# Patient Record
Sex: MALE | Race: WHITE | NOT HISPANIC OR LATINO | Employment: OTHER | URBAN - METROPOLITAN AREA
[De-identification: names, ages, dates, MRNs, and addresses within clinical notes are randomized per-mention and may not be internally consistent; named-entity substitution may affect disease eponyms.]

---

## 2017-01-06 LAB — INR PPP: 3

## 2017-01-10 ENCOUNTER — TELEPHONE (OUTPATIENT)
Dept: CARDIOLOGY | Facility: CLINIC | Age: 72
End: 2017-01-10

## 2017-01-10 NOTE — TELEPHONE ENCOUNTER
----- Message from Umm Aguillon PharmD sent at 1/10/2017  3:56 PM CST -----  Meche,     Mr. Pitts went to [facility] in [place], [place] for PT/INR on 1/6/17. I have called for the results but they will not release the results to me without a clinic number or clinic ID. Do you have information on this number or can you find out his results?    Thanks,  Umm

## 2017-01-11 ENCOUNTER — ANTI-COAG VISIT (OUTPATIENT)
Dept: CARDIOLOGY | Facility: CLINIC | Age: 72
End: 2017-01-11

## 2017-01-11 DIAGNOSIS — Z79.01 LONG TERM (CURRENT) USE OF ANTICOAGULANTS: Primary | ICD-10-CM

## 2017-01-11 RX ORDER — WARFARIN 2.5 MG/1
TABLET ORAL
Qty: 72 TABLET | Refills: 0 | Status: SHIPPED | OUTPATIENT
Start: 2017-01-11 | End: 2017-05-19 | Stop reason: SDUPTHER

## 2017-01-11 NOTE — PROGRESS NOTES
Faxed result taken from __[facility]_______. PT/INR __31.0/3.0_____ Date drawn___1/6_____ Patient's wife reports cold symptoms and otc allergy medications but no other changes.  Continue dose.

## 2017-02-09 ENCOUNTER — OFFICE VISIT (OUTPATIENT)
Dept: CARDIOLOGY | Facility: CLINIC | Age: 72
End: 2017-02-09
Payer: MEDICARE

## 2017-02-09 ENCOUNTER — ANTI-COAG VISIT (OUTPATIENT)
Dept: CARDIOLOGY | Facility: CLINIC | Age: 72
End: 2017-02-09
Payer: MEDICARE

## 2017-02-09 VITALS
BODY MASS INDEX: 28.93 KG/M2 | HEIGHT: 69 IN | SYSTOLIC BLOOD PRESSURE: 120 MMHG | DIASTOLIC BLOOD PRESSURE: 84 MMHG | BODY MASS INDEX: 30.1 KG/M2 | HEIGHT: 69 IN | WEIGHT: 203.25 LBS | SYSTOLIC BLOOD PRESSURE: 130 MMHG | HEART RATE: 68 BPM | DIASTOLIC BLOOD PRESSURE: 72 MMHG | HEART RATE: 59 BPM | WEIGHT: 195.31 LBS

## 2017-02-09 DIAGNOSIS — I25.2 OLD MI (MYOCARDIAL INFARCTION): ICD-10-CM

## 2017-02-09 DIAGNOSIS — I10 ESSENTIAL HYPERTENSION: ICD-10-CM

## 2017-02-09 DIAGNOSIS — Z79.01 LONG TERM (CURRENT) USE OF ANTICOAGULANTS: Primary | ICD-10-CM

## 2017-02-09 DIAGNOSIS — I48.0 PAROXYSMAL ATRIAL FIBRILLATION: ICD-10-CM

## 2017-02-09 DIAGNOSIS — I50.23 ACUTE ON CHRONIC SYSTOLIC CONGESTIVE HEART FAILURE: Primary | ICD-10-CM

## 2017-02-09 DIAGNOSIS — Z95.1 S/P CABG (CORONARY ARTERY BYPASS GRAFT): ICD-10-CM

## 2017-02-09 DIAGNOSIS — I25.10 CORONARY ARTERY DISEASE INVOLVING NATIVE CORONARY ARTERY OF NATIVE HEART WITHOUT ANGINA PECTORIS: Chronic | ICD-10-CM

## 2017-02-09 DIAGNOSIS — I48.3 TYPICAL ATRIAL FLUTTER: Primary | ICD-10-CM

## 2017-02-09 DIAGNOSIS — Z98.61 S/P PTCA (PERCUTANEOUS TRANSLUMINAL CORONARY ANGIOPLASTY): ICD-10-CM

## 2017-02-09 DIAGNOSIS — I48.3 TYPICAL ATRIAL FLUTTER: ICD-10-CM

## 2017-02-09 DIAGNOSIS — E78.5 HYPERLIPIDEMIA, UNSPECIFIED HYPERLIPIDEMIA TYPE: Chronic | ICD-10-CM

## 2017-02-09 LAB — INR PPP: 1.7 (ref 2.5–3)

## 2017-02-09 PROCEDURE — 99999 PR PBB SHADOW E&M-EST. PATIENT-LVL I: CPT | Mod: PBBFAC,,,

## 2017-02-09 PROCEDURE — 99213 OFFICE O/P EST LOW 20 MIN: CPT | Mod: PBBFAC,27 | Performed by: PHYSICIAN ASSISTANT

## 2017-02-09 PROCEDURE — 99214 OFFICE O/P EST MOD 30 MIN: CPT | Mod: S$PBB,,, | Performed by: PHYSICIAN ASSISTANT

## 2017-02-09 PROCEDURE — 99999 PR PBB SHADOW E&M-EST. PATIENT-LVL III: CPT | Mod: PBBFAC,,, | Performed by: PHYSICIAN ASSISTANT

## 2017-02-09 PROCEDURE — 99999 PR PBB SHADOW E&M-EST. PATIENT-LVL II: CPT | Mod: PBBFAC,,, | Performed by: INTERNAL MEDICINE

## 2017-02-09 PROCEDURE — 99214 OFFICE O/P EST MOD 30 MIN: CPT | Mod: S$PBB,,, | Performed by: INTERNAL MEDICINE

## 2017-02-09 RX ORDER — ACETAMINOPHEN 500 MG
185 TABLET ORAL
COMMUNITY

## 2017-02-09 NOTE — MR AVS SNAPSHOT
[facility]  [address] [facility] [address]  Phone: [phone]  Fax: [phone]                  Jethro Pitts   2017 1:30 PM   Office Visit    Description:  Male : 1945   Provider:  IDRIS Nash   Department:  [facility]           Reason for Visit     Congestive Heart Failure           Diagnoses this Visit        Comments    Old MI (myocardial infarction)    -  Primary     S/P CABG (coronary artery bypass graft)         Coronary artery disease involving native coronary artery of native heart without angina pectoris                To Do List           Future Appointments        Provider Department Dept Phone    2017 1:00 PM [facility] [phone]    2017 1:30 PM IDRIS Nash [facility] [phone]    3/2/2017 11:00 AM [facility] [phone]      Goals (5 Years of Data)     None      [facility]     [facility] Care Line -  Assistance  Registered nurses in the [facility] On Call Center provide clinical advisement, health education, appointment booking, and other advisory services.  Call for this free service at [phone].             Medications           Message regarding Medications     Verify the changes and/or additions to your medication regime listed below are the same as discussed with your clinician today.  If any of these changes or additions are incorrect, please notify your healthcare provider.             Verify that the below list of medications is an accurate representation of the medications you are currently taking.  If none reported, the list may be blank. If incorrect, please contact your healthcare provider. Carry this list with you in case of emergency.           Current Medications     atorvastatin (LIPITOR) 20 MG tablet Take 20 mg by mouth once daily.    carvedilol (COREG) 6.25 MG tablet TAKE 1 TABLET IN THE  "MORNING  AND TAKE 2 TABLETS AT NIGHT    escitalopram oxalate (LEXAPRO) 10 MG tablet Take 10 mg by mouth once daily.     fish oil-omega-3 fatty acids 300-1,000 mg capsule Take 1,290 g by mouth once daily.     vitamin D 1000 units Tab Take 185 mg by mouth.    warfarin (COUMADIN) 2.5 MG tablet Take 1/2 tablet Monday, Wednesday and 1 tablet all other days as directed by the coumadin clinic.           Clinical Reference Information           Your Vitals Were     BP Pulse Height Weight BMI    120/72 59 5' 9" (1.753 m) 88.6 kg (195 lb 5.2 oz) 28.84 kg/m2      Blood Pressure          Most Recent Value    BP  120/72      Allergies as of 2/9/2017     Pcn [Penicillins]      Immunizations Administered on Date of Encounter - 2/9/2017     None      Orders Placed During Today's Visit     Future Labs/Procedures Expected by Expires    Comprehensive metabolic panel  8/11/2017 (Approximate) 2/9/2018    Lipid panel  8/11/2017 (Approximate) 2/9/2018      Language Assistance Services     ATTENTION: Language assistance services are available, free of charge. Please call [phone].      ATENCIÓN: Si silvestre schreiber, tiene a duran disposición servicios gratuitos de asistencia lingüística. Llame al [phone].     SAFIA Ý: N?u b?n nói Ti?ng Vi?t, có các d?ch v? h? tr? ngôn ng? mi?n phí dành cho b?n. G?i s? [phone].         [facility] complies with applicable Federal civil rights laws and does not discriminate on the basis of race, color, national origin, age, disability, or sex.        "

## 2017-02-09 NOTE — PROGRESS NOTES
Subjective:    Patient ID:  Jethro Pitts is a 71 y.o. male who presents for follow-up of Atrial Fibrillation      HPI Comments: 71 yoM atrial fibrillation, atrial flutter on Coumadin,  systolic CHF, cardiomyopathy, CAD s/p CABG, old MI, HTN, and hyperlipidemia. He presents to clinic today for hypotension. Home health Nurse took blood pressure and it was 70/50. It was reported he was feeling some generalize weakness, however he states he always fills some generalize weakness. Denies chest pain, increase shortness of breath, palpitations, syncope or near syncope. Vital signs are stable today in clinic. He has had several hospitalizations over the last few months with A Fib with RVR and Systolic Heart Failure. Patient was last admitted to [facility] in 3/2016, at that time he was loaded on Dig and started on Amiodarone. He was last seen in clinic on 3/29/2016 and was doing ok. 2 D Echo on 12/30/2016 showed severely depressed left ventricular systolic function (EF 15-20%). Was schedule an ablation on 2/23/2016, however was cancel because DIONNE showed aq clot on his RADHA and RAA.     7/16: He underwent successful PVI and AFL ablation 5/24/16 without complication. He remains on digoxin, amiodarone 200 mg bid, coreg and warfarin. His pre-procedure DIONNE showed EF 60% in NSR. He feels markedly improved. Less dyspnea, fatigue & edema.     10/16: Amiodarone and digoxin stopped July 2016. He remains on coreg and warfarin. No active complaints. Feels well, lost 40 pounds this year. Remains in SR by symptoms as well as by ECG. Echo shows EF 40-45%, improved from 15% in AF/AFL.     Interval history: No symptomatic recurrence. Remains on warfarin due to history of RADAH thrombus.    Echo 10/16:  CONCLUSIONS     1 - Mild left atrial enlargement.     2 - Eccentric hypertrophy.     3 - Mildly to moderately depressed left ventricular systolic function (EF 40-45%).     4 - Normal left ventricular diastolic function.     5 - Normal right  ventricular systolic function .     6 - Trivial to mild mitral regurgitation.     7 - The estimated PA systolic pressure is greater than 29 mmHg.     8 - Trivial to mild tricuspid regurgitation.     Past Medical History:    Acute coronary syndrome                                       Anticoagulant long-term use                                   Atrial fibrillation                                           Atrial flutter                                                CHF (congestive heart failure)                                Coronary artery disease                                       Depression                                                    Encounter for blood transfusion                               Hyperlipidemia                                                Hypertension                                                  Old MI (myocardial infarction)                                S/P CABG (coronary artery bypass graft)         12/5/2013     S/P PTCA (percutaneous transluminal coronary a* 12/5/2013     Sleep apnea                                                   Past Surgical History:    CORONARY ANGIOPLASTY                                           CORONARY ARTERY BYPASS GRAFT                                   KNEE SURGERY                                                   EYE SURGERY                                                    Social History    Marital status:              Spouse name:                       Years of education:                 Number of children:               Occupational History    None on file    Social History Main Topics    Smoking status: Former Smoker                                                                Packs/day: 0.50      Years: 15.00          Quit date: 12/5/1983       Smokeless status: Not on file                       Alcohol use: No              Drug use: No              Sexual activity: Not on file          Other Topics            Concern    None on  file    Social History Narrative    None on file    Review of patient's family history indicates:    Coronary artery disease        Mother                    Coronary artery disease        Sister                    Coronary artery disease        Brother                     Atrial Fibrillation   Symptoms are negative for chest pain, palpitations and syncope. Past medical history includes atrial fibrillation.       Review of Systems   Constitution: Negative for malaise/fatigue.   HENT: Negative.    Eyes: Negative.    Cardiovascular: Negative for chest pain, dyspnea on exertion, irregular heartbeat, near-syncope, orthopnea, palpitations and syncope.   Respiratory: Negative.    Endocrine: Negative.    Skin: Negative.    Musculoskeletal: Negative.    Gastrointestinal: Negative.    Genitourinary: Negative.    Neurological: Negative.    Psychiatric/Behavioral: Negative.    Allergic/Immunologic: Negative.         Objective:    Physical Exam   Constitutional: He is oriented to person, place, and time. He appears well-developed and well-nourished. No distress.   HENT:   Head: Normocephalic and atraumatic.   Eyes: Conjunctivae and EOM are normal. Pupils are equal, round, and reactive to light. Right eye exhibits no discharge. Left eye exhibits no discharge.   Neck: Neck supple. No JVD present. No tracheal deviation present. No thyromegaly present.   Cardiovascular: Normal rate, regular rhythm, S1 normal, S2 normal, normal heart sounds and intact distal pulses.  PMI is not displaced.  Exam reveals no gallop, no S3, no S4 and no friction rub.    No murmur heard.  Pulses:       Radial pulses are 2+ on the right side, and 2+ on the left side.   Pulmonary/Chest: Effort normal and breath sounds normal. No respiratory distress. He has no wheezes. He has no rales.   Abdominal: Soft. He exhibits no distension. There is no tenderness. There is no rebound.   Musculoskeletal: He exhibits no edema.   Neurological: He is alert and oriented  to person, place, and time. No cranial nerve deficit.   Skin: Skin is warm and dry. He is not diaphoretic. No erythema.   Psychiatric: He has a normal mood and affect. His behavior is normal. Judgment and thought content normal.   Nursing note and vitals reviewed.        Assessment:       1. Typical atrial flutter    2. Paroxysmal atrial fibrillation    3. Essential hypertension    4. S/P CABG (coronary artery bypass graft)         Plan:         71 yoM AF, AFL, arrhythmia induced cardiomyopathy s/p RFA here for long term visit. He has no documented or symptomatic recurrence of AF/AFL or heart failure. He has been off AAD therapy for 6 months. Would continue coreg and warfarin. RTC 1y or as needed.

## 2017-02-09 NOTE — PROGRESS NOTES
Subjective:    Patient ID:  Jethro Pitts is a 71 y.o. male who presents for follow-up of Congestive Heart Failure      HPI  Mr. Pitts is a 71 year old male patient with a PMHx of atrial fibrillation, atrial flutter (previoulsy on Eliquis, now on Coumadin) systolic CHF, cardiomyopathy, CAD s/p CABG, old MI, HTN, and hyperlipidemia who presents today for follow-up. He underwent successful PVI and AFL ablation 5/24/16 by Dr. Villafuerte without any complications. He returns today and states he is feeling great. No complaints. Denies any chest pain, SOB, or other anginal signs or symptoms. CHF wise, patient appears stable. No SOB, PND, orthopnea, abdominal bloating, lower extremity edema or weight gain. Patient also denies any lightheadedness, dizziness, palpitations, near syncope, or syncope. He reports compliance with his medications. Remains on Coumadin for CVA prophylaxis. No bleeding issues. No s/s of TIA/CVA. 2D echo from 10/16 reviewed and showed EF of 40%, improved from 15%. Stress 1/16 negative for ischemia.       Review of Systems   Constitution: Negative for chills, decreased appetite, fever, weakness and malaise/fatigue.   HENT: Negative for congestion, headaches, hoarse voice and sore throat.    Eyes: Negative for blurred vision and discharge.   Cardiovascular: Negative for chest pain, claudication, cyanosis, dyspnea on exertion, irregular heartbeat, leg swelling, near-syncope, orthopnea, palpitations and paroxysmal nocturnal dyspnea.   Respiratory: Negative for cough, hemoptysis, shortness of breath, snoring, sputum production and wheezing.    Endocrine: Negative for cold intolerance and heat intolerance.   Hematologic/Lymphatic: Negative for bleeding problem. Does not bruise/bleed easily.   Skin: Negative for rash.   Musculoskeletal: Negative for arthritis, back pain, joint pain, joint swelling, muscle cramps, muscle weakness and myalgias.   Gastrointestinal: Negative for abdominal pain, constipation,  "diarrhea, heartburn, melena and nausea.   Genitourinary: Negative for hematuria.   Neurological: Negative for dizziness, focal weakness, light-headedness, loss of balance, numbness, paresthesias and seizures.   Psychiatric/Behavioral: Negative for memory loss. The patient does not have insomnia.    Allergic/Immunologic: Negative for hives.         Visit Vitals    /72    Pulse (!) 59    Ht 5' 9" (1.753 m)    Wt 88.6 kg (195 lb 5.2 oz)    BMI 28.84 kg/m2       Objective:    Physical Exam   Constitutional: He is oriented to person, place, and time. He appears well-developed and well-nourished. No distress.   HENT:   Head: Normocephalic and atraumatic.   Eyes: Pupils are equal, round, and reactive to light. Right eye exhibits no discharge. Left eye exhibits no discharge.   Neck: Neck supple. No JVD present. No tracheal deviation present. No thyromegaly present.   Cardiovascular: Regular rhythm, S1 normal, S2 normal and normal heart sounds.  Bradycardia present.  PMI is not displaced.  Exam reveals no gallop, no S3, no S4 and no friction rub.    No murmur heard.  Pulses:       Radial pulses are 2+ on the right side, and 2+ on the left side.   Pulmonary/Chest: Effort normal and breath sounds normal. No respiratory distress. He has no wheezes. He has no rales.   Abdominal: Soft. He exhibits no distension. There is no tenderness. There is no rebound.   Musculoskeletal: He exhibits no edema.   Neurological: He is alert and oriented to person, place, and time.   Skin: Skin is warm and dry. He is not diaphoretic. No erythema.   Psychiatric: He has a normal mood and affect. His behavior is normal. Thought content normal.   Nursing note and vitals reviewed.      2D Echo CONCLUSIONS     1 - Mild left atrial enlargement.     2 - Eccentric hypertrophy.     3 - Mildly to moderately depressed left ventricular systolic function (EF 40-45%).     4 - Normal left ventricular diastolic function.     5 - Normal right ventricular " systolic function .     6 - Trivial to mild mitral regurgitation.     7 - The estimated PA systolic pressure is greater than 29 mmHg.     8 - Trivial to mild tricuspid regurgitation.       Impression: ABNORMAL MYOCARDIAL PERFUSION  1. The perfusion scan is free of evidence for myocardial ischemia.   2. There is a moderate size fixed defect of moderate intensity that extends from the base to the distal inferior wall of the left ventricle, consistent with myocardial injury.   3. There is abnormal wall motion at rest showing akinesis of the inferoseptal wall of the left ventricle.   4. There is resting LV dysfunction with a reduced ejection fraction of 18 %.   5. The left ventricular volume is moderately increased at rest.   6. The extracardiac distribution of radioactivity is normal.       Assessment:       1. Acute on chronic systolic congestive heart failure    2. Old MI (myocardial infarction)    3. S/P CABG (coronary artery bypass graft)    4. Coronary artery disease involving native coronary artery of native heart without angina pectoris    5. Typical atrial flutter    6. Essential hypertension    7. S/P PTCA (percutaneous transluminal coronary angioplasty)    8. Hyperlipidemia, unspecified hyperlipidemia type       Doing clinically well. No complaints. CHF well compensated. Followed regularly by Dr. Villafuerte. Continue current meds.   Plan:   -Continue current medical management and risk factor modification  -Cardiac, low salt diet  -Increase activity level  -Followed regularly by Coumadin clinic  -RTC 6 months with lipid, cmp, EKG        Chart reviewed. Dr. Raza agrees with plan as outlined above.

## 2017-02-09 NOTE — PROGRESS NOTES
"INR today is sub-therapeutic. Patient is unsure of missed doses, reports "wife just gives it to me". No high vitamin k foods, no changes in medications. Will increase tonight's dose only then re-challenge dose until follow-up.  "

## 2017-03-10 ENCOUNTER — ANTI-COAG VISIT (OUTPATIENT)
Dept: CARDIOLOGY | Facility: CLINIC | Age: 72
End: 2017-03-10
Payer: MEDICARE

## 2017-03-10 DIAGNOSIS — Z79.01 LONG TERM (CURRENT) USE OF ANTICOAGULANTS: Primary | ICD-10-CM

## 2017-03-10 LAB — INR PPP: 1.5 (ref 2.5–3)

## 2017-03-10 PROCEDURE — 99999 PR PBB SHADOW E&M-EST. PATIENT-LVL I: CPT | Mod: PBBFAC,,,

## 2017-03-10 PROCEDURE — 85610 PROTHROMBIN TIME: CPT | Mod: PBBFAC

## 2017-03-10 PROCEDURE — 99211 OFF/OP EST MAY X REQ PHY/QHP: CPT | Mod: PBBFAC

## 2017-03-10 NOTE — PROGRESS NOTES
INR is sub-therapeutic. Patient wife denies missed doses but reports he now eats avocados daily. Continue current diet and increase total weekly dose. Repeat INR in 1 week.

## 2017-03-10 NOTE — MR AVS SNAPSHOT
[facility]  [address] [facility]  [place] [address]  Phone: [phone]  Fax: [phone]                  Jethro Pitts   3/10/2017 11:15 AM   Anti-coag visit    Description:  Male : 1945   Provider:  Umm Aguillon PharmD   Department:  [facility]           Diagnoses this Visit        Comments    Long term (current) use of anticoagulants    -  Primary            To Do List           Future Appointments        Provider Department Dept Phone    3/10/2017 11:15 AM [facility] [phone]    3/17/2017 11:00 AM [facility] [phone]      Goals (5 Years of Data)     None      [facility] On Call     [facility] On Call Nurse Care Line -  Assistance  Registered nurses in the [facility] On Call Center provide clinical advisement, health education, appointment booking, and other advisory services.  Call for this free service at [phone].             Medications           Message regarding Medications     Verify the changes and/or additions to your medication regime listed below are the same as discussed with your clinician today.  If any of these changes or additions are incorrect, please notify your healthcare provider.             Verify that the below list of medications is an accurate representation of the medications you are currently taking.  If none reported, the list may be blank. If incorrect, please contact your healthcare provider. Carry this list with you in case of emergency.           Current Medications     atorvastatin (LIPITOR) 20 MG tablet Take 20 mg by mouth once daily.    carvedilol (COREG) 6.25 MG tablet TAKE 1 TABLET IN THE MORNING  AND TAKE 2 TABLETS AT NIGHT    escitalopram oxalate (LEXAPRO) 10 MG tablet Take 10 mg by mouth once daily.     fish oil-omega-3 fatty acids 300-1,000 mg capsule Take 1,290 g by mouth once daily.     vitamin D 1000 units Tab Take 185 mg by mouth.    warfarin  (COUMADIN) 2.5 MG tablet Take 1/2 tablet Monday, Wednesday and 1 tablet all other days as directed by the coumadin clinic.           Clinical Reference Information           Allergies as of 3/10/2017     Pcn [Penicillins]      Immunizations Administered on Date of Encounter - 3/10/2017     None      Orders Placed During Today's Visit      Normal Orders This Visit    POCT INR          3/10/2017 10:43 AM - Juanis NicoleD      Component Results     Component Value Flag Ref Range Units Status    INR 1.5 (A) 2.5 - 3.0  Final      February 2017 Details    Sun Mon Tue Wed Thu Fri Sat        1               2               3               4                 5               6               7               8      1.25 mg         9   1.7   5 mg   See details      10      2.5 mg         11      2.5 mg           12      2.5 mg         13      1.25 mg         14      2.5 mg         15      1.25 mg         16      2.5 mg         17      2.5 mg         18      2.5 mg           19      2.5 mg         20      1.25 mg         21      2.5 mg         22      1.25 mg         23      2.5 mg         24      2.5 mg         25      2.5 mg           26      2.5 mg         27      1.25 mg         28      2.5 mg              Date Details   02/09 Last INR check   INR: 1.7                 March 2017 Details    Sun Mon Tue Wed Thu Fri Sat        1      1.25 mg         2      2.5 mg         3      2.5 mg         4      2.5 mg           5      2.5 mg         6      1.25 mg         7      2.5 mg         8      1.25 mg         9      2.5 mg         10   1.5   2.5 mg   See details      11      2.5 mg           12      2.5 mg         13      1.25 mg         14      2.5 mg         15      2.5 mg         16      2.5 mg         17            18                 19               20               21               22               23               24               25                 26               27               28               29                30 31                 Date Details   03/10 This INR check   INR: 1.5       Date of next INR:  3/17/2017               How to take your warfarin dose     To take:  1.25 mg Take 0.5 of a 2.5 mg tablet.    To take:  2.5 mg Take 1 of the 2.5 mg tablets.           Anticoagulation Summary as of 3/10/2017     Maintenance plan 1.25 mg (2.5 mg x 0.5) on Mon; 2.5 mg (2.5 mg x 1) all other days    Full instructions 1.25 mg on Mon; 2.5 mg all other days    Next INR check 3/17/2017      Anticoagulation Episode Summary     Comments [facility]: [phone]      Patient Findings     Positives Change in diet/appetite    Negatives Signs/symptoms of thrombosis, Signs/symptoms of bleeding, Laboratory test error suspected, Change in health, Change in alcohol use, Change in activity, Upcoming invasive procedure, Emergency department visit, Upcoming dental procedure, Missed doses, Extra doses, Change in medications, Hospital admission, Bruising, Other complaints      Language Assistance Services     ATTENTION: Language assistance services are available, free of charge. Please call [phone].      ATENCIÓN: Si silvestre schreiber, tiene a duran disposición servicios gratuitos de asistencia lingüística. Llame al [phone].     CHÚ Ý: N?u b?n nói Ti?ng Vi?t, có các d?ch v? h? tr? ngôn ng? mi?n phí dành cho b?n. G?i s? [phone].         [facility] complies with applicable Federal civil rights laws and does not discriminate on the basis of race, color, national origin, age, disability, or sex.

## 2017-03-17 ENCOUNTER — ANTI-COAG VISIT (OUTPATIENT)
Dept: CARDIOLOGY | Facility: CLINIC | Age: 72
End: 2017-03-17
Payer: MEDICARE

## 2017-03-17 DIAGNOSIS — Z79.01 LONG TERM (CURRENT) USE OF ANTICOAGULANTS: Primary | ICD-10-CM

## 2017-03-17 LAB — INR PPP: 1.8 (ref 2.5–3)

## 2017-03-17 PROCEDURE — 99211 OFF/OP EST MAY X REQ PHY/QHP: CPT | Mod: PBBFAC

## 2017-03-17 PROCEDURE — 85610 PROTHROMBIN TIME: CPT | Mod: PBBFAC

## 2017-03-17 PROCEDURE — 99999 PR PBB SHADOW E&M-EST. PATIENT-LVL I: CPT | Mod: PBBFAC,,,

## 2017-03-17 NOTE — PROGRESS NOTES
INR remains sub-therapeutic. Patient confirms dose and compliance. Will increase total weekly dose until follow-up. Repeat INR in 2 weeks.

## 2017-03-31 LAB — INR PPP: 2.3

## 2017-04-04 ENCOUNTER — ANTI-COAG VISIT (OUTPATIENT)
Dept: CARDIOLOGY | Facility: CLINIC | Age: 72
End: 2017-04-04

## 2017-04-04 DIAGNOSIS — Z79.01 LONG TERM (CURRENT) USE OF ANTICOAGULANTS: Primary | ICD-10-CM

## 2017-04-04 NOTE — PROGRESS NOTES
Faxed result taken from __[facility]_______. PT/INR _23.5/2.3_____ Date drawn__3/31____ Begin 1.5 tablets on Tuesdays and 1 tablet all other days. Repeat INR in 3 weeks. Patient's wife verbalized understanding.

## 2017-05-16 ENCOUNTER — ANTI-COAG VISIT (OUTPATIENT)
Dept: CARDIOLOGY | Facility: CLINIC | Age: 72
End: 2017-05-16
Payer: MEDICARE

## 2017-05-16 DIAGNOSIS — Z79.01 LONG TERM (CURRENT) USE OF ANTICOAGULANTS: Primary | ICD-10-CM

## 2017-05-16 LAB — INR PPP: 2.3 (ref 2.5–3)

## 2017-05-16 PROCEDURE — 85610 PROTHROMBIN TIME: CPT | Mod: PBBFAC

## 2017-05-16 PROCEDURE — 99999 PR PBB SHADOW E&M-EST. PATIENT-LVL I: CPT | Mod: PBBFAC,,,

## 2017-05-16 PROCEDURE — 99211 OFF/OP EST MAY X REQ PHY/QHP: CPT | Mod: PBBFAC

## 2017-05-16 NOTE — MR AVS SNAPSHOT
[facility]  [address] [facility]  [place] [address]  Phone: [phone]  Fax: [phone]                  Jethro Pitts   2017 9:00 AM   Anti-coag visit    Description:  Male : 1945   Provider:  Umm Aguillon PharmD   Department:  [facility]           Diagnoses this Visit        Comments    Long term (current) use of anticoagulants    -  Primary            To Do List           Future Appointments        Provider Department Dept Phone    2017 9:15 AM Umm Aguillon, [facility] [phone]      Goals (5 Years of Data)     None      [facility]     [facility] Nurse Care Line -  Assistance  Unless otherwise directed by your provider, please contact [facility], our nurse care line that is available for  assistance.     Registered nurses in the [facility] Center provide: appointment scheduling, clinical advisement, health education, and other advisory services.  Call: [phone] (toll free)               Medications           Message regarding Medications     Verify the changes and/or additions to your medication regime listed below are the same as discussed with your clinician today.  If any of these changes or additions are incorrect, please notify your healthcare provider.             Verify that the below list of medications is an accurate representation of the medications you are currently taking.  If none reported, the list may be blank. If incorrect, please contact your healthcare provider. Carry this list with you in case of emergency.           Current Medications     atorvastatin (LIPITOR) 20 MG tablet Take 20 mg by mouth once daily.    carvedilol (COREG) 6.25 MG tablet TAKE 1 TABLET IN THE MORNING  AND TAKE 2 TABLETS AT NIGHT    escitalopram oxalate (LEXAPRO) 10 MG tablet Take 10 mg by mouth once daily.     fish oil-omega-3 fatty acids 300-1,000 mg capsule Take 1,290 g by mouth once daily.     vitamin D  1000 units Tab Take 185 mg by mouth.    warfarin (COUMADIN) 2.5 MG tablet Take 1/2 tablet Monday, Wednesday and 1 tablet all other days as directed by the coumadin clinic.           Clinical Reference Information           Allergies as of 5/16/2017     Pcn [Penicillins]      Immunizations Administered on Date of Encounter - 5/16/2017     None      Orders Placed During Today's Visit      Normal Orders This Visit    POCT INR          5/16/2017  9:00 AM - Juanis NicoleD      Component Results     Component Value Flag Ref Range Units Status    INR 2.3 (A) 2.5 - 3.0  Final      April 2017 Details    Sun Mon Tue Wed Thu Fri Sat           1                 2               3               4      3.75 mg   See details      5      2.5 mg         6      2.5 mg         7      2.5 mg         8      2.5 mg           9      2.5 mg         10      2.5 mg         11      3.75 mg         12      2.5 mg         13      2.5 mg         14      2.5 mg         15      2.5 mg           16      2.5 mg         17      2.5 mg         18      3.75 mg         19      2.5 mg         20      2.5 mg         21      2.5 mg         22      2.5 mg           23      2.5 mg         24      2.5 mg         25      3.75 mg         26      2.5 mg         27      2.5 mg         28      2.5 mg         29      2.5 mg           30      2.5 mg                Date Details   04/04 Last INR check                 May 2017 Details    Sun Mon Tue Wed Thu Fri Sat      1      2.5 mg         2      3.75 mg         3      2.5 mg         4      2.5 mg         5      2.5 mg         6      2.5 mg           7      2.5 mg         8      2.5 mg         9      3.75 mg         10      2.5 mg         11      2.5 mg         12      2.5 mg         13      2.5 mg           14      2.5 mg         15      2.5 mg         16   2.3   3.75 mg   See details      17      2.5 mg         18      3.75 mg         19      2.5 mg         20      2.5 mg           21      2.5 mg          22      2.5 mg         23      3.75 mg         24      2.5 mg         25      3.75 mg         26      2.5 mg         27      2.5 mg           28      2.5 mg         29      2.5 mg         30            31                   Date Details   05/16 This INR check   INR: 2.3       Date of next INR:  5/30/2017               How to take your warfarin dose     To take:  2.5 mg Take 1 of the 2.5 mg tablets.    To take:  3.75 mg Take 1.5 of the 2.5 mg tablets.           Anticoagulation Summary as of 5/16/2017     Maintenance plan 3.75 mg (2.5 mg x 1.5) on Tue, Thu; 2.5 mg (2.5 mg x 1) all other days    Full instructions 3.75 mg on Tue, Thu; 2.5 mg all other days    Next INR check 5/30/2017      Anticoagulation Episode Summary     Comments [facility]: [phone]      Patient Findings     Negatives Signs/symptoms of thrombosis, Signs/symptoms of bleeding, Laboratory test error suspected, Change in health, Change in alcohol use, Change in activity, Upcoming invasive procedure, Emergency department visit, Upcoming dental procedure, Missed doses, Extra doses, Change in medications, Change in diet/appetite, Hospital admission, Bruising, Other complaints      Language Assistance Services     ATTENTION: Language assistance services are available, free of charge. Please call [phone].      ATENCIÓN: Si cassla candie, tiene a duran disposición servicios gratuitos de asistencia lingüística. Llame al [phone].     [facility] Ý: N?u b?n nói Ti?ng Vi?t, có các d?ch v? h? tr? ngôn ng? mi?n phí dành cho b?n. G?i s? [phone].         [facility] complies with applicable Federal civil rights laws and does not discriminate on the basis of race, color, national origin, age, disability, or sex.

## 2017-05-16 NOTE — PROGRESS NOTES
INR remains slightly sub-therapeutic. Patient reports no bleeding or bruising, no new medications and no diet changes. Will gently increase total weekly dose until follow-up.

## 2017-05-19 RX ORDER — WARFARIN 2.5 MG/1
TABLET ORAL
Qty: 96 TABLET | Refills: 0 | Status: SHIPPED | OUTPATIENT
Start: 2017-05-19 | End: 2017-07-27 | Stop reason: SDUPTHER

## 2017-06-01 ENCOUNTER — ANTI-COAG VISIT (OUTPATIENT)
Dept: CARDIOLOGY | Facility: CLINIC | Age: 72
End: 2017-06-01
Payer: MEDICARE

## 2017-06-01 DIAGNOSIS — Z79.01 LONG TERM (CURRENT) USE OF ANTICOAGULANTS: Primary | ICD-10-CM

## 2017-06-01 LAB — INR PPP: 2.9 (ref 2.5–3)

## 2017-06-01 PROCEDURE — 85610 PROTHROMBIN TIME: CPT | Mod: PBBFAC

## 2017-06-01 NOTE — PROGRESS NOTES
INR is now therapeutic. Continue current dose and diet. Patient is going on vacation and will return at the end of July. Instructed to go to [facility] in [place] for repeat INR 6/29.

## 2017-06-28 ENCOUNTER — ANTI-COAG VISIT (OUTPATIENT)
Dept: CARDIOLOGY | Facility: CLINIC | Age: 72
End: 2017-06-28

## 2017-06-29 LAB — INR PPP: 3

## 2017-07-10 ENCOUNTER — ANTI-COAG VISIT (OUTPATIENT)
Dept: CARDIOLOGY | Facility: CLINIC | Age: 72
End: 2017-07-10

## 2017-07-10 NOTE — PROGRESS NOTES
Verbal result taken from __[facility]_______. PT/INR __31.3/3.0_____ Date drawn__6/29_____   Continue current dose, repeat INR in 4 weeks. Wife verbalized understanding.    Fax to follow.

## 2017-07-27 ENCOUNTER — ANTI-COAG VISIT (OUTPATIENT)
Dept: CARDIOLOGY | Facility: CLINIC | Age: 72
End: 2017-07-27
Payer: MEDICARE

## 2017-07-27 DIAGNOSIS — Z79.01 LONG TERM (CURRENT) USE OF ANTICOAGULANTS: Primary | ICD-10-CM

## 2017-07-27 LAB — INR PPP: 3 (ref 2.5–3)

## 2017-07-27 PROCEDURE — 85610 PROTHROMBIN TIME: CPT | Mod: PBBFAC

## 2017-07-27 RX ORDER — WARFARIN 2.5 MG/1
TABLET ORAL
Qty: 96 TABLET | Refills: 0 | Status: SHIPPED | OUTPATIENT
Start: 2017-07-27 | End: 2017-10-12 | Stop reason: SDUPTHER

## 2017-08-31 ENCOUNTER — ANTI-COAG VISIT (OUTPATIENT)
Dept: CARDIOLOGY | Facility: CLINIC | Age: 72
End: 2017-08-31
Payer: MEDICARE

## 2017-08-31 DIAGNOSIS — Z79.01 LONG TERM (CURRENT) USE OF ANTICOAGULANTS: Primary | ICD-10-CM

## 2017-08-31 LAB — INR PPP: 2 (ref 2.5–3)

## 2017-08-31 PROCEDURE — 99211 OFF/OP EST MAY X REQ PHY/QHP: CPT | Mod: PBBFAC

## 2017-08-31 PROCEDURE — 99999 PR PBB SHADOW E&M-EST. PATIENT-LVL I: CPT | Mod: PBBFAC,,,

## 2017-08-31 PROCEDURE — 85610 PROTHROMBIN TIME: CPT | Mod: PBBFAC

## 2017-09-11 ENCOUNTER — OFFICE VISIT (OUTPATIENT)
Dept: CARDIOLOGY | Facility: CLINIC | Age: 72
End: 2017-09-11
Payer: MEDICARE

## 2017-09-11 ENCOUNTER — CLINICAL SUPPORT (OUTPATIENT)
Dept: CARDIOLOGY | Facility: CLINIC | Age: 72
End: 2017-09-11
Payer: MEDICARE

## 2017-09-11 VITALS
DIASTOLIC BLOOD PRESSURE: 78 MMHG | HEART RATE: 70 BPM | SYSTOLIC BLOOD PRESSURE: 140 MMHG | WEIGHT: 211 LBS | BODY MASS INDEX: 31.25 KG/M2 | HEIGHT: 69 IN

## 2017-09-11 DIAGNOSIS — I25.10 CORONARY ARTERY DISEASE INVOLVING NATIVE CORONARY ARTERY OF NATIVE HEART WITHOUT ANGINA PECTORIS: Primary | Chronic | ICD-10-CM

## 2017-09-11 DIAGNOSIS — G47.30 SLEEP APNEA, UNSPECIFIED TYPE: Chronic | ICD-10-CM

## 2017-09-11 DIAGNOSIS — I25.2 OLD MI (MYOCARDIAL INFARCTION): ICD-10-CM

## 2017-09-11 DIAGNOSIS — Z95.1 S/P CABG (CORONARY ARTERY BYPASS GRAFT): ICD-10-CM

## 2017-09-11 DIAGNOSIS — I50.9 CHF (NYHA CLASS III, ACC/AHA STAGE C): Chronic | ICD-10-CM

## 2017-09-11 DIAGNOSIS — I50.23 ACUTE ON CHRONIC SYSTOLIC CONGESTIVE HEART FAILURE: ICD-10-CM

## 2017-09-11 DIAGNOSIS — E78.5 HYPERLIPIDEMIA, UNSPECIFIED HYPERLIPIDEMIA TYPE: Chronic | ICD-10-CM

## 2017-09-11 DIAGNOSIS — Z98.61 S/P PTCA (PERCUTANEOUS TRANSLUMINAL CORONARY ANGIOPLASTY): ICD-10-CM

## 2017-09-11 DIAGNOSIS — I10 ESSENTIAL HYPERTENSION: ICD-10-CM

## 2017-09-11 PROCEDURE — 3078F DIAST BP <80 MM HG: CPT | Mod: ,,, | Performed by: PHYSICIAN ASSISTANT

## 2017-09-11 PROCEDURE — 3077F SYST BP >= 140 MM HG: CPT | Mod: ,,, | Performed by: PHYSICIAN ASSISTANT

## 2017-09-11 PROCEDURE — 93005 ELECTROCARDIOGRAM TRACING: CPT | Mod: PBBFAC | Performed by: INTERNAL MEDICINE

## 2017-09-11 PROCEDURE — 99999 PR PBB SHADOW E&M-EST. PATIENT-LVL III: CPT | Mod: PBBFAC,,, | Performed by: PHYSICIAN ASSISTANT

## 2017-09-11 PROCEDURE — 93010 ELECTROCARDIOGRAM REPORT: CPT | Mod: S$PBB,,, | Performed by: INTERNAL MEDICINE

## 2017-09-11 PROCEDURE — 1159F MED LIST DOCD IN RCRD: CPT | Mod: ,,, | Performed by: PHYSICIAN ASSISTANT

## 2017-09-11 PROCEDURE — 99214 OFFICE O/P EST MOD 30 MIN: CPT | Mod: S$PBB,,, | Performed by: PHYSICIAN ASSISTANT

## 2017-09-11 PROCEDURE — 99213 OFFICE O/P EST LOW 20 MIN: CPT | Mod: PBBFAC,25 | Performed by: PHYSICIAN ASSISTANT

## 2017-09-11 PROCEDURE — 1126F AMNT PAIN NOTED NONE PRSNT: CPT | Mod: ,,, | Performed by: PHYSICIAN ASSISTANT

## 2017-09-11 NOTE — PROGRESS NOTES
Subjective:    Patient ID:  Jethro Pitts is a 72 y.o. male who presents for follow-up of CHF    HPI   Mr. Pitts is a 71 year old male patient with a PMHx of atrial fibrillation, atrial flutter (previoulsy on Eliquis, now on Coumadin s/p successful PVI and AFL ablation 5/24/16 by Dr. Villafuerte) systolic CHF, cardiomyopathy, CAD s/p CABG, old MI, HTN, and hyperlipidemia who presents today for follow-up. He returns today and states he is doing well. No cardiac complaints. Denies any chest pain, SOB, or other anginal signs or symptoms. No palpitations, lightheadedness, dizziness, near syncope, or syncope. No signs/suggestive of CHF. BP stable on current meds. Patient reports compliance with his medications. Remains on Coumadin. No bleeding issues. No signs/symptoms of TIA/CVA. 2D echo from 10/16 reviewed and showed EF of 40%, improved from 15%. Stress 1/16 negative for ischemia. Labs from PCP reviewed. LDL at goal.  EKG today shows SR, no acute changes from previous.     Review of Systems   Constitution: Negative for chills, decreased appetite, fever, weakness and malaise/fatigue.   HENT: Negative for congestion, hoarse voice and sore throat.    Eyes: Negative for blurred vision and discharge.   Cardiovascular: Negative for chest pain, claudication, cyanosis, dyspnea on exertion, irregular heartbeat, leg swelling, near-syncope, orthopnea, palpitations and paroxysmal nocturnal dyspnea.   Respiratory: Negative for cough, hemoptysis, shortness of breath, snoring, sputum production and wheezing.    Endocrine: Negative for cold intolerance and heat intolerance.   Hematologic/Lymphatic: Negative for bleeding problem. Does not bruise/bleed easily.   Skin: Negative for rash.   Musculoskeletal: Negative for arthritis, back pain, joint pain, joint swelling, muscle cramps, muscle weakness and myalgias.   Gastrointestinal: Negative for abdominal pain, constipation, diarrhea, heartburn, melena and nausea.   Genitourinary: Negative  "for hematuria.   Neurological: Negative for dizziness, focal weakness, headaches, light-headedness, loss of balance, numbness, paresthesias and seizures.   Psychiatric/Behavioral: Negative for memory loss. The patient does not have insomnia.    Allergic/Immunologic: Negative for hives.       BP (!) 140/78 (BP Location: Left arm, Patient Position: Sitting, BP Method: Large (Manual))   Pulse 70 Comment: susan  Ht 5' 9" (1.753 m)   Wt 95.7 kg (210 lb 15.7 oz)   BMI 31.16 kg/m²     Objective:    Physical Exam   Constitutional: He is oriented to person, place, and time. He appears well-developed and well-nourished. No distress.   HENT:   Head: Normocephalic and atraumatic.   Eyes: Pupils are equal, round, and reactive to light. Right eye exhibits no discharge. Left eye exhibits no discharge.   Neck: Neck supple. No JVD present. No tracheal deviation present. No thyromegaly present.   Cardiovascular: Normal rate, regular rhythm, S1 normal, S2 normal and normal heart sounds.  PMI is not displaced.  Exam reveals no gallop, no S3, no S4 and no friction rub.    No murmur heard.  Pulses:       Radial pulses are 2+ on the right side, and 2+ on the left side.   Pulmonary/Chest: Effort normal and breath sounds normal. No respiratory distress. He has no wheezes. He has no rales.   Abdominal: Soft. He exhibits no distension. There is no tenderness. There is no rebound.   Musculoskeletal: He exhibits no edema.   Neurological: He is alert and oriented to person, place, and time.   Skin: Skin is warm and dry. He is not diaphoretic. No erythema.   Psychiatric: He has a normal mood and affect. His behavior is normal. Thought content normal.   Nursing note and vitals reviewed.      2D Echo CONCLUSIONS     1 - Mild left atrial enlargement.     2 - Eccentric hypertrophy.     3 - Mildly to moderately depressed left ventricular systolic function (EF 40-45%).     4 - Normal left ventricular diastolic function.     5 - Normal right " ventricular systolic function .     6 - Trivial to mild mitral regurgitation.     7 - The estimated PA systolic pressure is greater than 29 mmHg.     8 - Trivial to mild tricuspid regurgitation.       Lab Results   Component Value Date    CHOL 151 04/24/2012    CHOL 151 07/25/2011    CHOL 152 06/09/2010     Lab Results   Component Value Date    HDL 58 04/24/2012    HDL 56 07/25/2011    HDL 48 06/09/2010     Lab Results   Component Value Date    LDLCALC 73.0 04/24/2012    LDLCALC 74.0 07/25/2011    LDLCALC 67.2 06/09/2010     Lab Results   Component Value Date    TRIG 102 04/24/2012    TRIG 105 07/25/2011    TRIG 184 (H) 06/09/2010     Lab Results   Component Value Date    CHOLHDL 38.4 04/24/2012    CHOLHDL 37.1 07/25/2011    CHOLHDL 31.6 06/09/2010       Chemistry        Component Value Date/Time     10/24/2016 0952    K 4.6 10/24/2016 0952     10/24/2016 0952    CO2 23 10/24/2016 0952    BUN 12 10/24/2016 0952    CREATININE 0.9 10/24/2016 0952    GLU 84 10/24/2016 0952        Component Value Date/Time    CALCIUM 9.1 10/24/2016 0952    ALKPHOS 57 10/24/2016 0952    AST 35 10/24/2016 0952    ALT 31 10/24/2016 0952    BILITOT 1.1 (H) 10/24/2016 0952    ESTGFRAFRICA >60.0 10/24/2016 0952    EGFRNONAA >60.0 10/24/2016 0952        Last chem-creatinine 0.84, AST, ALT normal; total cholesterol-147; triglycerides-98; HDL-63; LDL-64  Assessment:       1. Coronary artery disease involving native coronary artery of native heart without angina pectoris    2. Sleep apnea, unspecified type    3. Acute on chronic systolic congestive heart failure    4. Old MI (myocardial infarction)    5. S/P CABG (coronary artery bypass graft)    6. S/P PTCA (percutaneous transluminal coronary angioplasty)    7. Hyperlipidemia, unspecified hyperlipidemia type    8. Essential hypertension    9. CHF (NYHA class III, ACC/AHA stage C)       Doing clinically well. No CV complaints. No angina or equivalent. No signs/symptoms of CHF. INR  monitored by Coumadin clinic. Continue current mgmt.   Plan:     -Continue current medical management and risk factor modification  -Cardiac, low salt diet  -Increase activity level  -INR managed by Coumadin clinic  -RTC 6 months with 2D echo  -Keep scheduled f/u with Dr. Villafuerte        Chart reviewed. Dr. Raza agrees with plan as outlined above.

## 2017-09-21 ENCOUNTER — ANTI-COAG VISIT (OUTPATIENT)
Dept: CARDIOLOGY | Facility: CLINIC | Age: 72
End: 2017-09-21
Payer: MEDICARE

## 2017-09-21 DIAGNOSIS — Z79.01 LONG TERM (CURRENT) USE OF ANTICOAGULANTS: Primary | ICD-10-CM

## 2017-09-21 LAB — INR PPP: 3.7 (ref 2.5–3)

## 2017-09-21 PROCEDURE — 99999 PR PBB SHADOW E&M-EST. PATIENT-LVL I: CPT | Mod: PBBFAC,,,

## 2017-09-21 PROCEDURE — 99211 OFF/OP EST MAY X REQ PHY/QHP: CPT | Mod: PBBFAC

## 2017-09-21 PROCEDURE — 85610 PROTHROMBIN TIME: CPT | Mod: PBBFAC

## 2017-09-21 NOTE — PROGRESS NOTES
INR is now supra-therapeutic. No bleeding issues. No changes in medications. Will hold x1 dose then gently lower total weekly dose until follow-up. Repeat INR in 3 weeks.

## 2017-10-12 ENCOUNTER — ANTI-COAG VISIT (OUTPATIENT)
Dept: CARDIOLOGY | Facility: CLINIC | Age: 72
End: 2017-10-12
Payer: MEDICARE

## 2017-10-12 DIAGNOSIS — Z79.01 LONG-TERM (CURRENT) USE OF ANTICOAGULANTS: Primary | ICD-10-CM

## 2017-10-12 LAB — INR PPP: 3.4 (ref 2.5–3)

## 2017-10-12 PROCEDURE — 99211 OFF/OP EST MAY X REQ PHY/QHP: CPT | Mod: PBBFAC

## 2017-10-12 PROCEDURE — 85610 PROTHROMBIN TIME: CPT | Mod: PBBFAC

## 2017-10-12 PROCEDURE — 99999 PR PBB SHADOW E&M-EST. PATIENT-LVL I: CPT | Mod: PBBFAC,,,

## 2017-10-12 RX ORDER — WARFARIN 2.5 MG/1
TABLET ORAL
Qty: 90 TABLET | Refills: 0 | Status: SHIPPED | OUTPATIENT
Start: 2017-10-12 | End: 2018-02-06 | Stop reason: SDUPTHER

## 2017-10-12 NOTE — PROGRESS NOTES
INR remains supra-therapeutic. No bleeding issues. Will hold x1 dose then begin 2.5mg daily until follow-up. Patient reports no bleeding or bruising, no new medications and no diet changes.  I reminded the patient to call with any problems, changes or questions before the next visit.

## 2017-10-24 ENCOUNTER — ANTI-COAG VISIT (OUTPATIENT)
Dept: CARDIOLOGY | Facility: CLINIC | Age: 72
End: 2017-10-24
Payer: MEDICARE

## 2017-10-24 DIAGNOSIS — Z79.01 LONG-TERM (CURRENT) USE OF ANTICOAGULANTS: Primary | ICD-10-CM

## 2017-10-24 LAB — INR PPP: 2.1 (ref 2.5–3)

## 2017-10-24 PROCEDURE — 99211 OFF/OP EST MAY X REQ PHY/QHP: CPT | Mod: PBBFAC

## 2017-10-24 PROCEDURE — 85610 PROTHROMBIN TIME: CPT | Mod: PBBFAC

## 2017-10-24 PROCEDURE — 99999 PR PBB SHADOW E&M-EST. PATIENT-LVL I: CPT | Mod: PBBFAC,,,

## 2017-10-24 NOTE — PROGRESS NOTES
INR is now sub-therapeutic. Patient confirms dose and compliance. No changes in diet. Will re-challenge previous dose until follow-up. Repeat INR in 3 weeks.

## 2017-12-14 LAB — INR PPP: 2.6

## 2017-12-15 ENCOUNTER — ANTI-COAG VISIT (OUTPATIENT)
Dept: CARDIOLOGY | Facility: CLINIC | Age: 72
End: 2017-12-15

## 2017-12-15 NOTE — PROGRESS NOTES
Verbal result taken from __patient wife reports INR drawn at [facility] and resulted online per patient_______. INR __2.6___ Date drawn___12/14_____   Continue dose. Repeat INR in 4 weeks.  Hard copy to follow

## 2018-01-11 ENCOUNTER — ANTI-COAG VISIT (OUTPATIENT)
Dept: CARDIOLOGY | Facility: CLINIC | Age: 73
End: 2018-01-11
Payer: MEDICARE

## 2018-01-11 DIAGNOSIS — Z79.01 LONG TERM (CURRENT) USE OF ANTICOAGULANTS: Primary | ICD-10-CM

## 2018-01-11 LAB — INR PPP: 3.4 (ref 2.5–3)

## 2018-01-11 PROCEDURE — 99999 PR PBB SHADOW E&M-EST. PATIENT-LVL I: CPT | Mod: PBBFAC,,,

## 2018-01-11 PROCEDURE — 99211 OFF/OP EST MAY X REQ PHY/QHP: CPT | Mod: PBBFAC

## 2018-01-11 PROCEDURE — 85610 PROTHROMBIN TIME: CPT | Mod: PBBFAC

## 2018-01-11 NOTE — PROGRESS NOTES
INR is supra-therapeutic. No bleeding issues noted. Recovering from cold symptoms. No changes in medications. Will hold x1 dose then re-challenge dose until follow-up.

## 2018-02-06 ENCOUNTER — ANTI-COAG VISIT (OUTPATIENT)
Dept: CARDIOLOGY | Facility: CLINIC | Age: 73
End: 2018-02-06
Payer: MEDICARE

## 2018-02-06 DIAGNOSIS — Z79.01 LONG TERM (CURRENT) USE OF ANTICOAGULANTS: Primary | ICD-10-CM

## 2018-02-06 LAB — INR PPP: 3.3 (ref 2.5–3)

## 2018-02-06 PROCEDURE — 99999 PR PBB SHADOW E&M-EST. PATIENT-LVL I: CPT | Mod: PBBFAC,,,

## 2018-02-06 PROCEDURE — 85610 PROTHROMBIN TIME: CPT | Mod: PBBFAC

## 2018-02-06 PROCEDURE — 99211 OFF/OP EST MAY X REQ PHY/QHP: CPT | Mod: PBBFAC

## 2018-02-06 RX ORDER — WARFARIN 2.5 MG/1
TABLET ORAL
Qty: 90 TABLET | Refills: 0 | Status: SHIPPED | OUTPATIENT
Start: 2018-02-06 | End: 2018-06-04 | Stop reason: SDUPTHER

## 2018-02-06 NOTE — PROGRESS NOTES
INR remains supra-therapeutic. No bleeding issues noted. Will begin 2.5mg daily until follow-up. Patient reports no bleeding or bruising, no new medications and no diet changes.  I reminded the patient to call with any problems, changes or questions before the next visit.

## 2018-02-12 ENCOUNTER — TELEPHONE (OUTPATIENT)
Dept: CARDIOLOGY | Facility: CLINIC | Age: 73
End: 2018-02-12

## 2018-02-12 NOTE — TELEPHONE ENCOUNTER
Patient called inquiring about a machine called [employer] to check Coumadin. Ms. Morrell, do you have any information about this?

## 2018-02-12 NOTE — TELEPHONE ENCOUNTER
Returned phone call to MrGiovany and Mrs. Pitts discussed enrollment with [employer]  For Home monitor and possibility of Dr. Villafuerte stopping Coumadin since almost two years past PVI/RFA May 2016 with follow up on 2/19/2018.  So will wait until after visit to discuss further

## 2018-02-16 DIAGNOSIS — I48.91 ATRIAL FIBRILLATION, UNSPECIFIED TYPE: Primary | ICD-10-CM

## 2018-02-19 ENCOUNTER — CLINICAL SUPPORT (OUTPATIENT)
Dept: CARDIOLOGY | Facility: CLINIC | Age: 73
End: 2018-02-19
Attending: PHYSICIAN ASSISTANT
Payer: MEDICARE

## 2018-02-19 ENCOUNTER — CLINICAL SUPPORT (OUTPATIENT)
Dept: CARDIOLOGY | Facility: CLINIC | Age: 73
End: 2018-02-19
Payer: MEDICARE

## 2018-02-19 ENCOUNTER — OFFICE VISIT (OUTPATIENT)
Dept: CARDIOLOGY | Facility: CLINIC | Age: 73
End: 2018-02-19
Payer: MEDICARE

## 2018-02-19 VITALS
HEART RATE: 78 BPM | WEIGHT: 214.75 LBS | HEIGHT: 69 IN | SYSTOLIC BLOOD PRESSURE: 140 MMHG | BODY MASS INDEX: 31.81 KG/M2 | DIASTOLIC BLOOD PRESSURE: 88 MMHG

## 2018-02-19 DIAGNOSIS — I48.3 TYPICAL ATRIAL FLUTTER: ICD-10-CM

## 2018-02-19 DIAGNOSIS — Z95.1 S/P CABG (CORONARY ARTERY BYPASS GRAFT): ICD-10-CM

## 2018-02-19 DIAGNOSIS — I50.9 CHF (NYHA CLASS III, ACC/AHA STAGE C): Chronic | ICD-10-CM

## 2018-02-19 DIAGNOSIS — I48.19 PERSISTENT ATRIAL FIBRILLATION: Primary | ICD-10-CM

## 2018-02-19 DIAGNOSIS — I10 ESSENTIAL HYPERTENSION: ICD-10-CM

## 2018-02-19 DIAGNOSIS — G47.33 OBSTRUCTIVE SLEEP APNEA SYNDROME: Chronic | ICD-10-CM

## 2018-02-19 DIAGNOSIS — I48.91 ATRIAL FIBRILLATION, UNSPECIFIED TYPE: ICD-10-CM

## 2018-02-19 DIAGNOSIS — I25.10 CORONARY ARTERY DISEASE INVOLVING NATIVE CORONARY ARTERY OF NATIVE HEART WITHOUT ANGINA PECTORIS: Chronic | ICD-10-CM

## 2018-02-19 LAB
DIASTOLIC DYSFUNCTION: YES
RETIRED EF AND QEF - SEE NOTES: 45 (ref 55–65)

## 2018-02-19 PROCEDURE — 93005 ELECTROCARDIOGRAM TRACING: CPT | Mod: PBBFAC | Performed by: INTERNAL MEDICINE

## 2018-02-19 PROCEDURE — 99214 OFFICE O/P EST MOD 30 MIN: CPT | Mod: S$PBB,,, | Performed by: INTERNAL MEDICINE

## 2018-02-19 PROCEDURE — 93010 ELECTROCARDIOGRAM REPORT: CPT | Mod: S$PBB,,, | Performed by: INTERNAL MEDICINE

## 2018-02-19 PROCEDURE — 93306 TTE W/DOPPLER COMPLETE: CPT | Mod: PBBFAC | Performed by: NUCLEAR MEDICINE

## 2018-02-19 PROCEDURE — 99999 PR PBB SHADOW E&M-EST. PATIENT-LVL I: CPT | Mod: PBBFAC,,,

## 2018-02-19 PROCEDURE — 99211 OFF/OP EST MAY X REQ PHY/QHP: CPT | Mod: PBBFAC,27

## 2018-02-19 PROCEDURE — 99212 OFFICE O/P EST SF 10 MIN: CPT | Mod: PBBFAC,25 | Performed by: INTERNAL MEDICINE

## 2018-02-19 PROCEDURE — 1159F MED LIST DOCD IN RCRD: CPT | Mod: ,,, | Performed by: INTERNAL MEDICINE

## 2018-02-19 PROCEDURE — 99999 PR PBB SHADOW E&M-EST. PATIENT-LVL II: CPT | Mod: PBBFAC,,, | Performed by: INTERNAL MEDICINE

## 2018-02-19 NOTE — PROGRESS NOTES
Subjective:    Patient ID:  Jethro Pitts is a 72 y.o. male who presents for follow-up of Atrial Flutter and Atrial Fibrillation      72 yoM atrial fibrillation, atrial flutter on Coumadin,  systolic CHF, cardiomyopathy, CAD s/p CABG, old MI, HTN, and hyperlipidemia. He presents to clinic today for hypotension. Home health Nurse took blood pressure and it was 70/50. It was reported he was feeling some generalize weakness, however he states he always fills some generalize weakness. Denies chest pain, increase shortness of breath, palpitations, syncope or near syncope. Vital signs are stable today in clinic. He has had several hospitalizations over the last few months with A Fib with RVR and Systolic Heart Failure. Patient was last admitted to [facility] in 3/2016, at that time he was loaded on Dig and started on Amiodarone. He was last seen in clinic on 3/29/2016 and was doing ok. 2 D Echo on 12/30/2016 showed severely depressed left ventricular systolic function (EF 15-20%). Was schedule an ablation on 2/23/2016, however was cancel because DIONNE showed aq clot on his RADHA and RAA.     7/16: He underwent successful PVI and AFL ablation 5/24/16 without complication. He remains on digoxin, amiodarone 200 mg bid, coreg and warfarin. His pre-procedure DIONNE showed EF 60% in NSR. He feels markedly improved. Less dyspnea, fatigue & edema.     10/16: Amiodarone and digoxin stopped July 2016. He remains on coreg and warfarin. No active complaints. Feels well, lost 40 pounds this year. Remains in SR by symptoms as well as by ECG. Echo shows EF 40-45%, improved from 15% in AF/AFL.     2/17: No symptomatic recurrence. Remains on warfarin due to history of RADHA thrombus.    Interval history: The patient denies interval chest pain, sob, palpitations, syncope, near syncope. No symptomatic recurrence of AF/AFL.     Echo 10/16:  CONCLUSIONS     1 - Mild left atrial enlargement.     2 - Eccentric hypertrophy.     3 - Mildly to moderately  depressed left ventricular systolic function (EF 40-45%).     4 - Normal left ventricular diastolic function.     5 - Normal right ventricular systolic function .     6 - Trivial to mild mitral regurgitation.     7 - The estimated PA systolic pressure is greater than 29 mmHg.     8 - Trivial to mild tricuspid regurgitation.     Past Medical History:  No date: Acute coronary syndrome  No date: Anticoagulant long-term use  No date: Atrial fibrillation  No date: Atrial flutter  No date: CHF (congestive heart failure)  No date: Coronary artery disease  No date: Depression  No date: Encounter for blood transfusion  No date: Hyperlipidemia  No date: Hypertension  No date: Old MI (myocardial infarction)  12/5/2013: S/P CABG (coronary artery bypass graft)  12/5/2013: S/P PTCA (percutaneous transluminal coronary a*  No date: Sleep apnea    Past Surgical History:  No date: CORONARY ANGIOPLASTY  No date: CORONARY ARTERY BYPASS GRAFT  No date: EYE SURGERY  No date: KNEE SURGERY  05/24/2016: RADIOFREQUENCY ABLATION      Comment: AFL and AF     Social History    Marital status:              Spouse name:                       Years of education:                 Number of children:               Occupational History    None on file    Social History Main Topics    Smoking status: Former Smoker                                                                Packs/day: 0.50      Years: 15.00          Quit date: 12/5/1983       Smokeless tobacco: Not on file                       Alcohol use: No              Drug use: No              Sexual activity: Not on file          Other Topics            Concern    None on file    Social History Narrative    None on file    Review of patient's family history indicates:    Coronary artery disease        Mother                    Coronary artery disease        Sister                    Coronary artery disease        Brother                       Review of Systems   Constitution: Negative  for malaise/fatigue.   HENT: Negative.    Eyes: Negative.    Cardiovascular: Negative for chest pain, dyspnea on exertion, irregular heartbeat, near-syncope, orthopnea, palpitations and syncope.   Respiratory: Negative.    Endocrine: Negative.    Skin: Negative.    Musculoskeletal: Negative.    Gastrointestinal: Negative.    Genitourinary: Negative.    Neurological: Negative.    Psychiatric/Behavioral: Negative.    Allergic/Immunologic: Negative.         Objective:    Physical Exam   Constitutional: He is oriented to person, place, and time. He appears well-developed and well-nourished. No distress.   HENT:   Head: Normocephalic and atraumatic.   Eyes: Conjunctivae and EOM are normal. Pupils are equal, round, and reactive to light. Right eye exhibits no discharge. Left eye exhibits no discharge.   Neck: Neck supple. No JVD present. No tracheal deviation present. No thyromegaly present.   Cardiovascular: Normal rate, regular rhythm, S1 normal, S2 normal, normal heart sounds and intact distal pulses.  PMI is not displaced.  Exam reveals no gallop, no S3, no S4 and no friction rub.    No murmur heard.  Pulses:       Radial pulses are 2+ on the right side, and 2+ on the left side.   Pulmonary/Chest: Effort normal and breath sounds normal. No respiratory distress. He has no wheezes. He has no rales.   Abdominal: Soft. He exhibits no distension. There is no tenderness. There is no rebound.   Musculoskeletal: He exhibits no edema.   Neurological: He is alert and oriented to person, place, and time. No cranial nerve deficit.   Skin: Skin is warm and dry. He is not diaphoretic. No erythema.   Psychiatric: He has a normal mood and affect. His behavior is normal. Judgment and thought content normal.   Nursing note and vitals reviewed.    ECG: NSR nl SD, QRS, QTc        Assessment:       1. Persistent atrial fibrillation    2. Typical atrial flutter    3. S/P CABG (coronary artery bypass graft)    4. Obstructive sleep apnea  syndrome    5. Essential hypertension         Plan:       72 yoM persistent AF, AFL, HTN here for routine follow up. No recurrence off amiodarone. Continue warfarin for CVA prophylaxis. Continue coreg 6.25 mg bid. RTC 1y or as needed

## 2018-02-23 ENCOUNTER — TELEPHONE (OUTPATIENT)
Dept: CARDIOLOGY | Facility: HOSPITAL | Age: 73
End: 2018-02-23

## 2018-02-23 NOTE — TELEPHONE ENCOUNTER
Please phone patient and let him know his echo showed improved HF (EF=45-50%) and DD. Continue same mgmt. Keep scheduled f/u    Thanks

## 2018-03-06 ENCOUNTER — ANTI-COAG VISIT (OUTPATIENT)
Dept: CARDIOLOGY | Facility: CLINIC | Age: 73
End: 2018-03-06
Payer: MEDICARE

## 2018-03-06 DIAGNOSIS — Z79.01 LONG TERM (CURRENT) USE OF ANTICOAGULANTS: Primary | ICD-10-CM

## 2018-03-06 LAB — INR PPP: 2 (ref 2.5–3)

## 2018-03-06 PROCEDURE — 85610 PROTHROMBIN TIME: CPT | Mod: PBBFAC

## 2018-03-06 PROCEDURE — 99999 PR PBB SHADOW E&M-EST. PATIENT-LVL I: CPT | Mod: PBBFAC,,,

## 2018-03-06 PROCEDURE — 99211 OFF/OP EST MAY X REQ PHY/QHP: CPT | Mod: PBBFAC

## 2018-03-06 NOTE — PROGRESS NOTES
INR is now sub-therapeutic. Patient confirms dose and compliance. Denies changes that would affect INR. Will increase total weekly dose until follow-up. Repeat INR next week when at the clinic for other appointments.

## 2018-03-15 ENCOUNTER — OFFICE VISIT (OUTPATIENT)
Dept: CARDIOLOGY | Facility: CLINIC | Age: 73
End: 2018-03-15
Payer: MEDICARE

## 2018-03-15 ENCOUNTER — ANTI-COAG VISIT (OUTPATIENT)
Dept: CARDIOLOGY | Facility: CLINIC | Age: 73
End: 2018-03-15
Payer: MEDICARE

## 2018-03-15 VITALS
HEART RATE: 82 BPM | WEIGHT: 213.19 LBS | HEIGHT: 69 IN | BODY MASS INDEX: 31.58 KG/M2 | DIASTOLIC BLOOD PRESSURE: 74 MMHG | SYSTOLIC BLOOD PRESSURE: 130 MMHG

## 2018-03-15 DIAGNOSIS — Z98.61 S/P PTCA (PERCUTANEOUS TRANSLUMINAL CORONARY ANGIOPLASTY): ICD-10-CM

## 2018-03-15 DIAGNOSIS — I48.19 PERSISTENT ATRIAL FIBRILLATION: ICD-10-CM

## 2018-03-15 DIAGNOSIS — Z95.1 S/P CABG (CORONARY ARTERY BYPASS GRAFT): ICD-10-CM

## 2018-03-15 DIAGNOSIS — R55 NEAR SYNCOPE: ICD-10-CM

## 2018-03-15 DIAGNOSIS — I10 ESSENTIAL HYPERTENSION: ICD-10-CM

## 2018-03-15 DIAGNOSIS — E78.5 HYPERLIPIDEMIA, UNSPECIFIED HYPERLIPIDEMIA TYPE: Chronic | ICD-10-CM

## 2018-03-15 DIAGNOSIS — F10.10 ETOH ABUSE: Chronic | ICD-10-CM

## 2018-03-15 DIAGNOSIS — Z79.01 LONG TERM (CURRENT) USE OF ANTICOAGULANTS: Primary | ICD-10-CM

## 2018-03-15 DIAGNOSIS — I25.2 OLD MI (MYOCARDIAL INFARCTION): ICD-10-CM

## 2018-03-15 DIAGNOSIS — I25.10 CORONARY ARTERY DISEASE INVOLVING NATIVE CORONARY ARTERY OF NATIVE HEART WITHOUT ANGINA PECTORIS: Primary | Chronic | ICD-10-CM

## 2018-03-15 LAB — INR PPP: 1.9 (ref 2.5–3)

## 2018-03-15 PROCEDURE — 99213 OFFICE O/P EST LOW 20 MIN: CPT | Mod: S$PBB,,, | Performed by: PHYSICIAN ASSISTANT

## 2018-03-15 PROCEDURE — 85610 PROTHROMBIN TIME: CPT | Mod: PBBFAC

## 2018-03-15 PROCEDURE — 99999 PR PBB SHADOW E&M-EST. PATIENT-LVL III: CPT | Mod: PBBFAC,,, | Performed by: PHYSICIAN ASSISTANT

## 2018-03-15 PROCEDURE — 99213 OFFICE O/P EST LOW 20 MIN: CPT | Mod: PBBFAC | Performed by: PHYSICIAN ASSISTANT

## 2018-03-15 NOTE — PROGRESS NOTES
INR is trending down despite dose adjustment. Patient confirms dose and compliance. Will increase total weekly dose. Repeat INR in 1 week with home meter.

## 2018-03-15 NOTE — PROGRESS NOTES
Subjective:    Patient ID:  Jethro Pitts is a 72 y.o. male who presents for follow-up of CAD, HTN, hyperlipidemia    HPI  Mr. Pitts is a 72 year old male patient with a PMHx of atrial fibrillation, atrial flutter (previoulsy on Eliquis, now on Coumadin s/p successful PVI and AFL ablation 5/24/16 by Dr. Villafuerte) systolic CHF, cardiomyopathy, CAD s/p CABG, old MI, HTN, and hyperlipidemia who presents today for routine follow-up. Patient returns today and states he is doing well. No cardiac complaints. Denies chest pain, SOB, or other anginal signs or symptoms. No lightheadedness, dizziness, palpitations, near syncope, or syncope. No signs of fluid overload. No signs/symptoms of TIA/CVA. BP stable and well-controlled on current meds. Patient reports compliance with his medications. Remains on Coumadin, no bleeding issues, does have home Alle[employer] home monitor. Scheduled to have labs with PCP in August, will fax results. 2D echo reviewed, showed EF of 45-50%, DD, +WMA.    Review of Systems   Constitution: Negative for chills, decreased appetite, fever, weakness and malaise/fatigue.   HENT: Negative for congestion, hoarse voice and sore throat.    Eyes: Negative for blurred vision and discharge.   Cardiovascular: Negative for chest pain, claudication, cyanosis, dyspnea on exertion, irregular heartbeat, leg swelling, near-syncope, orthopnea, palpitations and paroxysmal nocturnal dyspnea.   Respiratory: Negative for cough, hemoptysis, shortness of breath, snoring, sputum production and wheezing.    Endocrine: Negative for cold intolerance and heat intolerance.   Hematologic/Lymphatic: Negative for bleeding problem. Does not bruise/bleed easily.   Skin: Negative for rash.   Musculoskeletal: Negative for arthritis, back pain, joint pain, joint swelling, muscle cramps, muscle weakness and myalgias.   Gastrointestinal: Negative for abdominal pain, constipation, diarrhea, heartburn, melena and nausea.   Genitourinary: Negative  "for hematuria.   Neurological: Negative for dizziness, focal weakness, headaches, light-headedness, loss of balance, numbness, paresthesias and seizures.   Psychiatric/Behavioral: Negative for memory loss. The patient does not have insomnia.    Allergic/Immunologic: Negative for hives.       /74   Pulse 82 Comment: radial  Ht 5' 9" (1.753 m)   Wt 96.7 kg (213 lb 3 oz)   BMI 31.48 kg/m²   Objective:    Physical Exam   Constitutional: He is oriented to person, place, and time. He appears well-developed and well-nourished. No distress.   HENT:   Head: Normocephalic and atraumatic.   Eyes: Pupils are equal, round, and reactive to light. Right eye exhibits no discharge. Left eye exhibits no discharge.   Neck: Neck supple. No JVD present.   Cardiovascular: Normal rate, regular rhythm, S1 normal, S2 normal and normal heart sounds.    No murmur heard.  Pulmonary/Chest: Effort normal and breath sounds normal. No respiratory distress. He has no wheezes. He has no rales.   Abdominal: Soft. He exhibits no distension. There is no tenderness. There is no rebound.   Musculoskeletal: He exhibits no edema.   Neurological: He is alert and oriented to person, place, and time.   Skin: Skin is warm and dry. He is not diaphoretic. No erythema.   Psychiatric: He has a normal mood and affect. His behavior is normal. Thought content normal.   Nursing note and vitals reviewed.      2D echo CONCLUSIONS     1 - Concentric remodeling.     2 - Wall motion abnormalities.     3 - Mildly depressed left ventricular systolic function (EF 45-50%).     4 - Impaired LV relaxation, normal LAP (grade 1 diastolic dysfunction).     5 - Normal right ventricular systolic function .       Chemistry        Component Value Date/Time     10/24/2016 0952    K 4.6 10/24/2016 0952     10/24/2016 0952    CO2 23 10/24/2016 0952    BUN 12 10/24/2016 0952    CREATININE 0.9 10/24/2016 0952    GLU 84 10/24/2016 0952        Component Value Date/Time "    CALCIUM 9.1 10/24/2016 0952    ALKPHOS 57 10/24/2016 0952    AST 35 10/24/2016 0952    ALT 31 10/24/2016 0952    BILITOT 1.1 (H) 10/24/2016 0952    ESTGFRAFRICA >60.0 10/24/2016 0952    EGFRNONAA >60.0 10/24/2016 0952        Lab Results   Component Value Date    CHOL 151 04/24/2012    CHOL 151 07/25/2011    CHOL 152 06/09/2010     Lab Results   Component Value Date    HDL 58 04/24/2012    HDL 56 07/25/2011    HDL 48 06/09/2010     Lab Results   Component Value Date    LDLCALC 73.0 04/24/2012    LDLCALC 74.0 07/25/2011    LDLCALC 67.2 06/09/2010     Lab Results   Component Value Date    TRIG 102 04/24/2012    TRIG 105 07/25/2011    TRIG 184 (H) 06/09/2010     Lab Results   Component Value Date    CHOLHDL 38.4 04/24/2012    CHOLHDL 37.1 07/25/2011    CHOLHDL 31.6 06/09/2010       Assessment:       1. Coronary artery disease involving native coronary artery of native heart without angina pectoris    2. ETOH abuse    3. Persistent atrial fibrillation    4. Hyperlipidemia, unspecified hyperlipidemia type    5. Essential hypertension    6. Old MI (myocardial infarction)    7. S/P CABG (coronary artery bypass graft)    8. S/P PTCA (percutaneous transluminal coronary angioplasty)    9. Near syncope       Doing clinically well. No cardiac complaints. No angina or equivalent. No fluid overload. Tolerating meds. Remains on Coumadin. No bleeding issues. Continue current therapy.   Plan:   -Continue current medical management and risk factor modification  -Cardiac, low salt diet  -Continue Coumadin, has Allere monitor  -Scheduled for labs with PCP in August  -Follow-up in 6 months with PA or Dr. Raza    Chart reviewed. Dr. Raza agrees with plan as outlined above.

## 2018-03-18 LAB — INR PPP: 2.2

## 2018-03-20 ENCOUNTER — ANTI-COAG VISIT (OUTPATIENT)
Dept: CARDIOLOGY | Facility: CLINIC | Age: 73
End: 2018-03-20

## 2018-03-20 NOTE — PROGRESS NOTES
Faxed result taken from __[facility]_______. PT/INR __2.2____ Date drawn_3/18/2018.  Patient instructed to take 3.75 mg on Tues, Thurs and Sat, then 2.5 mg on remaining 4 days.  Recheck on Monday, 3/26/2018.

## 2018-03-26 LAB — INR PPP: 3.3

## 2018-03-27 ENCOUNTER — ANTI-COAG VISIT (OUTPATIENT)
Dept: CARDIOLOGY | Facility: CLINIC | Age: 73
End: 2018-03-27

## 2018-04-03 ENCOUNTER — ANTI-COAG VISIT (OUTPATIENT)
Dept: CARDIOLOGY | Facility: CLINIC | Age: 73
End: 2018-04-03

## 2018-04-03 LAB — INR PPP: 2.9

## 2018-04-03 NOTE — PROGRESS NOTES
Fax results taken from __[facility]____. PT/INR _2.9___ Date drawn 4/03/2018____   No changes in dosage.  Recheck in 1 week.  Please call should you have any questions or concerns at [phone] or [phone].

## 2018-04-10 ENCOUNTER — ANTI-COAG VISIT (OUTPATIENT)
Dept: CARDIOLOGY | Facility: CLINIC | Age: 73
End: 2018-04-10

## 2018-04-10 LAB — INR PPP: 3

## 2018-04-10 NOTE — PROGRESS NOTES
Fax result taken from __[facility]_____. INR _3___ Date drawn_4/10/2018.  No changes in dosage.  Recheck in 1 week.

## 2018-04-17 ENCOUNTER — ANTI-COAG VISIT (OUTPATIENT)
Dept: CARDIOLOGY | Facility: CLINIC | Age: 73
End: 2018-04-17
Payer: MEDICARE

## 2018-04-17 LAB — INR PPP: 3.2

## 2018-04-17 PROCEDURE — G0250 MD INR TEST REVIE INTER MGMT: HCPCS | Mod: ,,, | Performed by: INTERNAL MEDICINE

## 2018-04-17 NOTE — PROGRESS NOTES
Fax result taken from _[facility]_____. PT/INR __3.2____ Date drawn__4/17/2018___ .  Instructed patient to take 1/2 tablet (1.25 mg) today - only, then resume on regular scheduled dose.  Recheck in 1 week.

## 2018-04-24 ENCOUNTER — ANTI-COAG VISIT (OUTPATIENT)
Dept: CARDIOLOGY | Facility: CLINIC | Age: 73
End: 2018-04-24

## 2018-04-24 LAB — INR PPP: 3.8

## 2018-04-24 NOTE — PROGRESS NOTES
Patient's INR is elevated at 3.8.  Reports no medication changes or active bleeding at present.  Instructed to hold Warfarin dose today, then start new dose of 2.5 mg Sunday through Friday and 3.75 mg (1 1/2 tablet) on Saturday.  Recheck in 1 week.

## 2018-05-01 ENCOUNTER — ANTI-COAG VISIT (OUTPATIENT)
Dept: CARDIOLOGY | Facility: CLINIC | Age: 73
End: 2018-05-01

## 2018-05-01 LAB — INR PPP: 3.2

## 2018-05-01 NOTE — PROGRESS NOTES
Fax result taken from __[facility]______.  INR _3.2___ Date drawn__5/01/2018.  Reports no active bleeding at present.  Instructed to start new dose of 2.5 mg daily.  Recheck in 1 week.

## 2018-05-08 LAB — INR PPP: 2.3

## 2018-05-10 ENCOUNTER — ANTI-COAG VISIT (OUTPATIENT)
Dept: CARDIOLOGY | Facility: CLINIC | Age: 73
End: 2018-05-10

## 2018-05-10 NOTE — PROGRESS NOTES
.Fax result taken from _[facility]______. INR __2.3_____ Date drawn__5/08/2018.  Reports no missed doses or diet changes.  Instructed to start new dose of 3.75 mg (1 1/2 tablet) on Thursdays and 2.5 mg on all other days.  Recheck in 1 week.

## 2018-05-15 ENCOUNTER — ANTI-COAG VISIT (OUTPATIENT)
Dept: CARDIOLOGY | Facility: CLINIC | Age: 73
End: 2018-05-15

## 2018-05-15 LAB — INR PPP: 2.3

## 2018-05-15 NOTE — PROGRESS NOTES
Fax result taken from _[facility]_____.   INR _2.3_____ Date drawn__5/15/2018.  Reports no missed doses.  Vitamin K foods - discussed.  Instructed to take 3.75 mg (1 1/2 tablet) today (Tuesday) - only, then resume on regular scheduled dose.  Recheck in 1 week.

## 2018-05-22 ENCOUNTER — ANTI-COAG VISIT (OUTPATIENT)
Dept: CARDIOLOGY | Facility: CLINIC | Age: 73
End: 2018-05-22
Payer: MEDICARE

## 2018-05-22 LAB — INR PPP: 2.4

## 2018-05-22 PROCEDURE — G0250 MD INR TEST REVIE INTER MGMT: HCPCS | Mod: ,,, | Performed by: INTERNAL MEDICINE

## 2018-05-22 NOTE — PROGRESS NOTES
Fax result taken from __[facility]_____.  INR _2.4__ Date drawn_5/22/2018.  Reports no missed doses or diet changes.  Instructed to start new dose of 3.75 mg (1 1/2 tablet) on Tuesdays and Thursdays, then 2.5 mg on all other days.  Recheck in 1 week.

## 2018-05-29 LAB — INR PPP: 4.1 (ref 2.5–3.5)

## 2018-05-30 ENCOUNTER — ANTI-COAG VISIT (OUTPATIENT)
Dept: CARDIOLOGY | Facility: CLINIC | Age: 73
End: 2018-05-30

## 2018-05-30 DIAGNOSIS — Z79.01 LONG TERM (CURRENT) USE OF ANTICOAGULANTS: Primary | ICD-10-CM

## 2018-05-30 LAB
INR PPP: 4.1 (ref 2.5–3.5)
INR PPP: 4.1 (ref 2.5–3.5)

## 2018-05-30 NOTE — PROGRESS NOTES
Patient has been instructed to hold coumadin dosage today 5/30/18; resume on 5/31/18 taking one and one-half of 2.5 mg tablet; then 2.5 mg on all other days. Recheck on 6/5/18. Repeated back correctly. Patient to call our office with any questions at [phone] or [phone].

## 2018-05-30 NOTE — PROGRESS NOTES
Patient's INR done 5/29/18 is supratherapeutic at 4.1. States no change in dose/diet. States has been in a lot of pain due to a pinched nerve and has been taking flexeril since 5/27/18 and was taking tylenol prior. States took coumadin 2.5 mg on 5/29/18.

## 2018-06-05 ENCOUNTER — ANTI-COAG VISIT (OUTPATIENT)
Dept: CARDIOLOGY | Facility: CLINIC | Age: 73
End: 2018-06-05
Payer: MEDICARE

## 2018-06-05 LAB — INR PPP: 2.4

## 2018-06-05 PROCEDURE — G0250 MD INR TEST REVIE INTER MGMT: HCPCS | Mod: ,,, | Performed by: INTERNAL MEDICINE

## 2018-06-05 NOTE — PROGRESS NOTES
Fax result taken from [facility]_____.  INR _2.4___ Date drawn_6/05/2018.  Patient reports no missed doses.  Instructed to keep diet consistent.  No changes in dose per dose calendar.  Recheck in 1 week.  Please call should you have any questions or concerns at [phone] or [phone].

## 2018-06-06 RX ORDER — WARFARIN 2.5 MG/1
TABLET ORAL
Qty: 90 TABLET | Refills: 0 | Status: SHIPPED | OUTPATIENT
Start: 2018-06-06 | End: 2018-09-21 | Stop reason: SDUPTHER

## 2018-06-12 ENCOUNTER — ANTI-COAG VISIT (OUTPATIENT)
Dept: CARDIOLOGY | Facility: CLINIC | Age: 73
End: 2018-06-12

## 2018-06-12 LAB — INR PPP: 2.3

## 2018-06-12 NOTE — PROGRESS NOTES
Fax result taken from _[facility]____.  INR _2.3___ Date drawn  6/12/2018.  Instructed to take 3.75 mg (1 1/2 tablet) on Tues, Thurs and Sat, then 2.5 mg on all other days.  Recheck in 1 week.

## 2018-06-19 ENCOUNTER — ANTI-COAG VISIT (OUTPATIENT)
Dept: CARDIOLOGY | Facility: CLINIC | Age: 73
End: 2018-06-19

## 2018-06-19 LAB — INR PPP: 3.9

## 2018-06-19 NOTE — PROGRESS NOTES
Fax result taken from _[facility]___.   NR 3.9__ Date drawn_6/19/2018.  Reports no signs of any abnormal bleeding at present.  Instructed to start new dose of 5 mg on Wednesday and 2.5 mg on all other days.  Recheck in 1 week.

## 2018-06-26 ENCOUNTER — ANTI-COAG VISIT (OUTPATIENT)
Dept: CARDIOLOGY | Facility: CLINIC | Age: 73
End: 2018-06-26

## 2018-06-26 ENCOUNTER — TELEPHONE (OUTPATIENT)
Dept: CARDIOLOGY | Facility: CLINIC | Age: 73
End: 2018-06-26

## 2018-06-26 LAB — INR PPP: 4

## 2018-06-26 NOTE — PROGRESS NOTES
Fax result taken from _[facility]__.   INR _4__ Date drawn _ 6/26/2018.   Reports a change in medication:  Started on Cyclobenzaprine (Flexeril) at night.  Reports no signs of any abnormal bleeding at present.  Instructed to hold Warfarin dose today - only, then start 2.5 mg daily.  Recheck in 1 week.  Advised to seek medical attention (ED) for any abnormal bleeding, if needed.  Patient voiced understanding.

## 2018-06-26 NOTE — TELEPHONE ENCOUNTER
----- Message from Zach Salgado sent at 6/26/2018 10:39 AM CDT -----  Contact: Sandra, pt's spouse  She's calling in regards to the pt's canceled appt in September, [phone] (home) [phone] (work)

## 2018-07-03 ENCOUNTER — ANTI-COAG VISIT (OUTPATIENT)
Dept: CARDIOLOGY | Facility: CLINIC | Age: 73
End: 2018-07-03

## 2018-07-03 LAB — INR PPP: 3.7

## 2018-07-03 NOTE — PROGRESS NOTES
Fax result taken from __[facility]___.   INR _3.7_ Date drawn_7/03/2018.  Reports no changes in medication/no signs of any abnormal bleeding.  Instructed to start new dose of 1.25 mg (1/2 tablet) on Tuesdays and 2.5 mg on all other days.  Recheck in 1 week.

## 2018-07-09 ENCOUNTER — TELEPHONE (OUTPATIENT)
Dept: CARDIOLOGY | Facility: CLINIC | Age: 73
End: 2018-07-09

## 2018-07-09 NOTE — TELEPHONE ENCOUNTER
Returned phone call and rescheduled Jonh STEINBERG 's 6 month follow -up    ----- Message from Love Lopez sent at 7/9/2018 11:16 AM CDT -----  Contact: Sandra/wife  Patient returned call, Please call her at [phone].    Thanks  Td

## 2018-07-10 ENCOUNTER — ANTI-COAG VISIT (OUTPATIENT)
Dept: CARDIOLOGY | Facility: CLINIC | Age: 73
End: 2018-07-10
Payer: MEDICARE

## 2018-07-10 ENCOUNTER — OFFICE VISIT (OUTPATIENT)
Dept: CARDIOLOGY | Facility: CLINIC | Age: 73
End: 2018-07-10
Payer: MEDICARE

## 2018-07-10 VITALS
BODY MASS INDEX: 31.58 KG/M2 | SYSTOLIC BLOOD PRESSURE: 126 MMHG | WEIGHT: 213.19 LBS | HEART RATE: 83 BPM | DIASTOLIC BLOOD PRESSURE: 90 MMHG | HEIGHT: 69 IN

## 2018-07-10 DIAGNOSIS — I48.19 PERSISTENT ATRIAL FIBRILLATION: ICD-10-CM

## 2018-07-10 DIAGNOSIS — Z98.61 S/P PTCA (PERCUTANEOUS TRANSLUMINAL CORONARY ANGIOPLASTY): ICD-10-CM

## 2018-07-10 DIAGNOSIS — I25.2 OLD MI (MYOCARDIAL INFARCTION): ICD-10-CM

## 2018-07-10 DIAGNOSIS — Z95.1 S/P CABG (CORONARY ARTERY BYPASS GRAFT): ICD-10-CM

## 2018-07-10 DIAGNOSIS — I50.9 CHF (NYHA CLASS III, ACC/AHA STAGE C): Chronic | ICD-10-CM

## 2018-07-10 DIAGNOSIS — E78.5 HYPERLIPIDEMIA, UNSPECIFIED HYPERLIPIDEMIA TYPE: Chronic | ICD-10-CM

## 2018-07-10 DIAGNOSIS — Z01.818 PREOPERATIVE CLEARANCE: Primary | ICD-10-CM

## 2018-07-10 DIAGNOSIS — I25.10 CORONARY ARTERY DISEASE INVOLVING NATIVE CORONARY ARTERY OF NATIVE HEART WITHOUT ANGINA PECTORIS: Chronic | ICD-10-CM

## 2018-07-10 DIAGNOSIS — I10 ESSENTIAL HYPERTENSION: ICD-10-CM

## 2018-07-10 LAB — INR PPP: 2

## 2018-07-10 PROCEDURE — 99213 OFFICE O/P EST LOW 20 MIN: CPT | Mod: S$PBB,,, | Performed by: NURSE PRACTITIONER

## 2018-07-10 PROCEDURE — 99999 PR PBB SHADOW E&M-EST. PATIENT-LVL III: CPT | Mod: PBBFAC,,, | Performed by: NURSE PRACTITIONER

## 2018-07-10 PROCEDURE — 99213 OFFICE O/P EST LOW 20 MIN: CPT | Mod: PBBFAC,25 | Performed by: NURSE PRACTITIONER

## 2018-07-10 PROCEDURE — G0250 MD INR TEST REVIE INTER MGMT: HCPCS | Mod: ,,, | Performed by: INTERNAL MEDICINE

## 2018-07-10 PROCEDURE — 93010 ELECTROCARDIOGRAM REPORT: CPT | Mod: S$PBB,,, | Performed by: INTERNAL MEDICINE

## 2018-07-10 PROCEDURE — 93005 ELECTROCARDIOGRAM TRACING: CPT | Mod: PBBFAC | Performed by: INTERNAL MEDICINE

## 2018-07-10 NOTE — PROGRESS NOTES
Subjective:    Patient ID:  Jethro Pitts is a 73 y.o. male who presents for follow-up of Preoperative Clearance      HPI   Mr. Pitts is a 73 year old male patient with a PMHx of atrial fibrillation, atrial flutter on Coumadin, s/p successful PVI and AFL ablation 5/24/16 by Dr. Villafuerte, systolic HF, cardiomyopathy, CAD s/p CABG, old MI, HTN, and HLP who presents today for preop clearance. He returns today and states that he is doing ok. He is scheduled for neck surgery by Dr. Salcido on July 17th at [facility] with planned overnight stay. He denies any chest pain, chest discomfort or angina. Denies shortness of breath, palpitations, or other anginal symptoms. No syncope, near syncope, lightheadedness, dizziness. No signs of fluid overload on exam today. No CNS complaints to suggest TIA or CVA today. No signs of abnormal bleeding. BP well controlled on current regimen. Reports compliance with medications and dietary restrictions at this time.  12 lead ECG reviewed, shows NSR, rate 83.      Review of Systems   Constitution: Negative for weakness.   HENT: Negative for hearing loss and hoarse voice.    Eyes: Negative for visual disturbance.   Cardiovascular: Negative for chest pain, claudication, dyspnea on exertion, irregular heartbeat, leg swelling, near-syncope, orthopnea, palpitations, paroxysmal nocturnal dyspnea and syncope.   Respiratory: Negative for cough, hemoptysis, shortness of breath, sleep disturbances due to breathing, snoring and wheezing.    Endocrine: Negative for cold intolerance and heat intolerance.   Hematologic/Lymphatic: Bruises/bleeds easily.   Skin: Negative for color change, dry skin and nail changes.   Musculoskeletal: Positive for arthritis, joint pain, neck pain and stiffness. Negative for back pain and myalgias.   Gastrointestinal: Negative for bloating, abdominal pain, constipation, nausea and vomiting.   Genitourinary: Negative for dysuria, flank pain, hematuria and hesitancy.   Neurological:  "Negative for headaches, light-headedness, loss of balance, numbness and paresthesias.   Psychiatric/Behavioral: Negative for altered mental status.   Allergic/Immunologic: Negative for environmental allergies.       BP (!) 126/90   Pulse 83   Ht 5' 9" (1.753 m)   Wt 96.7 kg (213 lb 3 oz)   BMI 31.48 kg/m²     Objective:    Physical Exam   Constitutional: He is oriented to person, place, and time. He appears well-developed and well-nourished. No distress.   HENT:   Head: Normocephalic and atraumatic.   Eyes: Pupils are equal, round, and reactive to light.   Neck: Normal range of motion and full passive range of motion without pain. Neck supple. No JVD present.   Cardiovascular: Normal rate, regular rhythm, S1 normal, S2 normal and intact distal pulses.  PMI is not displaced.  Exam reveals no distant heart sounds.    No murmur heard.  Pulses:       Radial pulses are 2+ on the right side, and 2+ on the left side.        Dorsalis pedis pulses are 2+ on the right side, and 2+ on the left side.   Pulmonary/Chest: Effort normal and breath sounds normal. No respiratory distress. He has no wheezes.   Abdominal: Soft. Bowel sounds are normal. He exhibits no distension. There is no tenderness.   Musculoskeletal: Normal range of motion. He exhibits no edema.        Right ankle: He exhibits no swelling.        Left ankle: He exhibits no swelling.   Neurological: He is alert and oriented to person, place, and time.   Skin: Skin is warm and dry. He is not diaphoretic. No cyanosis. Nails show no clubbing.   Psychiatric: He has a normal mood and affect. His speech is normal and behavior is normal. Judgment and thought content normal. Cognition and memory are normal.   Nursing note and vitals reviewed.      2D Echo CONCLUSIONS     1 - Concentric remodeling.     2 - Wall motion abnormalities.     3 - Mildly depressed left ventricular systolic function (EF 45-50%).     4 - Impaired LV relaxation, normal LAP (grade 1 diastolic " dysfunction).     5 - Normal right ventricular systolic function .             This document has been electronically    SIGNED BY: Blane Gregorio MD On: 02/19/2018 14:26  Assessment:       1. Preoperative clearance    2. Coronary artery disease involving native coronary artery of native heart without angina pectoris    3. Hyperlipidemia, unspecified hyperlipidemia type    4. CHF (NYHA class III, ACC/AHA stage C)    5. Old MI (myocardial infarction)    6. Essential hypertension    7. S/P PTCA (percutaneous transluminal coronary angioplasty)    8. S/P CABG (coronary artery bypass graft)    9. Persistent atrial fibrillation      Doing clinically well today in clinic.   BP looks well controlled on current medications.   Denies chest pain, chest discomfort or anginal equivalents.  No shortness of breath or palpitations.   Compliant with medications and dietary restrictions.   Plan:   Elevated periop risk of CV events for low to moderate risk procedure.  Good functional and exercise capacity.  No chest pain, active arrhythmia and CHF symptoms.  Ok to proceed the scheduled surgery without further cardiac study.  OK to hold Coumadin 5 to 7 days before the procedure and resume ASAP postop.  Continue Carvedilol and Statin periop.  Avoid periop fluid overloaded.    Keep scheduled follow up with Donna.   Continue same medications.   Follow up with Coumadin clinic after surgery

## 2018-07-10 NOTE — PROGRESS NOTES
Fax result taken from _[facility]_.   INR _2.0__ Date drawn_7/10/2018.  Patient reports having a neck procedure on Tuesday, 7/17/2018 and was instructed to hold his Warfarin dose today until procedure (preop clearance given: see cardio notes 7/10).  Patient was instructed to resume Warfarin 2.5 mg daily after procedure or per physician's discharged order.  Recheck on 7/24/2018.

## 2018-07-11 DIAGNOSIS — I25.10 CORONARY ARTERY DISEASE INVOLVING NATIVE CORONARY ARTERY OF NATIVE HEART WITHOUT ANGINA PECTORIS: Primary | Chronic | ICD-10-CM

## 2018-07-19 ENCOUNTER — TELEPHONE (OUTPATIENT)
Dept: CARDIOLOGY | Facility: CLINIC | Age: 73
End: 2018-07-19

## 2018-07-19 RX ORDER — FUROSEMIDE 20 MG/1
20 TABLET ORAL DAILY
Qty: 30 TABLET | Refills: 0 | Status: SHIPPED | OUTPATIENT
Start: 2018-07-19 | End: 2018-09-20

## 2018-07-19 NOTE — TELEPHONE ENCOUNTER
----- Message from Donna Veronica PA-C sent at 7/19/2018 10:58 AM CDT -----  Continue until f/u next week unless he loses a drastic amount of weight; may appt early in week please    Thanks

## 2018-07-20 ENCOUNTER — TELEPHONE (OUTPATIENT)
Dept: CARDIOLOGY | Facility: CLINIC | Age: 73
End: 2018-07-20

## 2018-07-20 NOTE — TELEPHONE ENCOUNTER
I discussed re-starting Coumadin with Dr. Raza and Dr. Salcido who performed recent surgery.     Would prefer to start Coumadin sooner than later. Dr. Salcido verbalized ok to re-start on Monday.

## 2018-07-24 ENCOUNTER — TELEPHONE (OUTPATIENT)
Dept: CARDIOLOGY | Facility: HOSPITAL | Age: 73
End: 2018-07-24

## 2018-07-24 NOTE — TELEPHONE ENCOUNTER
Spoke with pt wife states pt is doing fine states pt had 6lbs weight gain states pt took fluid pills and is doing good.      Pt wife wants to know if pt still needs to follow up with Ama this week. Please advise.

## 2018-07-26 ENCOUNTER — OFFICE VISIT (OUTPATIENT)
Dept: CARDIOLOGY | Facility: CLINIC | Age: 73
End: 2018-07-26
Payer: MEDICARE

## 2018-07-26 VITALS
BODY MASS INDEX: 30.92 KG/M2 | HEIGHT: 69 IN | DIASTOLIC BLOOD PRESSURE: 82 MMHG | WEIGHT: 208.75 LBS | HEART RATE: 84 BPM | SYSTOLIC BLOOD PRESSURE: 118 MMHG

## 2018-07-26 DIAGNOSIS — Z98.61 S/P PTCA (PERCUTANEOUS TRANSLUMINAL CORONARY ANGIOPLASTY): ICD-10-CM

## 2018-07-26 DIAGNOSIS — Z95.1 S/P CABG (CORONARY ARTERY BYPASS GRAFT): ICD-10-CM

## 2018-07-26 DIAGNOSIS — I50.9 CHF (NYHA CLASS III, ACC/AHA STAGE C): Primary | Chronic | ICD-10-CM

## 2018-07-26 DIAGNOSIS — I10 ESSENTIAL HYPERTENSION: ICD-10-CM

## 2018-07-26 DIAGNOSIS — I25.10 CORONARY ARTERY DISEASE INVOLVING NATIVE CORONARY ARTERY OF NATIVE HEART WITHOUT ANGINA PECTORIS: Chronic | ICD-10-CM

## 2018-07-26 DIAGNOSIS — E78.5 HYPERLIPIDEMIA, UNSPECIFIED HYPERLIPIDEMIA TYPE: Chronic | ICD-10-CM

## 2018-07-26 DIAGNOSIS — I25.2 OLD MI (MYOCARDIAL INFARCTION): ICD-10-CM

## 2018-07-26 PROCEDURE — 99999 PR PBB SHADOW E&M-EST. PATIENT-LVL III: CPT | Mod: PBBFAC,,, | Performed by: NURSE PRACTITIONER

## 2018-07-26 PROCEDURE — 99213 OFFICE O/P EST LOW 20 MIN: CPT | Mod: S$PBB,,, | Performed by: NURSE PRACTITIONER

## 2018-07-26 PROCEDURE — 99213 OFFICE O/P EST LOW 20 MIN: CPT | Mod: PBBFAC | Performed by: NURSE PRACTITIONER

## 2018-07-26 NOTE — PROGRESS NOTES
Subjective:    Patient ID:  Jethro Pitts is a 73 y.o. male who presents for follow-up of Follow-up      HPI   Mr. Pitts is a 73 year old male with a PMHx of A-fib, A-flutter on Coumadin, s/p successful PVI and AFL ablation 5/16 by Dr. Villafuerte, systolic HF, cardiomyopathy, CAD s/p CABG, old MI, HTN, and HLP who presents to clinic today for follow up and symptom check. He returns today and states that is doing good since surgery. He had schedule surgery per Dr. Salcido and had weight gain of 6 lbs after hospital stay. He took 1 Lasix and lost 10 lbs. Denies chest pain, chest discomfort or anginal equivalents. No SOB, dyspnea, palpitations, or leg swelling. No CNS complaints to suggest TIA or CVA. No claudication pain. Compliant with medications and dietary restrictions. BP well controlled today. No signs of fluid overload on exam today.       Review of Systems   Constitution: Negative for weakness and malaise/fatigue.   HENT: Negative for hearing loss and hoarse voice.    Eyes: Negative for visual disturbance.   Cardiovascular: Negative for chest pain, claudication, dyspnea on exertion, irregular heartbeat, leg swelling, near-syncope, orthopnea, palpitations, paroxysmal nocturnal dyspnea and syncope.   Respiratory: Negative for cough, hemoptysis, shortness of breath, sleep disturbances due to breathing, snoring and wheezing.    Endocrine: Negative for cold intolerance and heat intolerance.   Hematologic/Lymphatic: Bruises/bleeds easily.   Skin: Negative for color change, dry skin and nail changes.   Musculoskeletal: Positive for arthritis and joint pain. Negative for back pain, myalgias, neck pain and stiffness.        +neck brace post surgery   Gastrointestinal: Negative for bloating, abdominal pain, constipation, nausea and vomiting.   Genitourinary: Negative for dysuria, flank pain, hematuria and hesitancy.   Neurological: Negative for headaches, light-headedness, loss of balance, numbness and paresthesias.  "  Psychiatric/Behavioral: Negative for altered mental status.   Allergic/Immunologic: Negative for environmental allergies.       /82   Pulse 84   Ht 5' 9" (1.753 m)   Wt 94.7 kg (208 lb 12.4 oz)   BMI 30.83 kg/m²     Objective:    Physical Exam   Constitutional: He is oriented to person, place, and time. He appears well-developed and well-nourished. No distress.   HENT:   Head: Normocephalic and atraumatic.   Eyes: Pupils are equal, round, and reactive to light.   Neck: Normal range of motion and full passive range of motion without pain. Neck supple. No JVD present.   Cardiovascular: Normal rate, regular rhythm, S1 normal, S2 normal and intact distal pulses.  PMI is not displaced.  Exam reveals no distant heart sounds.    No murmur heard.  Pulses:       Radial pulses are 2+ on the right side, and 2+ on the left side.        Dorsalis pedis pulses are 2+ on the right side, and 2+ on the left side.   Pulmonary/Chest: Effort normal and breath sounds normal. No respiratory distress. He has no wheezes.   Abdominal: Soft. Bowel sounds are normal. He exhibits no distension. There is no tenderness.   Musculoskeletal: Normal range of motion. He exhibits no edema.        Right ankle: He exhibits no swelling.        Left ankle: He exhibits no swelling.   + neck brace post surgery   Neurological: He is alert and oriented to person, place, and time.   Skin: Skin is warm and dry. He is not diaphoretic. No cyanosis. Nails show no clubbing.   Psychiatric: He has a normal mood and affect. His speech is normal and behavior is normal. Judgment and thought content normal. Cognition and memory are normal.   Nursing note and vitals reviewed.      Assessment:       1. CHF (NYHA class III, ACC/AHA stage C)    2. Coronary artery disease involving native coronary artery of native heart without angina pectoris    3. Hyperlipidemia, unspecified hyperlipidemia type    4. Old MI (myocardial infarction)    5. Essential hypertension  "   6. S/P CABG (coronary artery bypass graft)    7. S/P PTCA (percutaneous transluminal coronary angioplasty)      BP well controlled today in clinic.  No signs of HF on exam today.  Increase weight resolved after 1 dose of PO Lasix  No chest pain, chest discomfort or anginal equivalents today.  Doing well after surgery, remains in neck brace  No SOB, dyspnea, leg swelling.   Compliant with medications and diet  Plan:   Continue same CV meds  Keep scheduled follow up with Donna  Daily weights  If weight increases by 3 lbs in 1 day or 5 lbs in 1 week, call clinic.  50-60 ounces of fluid per day  DASH diet, 2 gm sodium restriction  Keep follow up with Coumadin clinic  Encourage physical activity as tolerated.

## 2018-07-31 ENCOUNTER — ANTI-COAG VISIT (OUTPATIENT)
Dept: CARDIOLOGY | Facility: CLINIC | Age: 73
End: 2018-07-31
Payer: MEDICARE

## 2018-07-31 LAB — INR PPP: 1.3

## 2018-07-31 PROCEDURE — 99999 PR PBB SHADOW E&M-EST. PATIENT-LVL I: CPT | Mod: PBBFAC,,,

## 2018-07-31 PROCEDURE — 99211 OFF/OP EST MAY X REQ PHY/QHP: CPT | Mod: PBBFAC

## 2018-07-31 NOTE — PROGRESS NOTES
Fax result taken from _[facility]__.    INR _1.3__ Date drawn_7/31/2018.  Post neck procedure (7/17/2018). Spoke with Mrs. Pitts (spouse):   Reported, patient resumed Warfarin on Monday, 7/23/2018.  Instructed patient to take 2.5 mg today, 3.75 mg (1 1/2 tablet) tomorrow (8/01), then resume 2.5 mg daily.  Recheck on Tuesday, 8/07/2018.  Mrs. Hinsonz (spouse) repeated back instructions and voiced understanding.

## 2018-08-07 ENCOUNTER — ANTI-COAG VISIT (OUTPATIENT)
Dept: CARDIOLOGY | Facility: CLINIC | Age: 73
End: 2018-08-07

## 2018-08-07 LAB — INR PPP: 2.1

## 2018-08-07 NOTE — PROGRESS NOTES
Fax result taken from _[facility]____.   INR _2.1___ Date drawn_8/07/2018.  Left V/M:  Start 2.5 mg daily.  Recheck on Tuesday, 8/14/2018.

## 2018-08-14 ENCOUNTER — ANTI-COAG VISIT (OUTPATIENT)
Dept: CARDIOLOGY | Facility: CLINIC | Age: 73
End: 2018-08-14
Payer: MEDICARE

## 2018-08-14 LAB — INR PPP: 1.9

## 2018-08-14 PROCEDURE — G0250 MD INR TEST REVIE INTER MGMT: HCPCS | Mod: ,,, | Performed by: INTERNAL MEDICINE

## 2018-08-14 NOTE — PROGRESS NOTES
Fax results taken from _[facility]___.   INR _1.9___ Date drawn_8/14/2018.  Patient contacted:  Reports no missed doses or diet changes.  Instructed to start new dose of 5 mg on Tuesday and 2.5 mg on all other days.  Recheck in 1 week.

## 2018-08-21 ENCOUNTER — ANTI-COAG VISIT (OUTPATIENT)
Dept: CARDIOLOGY | Facility: CLINIC | Age: 73
End: 2018-08-21

## 2018-08-21 LAB — INR PPP: 2.5

## 2018-08-21 NOTE — PROGRESS NOTES
Fax result taken from __[facility]__.   INR _2.5__ Date drawn_8/21/2018.  Patient's spouse contacted:  Patient will continue 5 mg on Tuesday and 2.5 mg on all other days.  Recheck in 1 week.

## 2018-08-28 ENCOUNTER — ANTI-COAG VISIT (OUTPATIENT)
Dept: CARDIOLOGY | Facility: CLINIC | Age: 73
End: 2018-08-28

## 2018-08-28 LAB — INR PPP: 2.6

## 2018-08-28 NOTE — PROGRESS NOTES
Fax result taken from _[facility]__.    INR _2.6___ Date drawn_8/28/2018.  No changes in dose.  Recheck in 1 week.  Please call should you have any questions or concerns at [phone] or [phone].

## 2018-09-04 LAB — INR PPP: 2.8

## 2018-09-05 ENCOUNTER — ANTI-COAG VISIT (OUTPATIENT)
Dept: CARDIOLOGY | Facility: CLINIC | Age: 73
End: 2018-09-05

## 2018-09-05 DIAGNOSIS — Z79.01 LONG TERM (CURRENT) USE OF ANTICOAGULANTS: ICD-10-CM

## 2018-09-05 NOTE — PROGRESS NOTES
Fax result taken from _[facility]___.    INR _2.8___ Date drawn_9/04/2018.  No changes in dose.  Recheck in 1 week.  Please call should you have any questions or concerns at [phone] or [phone].

## 2018-09-07 RX ORDER — WARFARIN 2.5 MG/1
TABLET ORAL
Qty: 102 TABLET | Refills: 0 | Status: CANCELLED | OUTPATIENT
Start: 2018-09-07

## 2018-09-11 ENCOUNTER — ANTI-COAG VISIT (OUTPATIENT)
Dept: CARDIOLOGY | Facility: CLINIC | Age: 73
End: 2018-09-11

## 2018-09-11 LAB — INR PPP: 2.3

## 2018-09-11 NOTE — PROGRESS NOTES
Fax result taken from _[facility]_.   INR _2.3__ Date drawn_Tuesday, 9/11/2018.  Mrs. Pitts contacted:  Reported patient missed x 1 dose on Saturday, 9/08/2018.  Instructed to have patient take 6.25 mg (2 1/2 tablets) today - only, then patient will resume on regular scheduled dose tomorrow, 9/12/2018.  Mrs. Pitts repeated back instructions and voiced understanding.

## 2018-09-18 ENCOUNTER — ANTI-COAG VISIT (OUTPATIENT)
Dept: CARDIOLOGY | Facility: CLINIC | Age: 73
End: 2018-09-18

## 2018-09-18 LAB — INR PPP: 3

## 2018-09-18 NOTE — PROGRESS NOTES
Fax result taken from _[facility]___.    INR __3.0___ Date drawn_9/18/2018.  Maintain 5 mg on Tuesday and 2.5 mg on all other days.  Recheck in 1 week.

## 2018-09-20 ENCOUNTER — OFFICE VISIT (OUTPATIENT)
Dept: CARDIOLOGY | Facility: CLINIC | Age: 73
End: 2018-09-20
Payer: MEDICARE

## 2018-09-20 VITALS
HEART RATE: 76 BPM | DIASTOLIC BLOOD PRESSURE: 82 MMHG | SYSTOLIC BLOOD PRESSURE: 114 MMHG | HEIGHT: 69 IN | WEIGHT: 208.56 LBS | BODY MASS INDEX: 30.89 KG/M2

## 2018-09-20 DIAGNOSIS — E78.5 HYPERLIPIDEMIA, UNSPECIFIED HYPERLIPIDEMIA TYPE: Chronic | ICD-10-CM

## 2018-09-20 DIAGNOSIS — Z95.1 S/P CABG (CORONARY ARTERY BYPASS GRAFT): ICD-10-CM

## 2018-09-20 DIAGNOSIS — I48.3 TYPICAL ATRIAL FLUTTER: ICD-10-CM

## 2018-09-20 DIAGNOSIS — I25.10 CORONARY ARTERY DISEASE INVOLVING NATIVE CORONARY ARTERY OF NATIVE HEART WITHOUT ANGINA PECTORIS: Chronic | ICD-10-CM

## 2018-09-20 DIAGNOSIS — I50.23 ACUTE ON CHRONIC SYSTOLIC CONGESTIVE HEART FAILURE: ICD-10-CM

## 2018-09-20 DIAGNOSIS — Z98.61 S/P PTCA (PERCUTANEOUS TRANSLUMINAL CORONARY ANGIOPLASTY): Primary | ICD-10-CM

## 2018-09-20 DIAGNOSIS — I10 ESSENTIAL HYPERTENSION: ICD-10-CM

## 2018-09-20 DIAGNOSIS — I25.2 OLD MI (MYOCARDIAL INFARCTION): ICD-10-CM

## 2018-09-20 PROCEDURE — 99213 OFFICE O/P EST LOW 20 MIN: CPT | Mod: PBBFAC | Performed by: PHYSICIAN ASSISTANT

## 2018-09-20 PROCEDURE — 99214 OFFICE O/P EST MOD 30 MIN: CPT | Mod: S$PBB,,, | Performed by: PHYSICIAN ASSISTANT

## 2018-09-20 PROCEDURE — 99999 PR PBB SHADOW E&M-EST. PATIENT-LVL III: CPT | Mod: PBBFAC,,, | Performed by: PHYSICIAN ASSISTANT

## 2018-09-20 NOTE — PROGRESS NOTES
Subjective:    Patient ID:  Jethro Pitts is a 73 y.o. male who presents for follow-up of CHF and CAD      HPI   Mr. Pitts is a 73 year old male patient with a PMHx of atrial fibrillation, atrial flutter (previoulsy on Eliquis, now on Coumadin s/p successful PVI and AFL ablation 5/24/16 by Dr. Villafuerte) systolic CHF, cardiomyopathy, CAD s/p CABG, old MI, HTN, and hyperlipidemia who presents today for routine follow-up. Still recovering from neck surgery. Cardiac wise, no complaints. CAD clinically stable. Denies any chest pain, SOB, or other anginal signs or symptoms. CHF appears compensated. No SOB. No PND, orthopnea, abdominal bloating, lower extremity edema, or weight gain. No palpitations, lightheadedness, dizziness, near syncope, or syncope. BP stable and well-controlled on current meds. Patient reports compliance with his medications. Remains on Coumadin. No bleeding issues. Last labs from PCP reviewed. Lipids satisfactory. Recent 2D echo reviewed, showed EF of 45-50%, DD, +WMA.       Review of Systems   Constitution: Negative for chills, decreased appetite, fever, weakness and malaise/fatigue.   HENT: Negative for congestion, hoarse voice and sore throat.    Eyes: Negative for blurred vision and discharge.   Cardiovascular: Negative for chest pain, claudication, cyanosis, dyspnea on exertion, irregular heartbeat, leg swelling, near-syncope, orthopnea, palpitations and paroxysmal nocturnal dyspnea.   Respiratory: Negative for cough, hemoptysis, shortness of breath, snoring, sputum production and wheezing.    Endocrine: Negative for cold intolerance and heat intolerance.   Hematologic/Lymphatic: Negative for bleeding problem. Does not bruise/bleed easily.   Skin: Negative for rash.   Musculoskeletal: Positive for arthritis and neck pain. Negative for back pain, joint pain, joint swelling, muscle cramps, muscle weakness and myalgias.   Gastrointestinal: Negative for abdominal pain, constipation, diarrhea,  "heartburn, melena and nausea.   Genitourinary: Negative for hematuria.   Neurological: Negative for dizziness, focal weakness, headaches, light-headedness, loss of balance, numbness, paresthesias and seizures.   Psychiatric/Behavioral: Negative for memory loss. The patient does not have insomnia.    Allergic/Immunologic: Negative for hives.       /82   Pulse 76   Ht 5' 9" (1.753 m)   Wt 94.6 kg (208 lb 8.9 oz)   BMI 30.80 kg/m²     Objective:    Physical Exam   Constitutional: He is oriented to person, place, and time. He appears well-developed and well-nourished. No distress.   HENT:   Head: Normocephalic and atraumatic.   Eyes: Pupils are equal, round, and reactive to light. Right eye exhibits no discharge. Left eye exhibits no discharge.   Neck: Neck supple. No JVD present. No thyromegaly present.   Cardiovascular: Normal rate, regular rhythm, S1 normal, S2 normal and normal heart sounds.   No murmur heard.  Pulmonary/Chest: Effort normal and breath sounds normal. No respiratory distress. He has no wheezes. He has no rales.   Abdominal: Soft. He exhibits no distension. There is no tenderness. There is no rebound.   Musculoskeletal: He exhibits no edema.   Neurological: He is alert and oriented to person, place, and time.   Skin: Skin is warm and dry. He is not diaphoretic. No erythema.   Psychiatric: He has a normal mood and affect. His behavior is normal. Thought content normal.   Nursing note and vitals reviewed.     2D Echo CONCLUSIONS     1 - Concentric remodeling.     2 - Wall motion abnormalities.     3 - Mildly depressed left ventricular systolic function (EF 45-50%).     4 - Impaired LV relaxation, normal LAP (grade 1 diastolic dysfunction).     5 - Normal right ventricular systolic function .     Impression: ABNORMAL MYOCARDIAL PERFUSION  1. The perfusion scan is free of evidence for myocardial ischemia.   2. There is a moderate size fixed defect of moderate intensity that extends from the base " to the distal inferior wall of the left ventricle, consistent with myocardial injury.   3. There is abnormal wall motion at rest showing akinesis of the inferoseptal wall of the left ventricle.   4. There is resting LV dysfunction with a reduced ejection fraction of 18 %.   5. The left ventricular volume is moderately increased at rest.   6. The extracardiac distribution of radioactivity is normal.   Assessment:       1. S/P PTCA (percutaneous transluminal coronary angioplasty)    2. S/P CABG (coronary artery bypass graft)    3. Old MI (myocardial infarction)    4. Essential hypertension    5. Hyperlipidemia, unspecified hyperlipidemia type    6. Coronary artery disease involving native coronary artery of native heart without angina pectoris    7. Acute on chronic systolic congestive heart failure    8. Typical atrial flutter      Patient doing clinically well. No cardiac complaints. Stable CV wise. Continue current medical mgmt. Needs EP f/u. PCP gets labs  Plan:   -Continue current medical management and risk factor modification  -Cardiac, low salt diet  -Needs EP f/u in February of next year  -RTC 6 months, sooner should issues arise  -Continue Coumadin clinic follow-up    Chart reviewed. Dr. Raza agrees with plan as outlined above.

## 2018-09-21 DIAGNOSIS — Z79.01 LONG TERM (CURRENT) USE OF ANTICOAGULANTS: ICD-10-CM

## 2018-09-21 RX ORDER — WARFARIN 2.5 MG/1
TABLET ORAL
Qty: 90 TABLET | Refills: 3 | Status: SHIPPED | OUTPATIENT
Start: 2018-09-21 | End: 2019-04-30 | Stop reason: SDUPTHER

## 2018-09-25 ENCOUNTER — ANTI-COAG VISIT (OUTPATIENT)
Dept: CARDIOLOGY | Facility: CLINIC | Age: 73
End: 2018-09-25
Payer: MEDICARE

## 2018-09-25 LAB — INR PPP: 2

## 2018-09-25 PROCEDURE — G0250 MD INR TEST REVIE INTER MGMT: HCPCS | Mod: ,,, | Performed by: INTERNAL MEDICINE

## 2018-09-25 NOTE — PROGRESS NOTES
Fax result taken from _[facility]___.    INR _2.0__ Date drawn_9/25/2018.  Patient contacted - spoke with Giovany Hinsonz:  Patient had an intake of avocado (medium Vitamin K food item).  Instructed to take 6.25 mg today - only, then resume on 2.5 mg daily.  Recheck in 1 week.  Mrs. Pitts repeated back instructions and voiced understanding.

## 2018-10-02 ENCOUNTER — ANTI-COAG VISIT (OUTPATIENT)
Dept: CARDIOLOGY | Facility: CLINIC | Age: 73
End: 2018-10-02

## 2018-10-02 LAB — INR PPP: 2.1

## 2018-10-02 NOTE — PROGRESS NOTES
Fax result taken from __[facility]___.   INR _2.1__ Date drawn_10/02/2018.  Patient contacted:  Reported missed doses or diet changes.  Instructed to take 5 mg on Tuesday and Thursday; and 2.5 mg on all other days.  Recheck in 1 week.  Patient repeated back instructions and voiced understanding.

## 2018-10-09 ENCOUNTER — ANTI-COAG VISIT (OUTPATIENT)
Dept: CARDIOLOGY | Facility: CLINIC | Age: 73
End: 2018-10-09

## 2018-10-09 LAB — INR PPP: 4

## 2018-10-09 NOTE — PROGRESS NOTES
Fax result taken from _[facility]__.   INR  4.0__ Date drawn_10/09/2018.__   Patient contacted - spoke with Mrs. Pitts:  Reported no medication changes//no signs of any abnormal bleeding at present.  Instructed to hold Warfarin dose today - only, then resume on regular scheduled dose of 5 mg on Tuesday and Thursday; and 2.5 mg on all other days.  Recheck in 1 week.  Mrs. Pitts repeated back instructions and voiced understanding.

## 2018-10-16 ENCOUNTER — ANTI-COAG VISIT (OUTPATIENT)
Dept: CARDIOLOGY | Facility: CLINIC | Age: 73
End: 2018-10-16

## 2018-10-16 LAB — INR PPP: 2.6

## 2018-10-16 NOTE — PROGRESS NOTES
Fax result taken from _[facility]__.   INR _2.6__ Date drawn_10/16/2018.  Mrs. Pitts contacted:  Instructed to have patient maintain 5 mg on Tuesday and Thursday; and 2.5 mg on all other days.  Recheck in 1 week.  Mrs. Pitts voiced understanding.

## 2018-10-23 ENCOUNTER — ANTI-COAG VISIT (OUTPATIENT)
Dept: CARDIOLOGY | Facility: CLINIC | Age: 73
End: 2018-10-23

## 2018-10-23 LAB — INR PPP: 4.2

## 2018-10-23 NOTE — PROGRESS NOTES
Fax result taken from [facility].   INR  4.2.   Date drawn 10/23/2018.   Patient contacted at mobile number, spoke with Mrs. Pitts, verified patient identifiers.  Reported patient has had an influenza vaccine within the past week; also has discontinued fish oil supplement.  Educated wife on interaction between the vaccine and warfarin; will monitor closely for any effects of fish oil discontinuation.  No signs of abnormal bleeding noted, advised wife if patient does experience any abnormal bleeding, he should seek immediate medical attention.  Instructed to hold warfarin dose today, take 1.25mg on tomorrow (10/24); then resume normal dose of 5 mg on Tuesday/Thursday and 2.5 mg on all other days. Mrs. Pitts repeated back instructions and voiced understanding.  Recheck in 1 week.

## 2018-10-30 ENCOUNTER — ANTI-COAG VISIT (OUTPATIENT)
Dept: CARDIOLOGY | Facility: CLINIC | Age: 73
End: 2018-10-30
Payer: MEDICARE

## 2018-10-30 LAB — INR PPP: 3.7

## 2018-10-30 PROCEDURE — G0250 MD INR TEST REVIE INTER MGMT: HCPCS | Mod: ,,, | Performed by: INTERNAL MEDICINE

## 2018-10-30 NOTE — PROGRESS NOTES
Fax result taken from [facility].   INR  3.7.   Date drawn 10/30/2018.   Patient contacted at mobile number, spoke with Mrs. Pitts, verified patient identifiers. No significant changes reported.  No signs of bleeding noted, advised wife to have patient seek immediate medical attention if Mr. Pitts does experience any abnormal bleeding. Instructed wife to have patient decrease dose to 5mg on Thursdays and 2.5mg on all other days of the week.  Caregiver repeated instructions. Recheck in 1 week.

## 2018-11-08 ENCOUNTER — TELEPHONE (OUTPATIENT)
Dept: CARDIOLOGY | Facility: CLINIC | Age: 73
End: 2018-11-08

## 2018-11-08 NOTE — TELEPHONE ENCOUNTER
Patient contacted in regards to overdue INR results.  Spoke with wife, verified patient identifies.    Wife spoke to coumadin clinic earlier this week regarding recalled strips and the need to have INR testing done through a local lab until new unaffected strips are received.      Mrs. Pitts contacted [facility] and was informed new strips would not be mailed out until 12/04/2018.    Wife stated they will be in [place] until after the Thanksgiving holiday.  She has attempted to go to [facility] in the past but received a bill for PT/INR testing.  Mr. Pitts prefers not to go to [facility] due to billing issues.      Mrs. Pitts will contact Medicare provider to determine what lab is covered through their plan.    Reiterated the importance of having INR testing done soon.     Mrs. Pitts voiced understanding, will contact clinic with update.

## 2018-12-04 ENCOUNTER — TELEPHONE (OUTPATIENT)
Dept: CARDIOLOGY | Facility: CLINIC | Age: 73
End: 2018-12-04

## 2018-12-04 ENCOUNTER — ANTI-COAG VISIT (OUTPATIENT)
Dept: CARDIOLOGY | Facility: CLINIC | Age: 73
End: 2018-12-04

## 2018-12-04 LAB — INR PPP: 3

## 2018-12-04 NOTE — TELEPHONE ENCOUNTER
----- Message from Sheri Kim PharmD sent at 12/4/2018 10:56 AM CST -----  Good Morning,    [facility] has Mr. Pitts's  INR goal listed as 2.5-3.5; according to his anticoagulation episode his goal should be 2.5-3.0.    I attempted to contact [facility] regarding updating his goal.  Dr. Raza is required to complete documentation before changes are made, the required documents will be faxed to your office.    Thanks in advance,    Sheri Kim, PharmD

## 2018-12-04 NOTE — PROGRESS NOTES
Fax result taken from _[facility]__.     INR _3.0___ Date drawn_12/04/2018.  Patient will maintain current dose of Warfarin 5 mg every Thursday and 2.5 mg on all other days.  Recheck in 1 week.

## 2018-12-11 ENCOUNTER — ANTI-COAG VISIT (OUTPATIENT)
Dept: CARDIOLOGY | Facility: CLINIC | Age: 73
End: 2018-12-11

## 2018-12-11 LAB — INR PPP: 3

## 2018-12-11 NOTE — PROGRESS NOTES
Fax result taken from _[facility]__.   INR _3.0__ Date drawn_12/11/2018.  Patient contacted:  Reports taking medication for Shingles.  Medication reviewed.  Will maintain current dose of Warfarin 5 mg every Thursday and 2.5 mg on all other days.  Recheck in 1 week.

## 2018-12-18 ENCOUNTER — ANTI-COAG VISIT (OUTPATIENT)
Dept: CARDIOLOGY | Facility: CLINIC | Age: 73
End: 2018-12-18
Payer: MEDICARE

## 2018-12-18 LAB — INR PPP: 4.3

## 2018-12-18 PROCEDURE — G0250 MD INR TEST REVIE INTER MGMT: HCPCS | Mod: ,,,

## 2018-12-18 NOTE — PROGRESS NOTES
Fax result received from [facility].   INR 4.3.  Date drawn 12/18/2018. Wife contacted: No significant changes or extra doses reported.  No signs of bleeding noted.  Instructions given for patient to hold dose of coumadin on today; then resume current dose of 5 mg every Thursday and 2.5 mg on all other days.  Recheck in 1 week.

## 2018-12-28 ENCOUNTER — ANTI-COAG VISIT (OUTPATIENT)
Dept: CARDIOLOGY | Facility: CLINIC | Age: 73
End: 2018-12-28

## 2018-12-28 LAB — INR PPP: 2.7

## 2018-12-28 NOTE — PROGRESS NOTES
Fax result received from [facility].   INR 2.7.  Date drawn 12/28/2018. Patient will continue current dose of coumadin 5 mg every Thursday and 2.5 mg on all other days.  Recheck in 1 week.

## 2019-01-08 ENCOUNTER — ANTI-COAG VISIT (OUTPATIENT)
Dept: CARDIOLOGY | Facility: CLINIC | Age: 74
End: 2019-01-08

## 2019-01-08 LAB — INR PPP: 3.2

## 2019-01-08 NOTE — PROGRESS NOTES
Fax result taken from _[facility]_.   INR _3.2__ Date drawn_1/08/2019.  Patient contacted:  Report no recent changes.  No signs of any abnormal bleeding reported.  Instructed to take a lower dose of Warfarin 1.25 mg today - only, then resume on regular scheduled 5 mg every Thursday and 2.5 mg on all other days - will rechallenge.  Recheck in 1 week.  Patient voiced understanding.

## 2019-01-15 ENCOUNTER — ANTI-COAG VISIT (OUTPATIENT)
Dept: CARDIOLOGY | Facility: CLINIC | Age: 74
End: 2019-01-15

## 2019-01-15 LAB — INR PPP: 3.2

## 2019-01-15 NOTE — PROGRESS NOTES
Fax result taken from _[facility]___.   INR _3.2___ Date drawn_1/15/2019.  Patient contacted:  Left V/M.  Will lower dose of Warfarin to 3.75 mg on Thursday and 2.5 mg on all other days per week.  Recheck in 1 week.  Call Coumadin Clinic at [phone], if you should have any questions or concerns.

## 2019-01-22 ENCOUNTER — ANTI-COAG VISIT (OUTPATIENT)
Dept: CARDIOLOGY | Facility: CLINIC | Age: 74
End: 2019-01-22
Payer: MEDICARE

## 2019-01-22 LAB — INR PPP: 3.5

## 2019-01-22 PROCEDURE — G0250 MD INR TEST REVIE INTER MGMT: HCPCS | Mod: ,,, | Performed by: INTERNAL MEDICINE

## 2019-01-22 PROCEDURE — G0250 PR MD REVIEW INTERPRET OF TEST: ICD-10-PCS | Mod: ,,, | Performed by: INTERNAL MEDICINE

## 2019-01-22 NOTE — PROGRESS NOTES
Fax result taken from _[facility]___.     INR __3.5___ Date drawn_1/22/2019.  Patient contacted - spoke with Mrs. Pitts:  reports patient had no recent medication changes.  No diet changes.  No signs of any abnormal bleeding at the time of call.  Will lower today's (Tuesday) dose to 1.25 mg, then patient will start 2.5 mg every evening.  Recheck in 1 week.  Advised to seek medical attention (ED) for any signs of abnormal bleeding, if needed.  Giovany Pitts repeated back instructions and voiced understanding.

## 2019-01-29 ENCOUNTER — ANTI-COAG VISIT (OUTPATIENT)
Dept: CARDIOLOGY | Facility: CLINIC | Age: 74
End: 2019-01-29

## 2019-01-29 LAB — INR PPP: 2.6

## 2019-01-29 NOTE — PROGRESS NOTES
Fax result taken from _[facility]___.    INR _2.6__ Date drawn_1/28/2019.  Patient contacted:  Will maintain Warfarin 2.5 mg every evening.  Recheck in 1 week.  Patient voiced understanding.

## 2019-02-05 ENCOUNTER — ANTI-COAG VISIT (OUTPATIENT)
Dept: CARDIOLOGY | Facility: CLINIC | Age: 74
End: 2019-02-05

## 2019-02-05 LAB — INR PPP: 2.8

## 2019-02-05 NOTE — PROGRESS NOTES
Fax result taken from _[facility]__.   INR _2.8__ Date drawn_2/05/2019.__   Will maintain current dose of Warfarin 2.5 mg every evening.  Patient will recheck in 1 week.

## 2019-02-12 ENCOUNTER — ANTI-COAG VISIT (OUTPATIENT)
Dept: CARDIOLOGY | Facility: CLINIC | Age: 74
End: 2019-02-12

## 2019-02-12 LAB — INR PPP: 2.3

## 2019-02-12 NOTE — PROGRESS NOTES
Fax result received from [facility].   INR 2.3.   Date drawn 2/12/2019.  Patient contacted: No significant changes or missed doses reported.  Instructions given to take coumadin 3.75mg on today; then resume current dose of 2.5mg every evening.  Recheck in 1 week.

## 2019-02-19 ENCOUNTER — ANTI-COAG VISIT (OUTPATIENT)
Dept: CARDIOLOGY | Facility: CLINIC | Age: 74
End: 2019-02-19

## 2019-02-19 LAB — INR PPP: 1.7

## 2019-02-19 NOTE — PROGRESS NOTES
Fax result received from [facility].   INR 1.7.   Date drawn 2/19/2019.  Patient contacted: Currently taking OTC melatonin and vitamin B-12 (no known interactions).  Instructions given for patient to increase dose of coumadin to 3.75mg on Tuesdays and Thursdays; and 2.5mg on all other days.  Recheck in 1 week.

## 2019-02-26 ENCOUNTER — ANTI-COAG VISIT (OUTPATIENT)
Dept: CARDIOLOGY | Facility: CLINIC | Age: 74
End: 2019-02-26
Payer: MEDICARE

## 2019-02-26 LAB — INR PPP: 2.5

## 2019-02-26 PROCEDURE — G0250 MD INR TEST REVIE INTER MGMT: HCPCS | Mod: ,,, | Performed by: INTERNAL MEDICINE

## 2019-02-26 PROCEDURE — G0250 PR MD REVIEW INTERPRET OF TEST: ICD-10-PCS | Mod: ,,, | Performed by: INTERNAL MEDICINE

## 2019-02-26 NOTE — PROGRESS NOTES
Fax result taken from __[facility]__.    INR _2.5__ Date drawn_2/26/2019_.   Will maintain current dose of Warfarin 3.75 mg every Tuesday and Thursday; and 2.5 mg on all other days per week.  Patient will recheck in 1 week.

## 2019-03-05 LAB — INR PPP: 2.4

## 2019-03-06 ENCOUNTER — ANTI-COAG VISIT (OUTPATIENT)
Dept: CARDIOLOGY | Facility: CLINIC | Age: 74
End: 2019-03-06

## 2019-03-06 NOTE — PROGRESS NOTES
Fax result taken from _[facility]__.    INR __2.4__ Date drawn_3/05/2019_.  Trended down from goal.  Patient contacted:  Reports taking 2.5 mg on Thursday, 2/28/2019, instead of 3.75 mg.  Will boost today's (Wednesday) dose to 3.75 mg - only, then patient will resume on regular scheduled dose of Warfarin 3.75 mg every Tuesday and Thursday; and 2.5 mg on all other days per week.  Recheck in 1 week.  Patient voiced understanding.

## 2019-03-11 ENCOUNTER — OFFICE VISIT (OUTPATIENT)
Dept: CARDIOLOGY | Facility: CLINIC | Age: 74
End: 2019-03-11
Payer: MEDICARE

## 2019-03-11 VITALS
WEIGHT: 211.19 LBS | BODY MASS INDEX: 31.28 KG/M2 | OXYGEN SATURATION: 98 % | DIASTOLIC BLOOD PRESSURE: 90 MMHG | HEART RATE: 88 BPM | HEIGHT: 69 IN | SYSTOLIC BLOOD PRESSURE: 138 MMHG

## 2019-03-11 DIAGNOSIS — R93.1 ABNORMAL ECHOCARDIOGRAM: ICD-10-CM

## 2019-03-11 DIAGNOSIS — Z98.61 S/P PTCA (PERCUTANEOUS TRANSLUMINAL CORONARY ANGIOPLASTY): ICD-10-CM

## 2019-03-11 DIAGNOSIS — Z95.1 S/P CABG (CORONARY ARTERY BYPASS GRAFT): ICD-10-CM

## 2019-03-11 DIAGNOSIS — I25.10 CORONARY ARTERY DISEASE INVOLVING NATIVE CORONARY ARTERY OF NATIVE HEART WITHOUT ANGINA PECTORIS: Primary | Chronic | ICD-10-CM

## 2019-03-11 DIAGNOSIS — I25.2 OLD MI (MYOCARDIAL INFARCTION): ICD-10-CM

## 2019-03-11 DIAGNOSIS — I10 ESSENTIAL HYPERTENSION: ICD-10-CM

## 2019-03-11 DIAGNOSIS — E78.5 HYPERLIPIDEMIA, UNSPECIFIED HYPERLIPIDEMIA TYPE: Chronic | ICD-10-CM

## 2019-03-11 PROCEDURE — 99214 OFFICE O/P EST MOD 30 MIN: CPT | Mod: PBBFAC | Performed by: PHYSICIAN ASSISTANT

## 2019-03-11 PROCEDURE — 99214 OFFICE O/P EST MOD 30 MIN: CPT | Mod: S$PBB,,, | Performed by: PHYSICIAN ASSISTANT

## 2019-03-11 PROCEDURE — 99214 PR OFFICE/OUTPT VISIT, EST, LEVL IV, 30-39 MIN: ICD-10-PCS | Mod: S$PBB,,, | Performed by: PHYSICIAN ASSISTANT

## 2019-03-11 PROCEDURE — 99999 PR PBB SHADOW E&M-EST. PATIENT-LVL IV: ICD-10-PCS | Mod: PBBFAC,,, | Performed by: PHYSICIAN ASSISTANT

## 2019-03-11 PROCEDURE — 99999 PR PBB SHADOW E&M-EST. PATIENT-LVL IV: CPT | Mod: PBBFAC,,, | Performed by: PHYSICIAN ASSISTANT

## 2019-03-11 NOTE — PROGRESS NOTES
Subjective:    Patient ID:  Jethro Pitts is a 73 y.o. male who presents for follow-up of Coronary Artery Disease; Congestive Heart Failure; Hyperlipidemia; and Atrial Fibrillation      HPI  Mr. Pitts is a 73 year old male patient whose current medical conditions include atrial fibrillation, atrial flutter (previoulsy on Eliquis, now on Coumadin s/p successful PVI and AFL ablation 5/24/16 by Dr. Villafuerte) systolic CHF, cardiomyopathy, CAD s/p CABG, old MI, HTN, and hyperlipidemia who presents today for routine follow-up. He returns today and states he is feeling well. No cardiac complaints. CAD clinically stable. No chest pain, SOB, or tightness. CHF clinically compensated. No PND, orthopnea, lower extremity edema, weight gain, or abdominal bloating. No palpitations, lightheadedness, dizziness, near syncope, or syncope. BP elevated today which is unusual for patient. Patient reports compliance with his medications. Trying to watch his salt intake. Labs scheduled at next visit with PCP.      Review of Systems   Constitution: Negative for chills, decreased appetite, fever, weakness and malaise/fatigue.   HENT: Negative for congestion, hoarse voice and sore throat.    Eyes: Negative for blurred vision and discharge.   Cardiovascular: Negative for chest pain, claudication, cyanosis, dyspnea on exertion, irregular heartbeat, leg swelling, near-syncope, orthopnea, palpitations and paroxysmal nocturnal dyspnea.   Respiratory: Negative for cough, hemoptysis, shortness of breath, snoring, sputum production and wheezing.    Endocrine: Negative for cold intolerance and heat intolerance.   Hematologic/Lymphatic: Negative for bleeding problem. Does not bruise/bleed easily.   Skin: Negative for rash.   Musculoskeletal: Negative for arthritis, back pain, joint pain, joint swelling, muscle cramps, muscle weakness and myalgias.   Gastrointestinal: Negative for abdominal pain, constipation, diarrhea, heartburn, melena and nausea.  "  Genitourinary: Negative for hematuria.   Neurological: Negative for dizziness, focal weakness, headaches, light-headedness, loss of balance, numbness, paresthesias and seizures.   Psychiatric/Behavioral: Negative for memory loss. The patient does not have insomnia.    Allergic/Immunologic: Negative for hives.       BP (!) 138/90   Pulse 88   Ht 5' 9" (1.753 m)   Wt 95.8 kg (211 lb 3.2 oz)   SpO2 98%   BMI 31.19 kg/m²     Objective:    Physical Exam   Constitutional: He is oriented to person, place, and time. He appears well-developed and well-nourished. No distress.   HENT:   Head: Normocephalic and atraumatic.   Eyes: Pupils are equal, round, and reactive to light. Right eye exhibits no discharge. Left eye exhibits no discharge.   Neck: Neck supple. No JVD present.   Cardiovascular: Normal rate, regular rhythm, S1 normal, S2 normal and normal heart sounds.   No murmur heard.  Pulmonary/Chest: Effort normal and breath sounds normal. No respiratory distress. He has no wheezes. He has no rales.   Abdominal: Soft. He exhibits no distension. There is no tenderness. There is no rebound.   Musculoskeletal: He exhibits no edema.   Neurological: He is alert and oriented to person, place, and time.   Skin: Skin is warm and dry. He is not diaphoretic. No erythema.   Psychiatric: He has a normal mood and affect. His behavior is normal. Thought content normal.   Nursing note and vitals reviewed.    2D Echo CONCLUSIONS     1 - Concentric remodeling.     2 - Wall motion abnormalities.     3 - Mildly depressed left ventricular systolic function (EF 45-50%).     4 - Impaired LV relaxation, normal LAP (grade 1 diastolic dysfunction).     5 - Normal right ventricular systolic function .   Assessment:       1. Coronary artery disease involving native coronary artery of native heart without angina pectoris    2. Old MI (myocardial infarction)    3. Essential hypertension    4. Hyperlipidemia, unspecified hyperlipidemia type  "   5. S/P CABG (coronary artery bypass graft)    6. S/P PTCA (percutaneous transluminal coronary angioplasty)      Patient who presents for f/u. Doing clinically well. No angina or equivalent. CHF clinically compensated. BP elevated which is unusual for patient. Will have him monitor and send me log within next two weeks and consider adding ARB at that time. Patient previously on Lisinopril in the past which was d/c'd due to hypotension. Needs ischemic evaluation with repeat stress test as he has had no ischemic evaluation since 2016. Schedule 2D echo same day.  Plan:   -Log BP, send log within 2 weeks  -Will consider adding ARB  -Continue same meds  -Monitors Coumadin level at home with [facility] monitoring system  -RTC 6 months with 2D echo, MPI stress, test, EKG  -PCP gets labs    Chart reviewed. Dr. Raza agrees with plan as outlined above.     RTC 6 months with 2D echo and MPI stress test

## 2019-03-12 ENCOUNTER — ANTI-COAG VISIT (OUTPATIENT)
Dept: CARDIOLOGY | Facility: CLINIC | Age: 74
End: 2019-03-12

## 2019-03-12 LAB — INR PPP: 3.3

## 2019-03-12 NOTE — PROGRESS NOTES
Fax result received from [facility].   INR 3.3.   Date drawn 3/12/2019.  Patient/Wife contacted: No significant changes or missed doses reported.  Instructions given for patient to take coumadin 2.5mg on today; then resume current dose of 3.75mg on Tuesdays and Thursdays; and 2.5mg on all other days.  Recheck in 1 week.

## 2019-03-15 ENCOUNTER — TELEPHONE (OUTPATIENT)
Dept: CARDIOLOGY | Facility: CLINIC | Age: 74
End: 2019-03-15

## 2019-03-15 NOTE — TELEPHONE ENCOUNTER
Please phone patient. He needs to start taking ASA 81 mg daily in addition to his current meds.    Thanks

## 2019-03-19 ENCOUNTER — ANTI-COAG VISIT (OUTPATIENT)
Dept: CARDIOLOGY | Facility: CLINIC | Age: 74
End: 2019-03-19

## 2019-03-19 LAB — INR PPP: 3.8

## 2019-03-19 NOTE — PROGRESS NOTES
Fax result received from [facility].   INR 3.8.   Date drawn 3/19/2019.  Patient/Wife contacted: No significant changes or missed doses reported; patient drinks alcohol but has been consistent.  Instructions given for patient to decrease coumadin dose to 3.75mg on Thursdays; and 2.5mg on all other days.  Recheck in 1 week.

## 2019-03-20 ENCOUNTER — PATIENT MESSAGE (OUTPATIENT)
Dept: CARDIOLOGY | Facility: CLINIC | Age: 74
End: 2019-03-20

## 2019-03-26 ENCOUNTER — ANTI-COAG VISIT (OUTPATIENT)
Dept: CARDIOLOGY | Facility: CLINIC | Age: 74
End: 2019-03-26
Payer: MEDICARE

## 2019-03-26 LAB — INR PPP: 2.5

## 2019-03-26 PROCEDURE — G0250 MD INR TEST REVIE INTER MGMT: HCPCS | Mod: ,,, | Performed by: INTERNAL MEDICINE

## 2019-03-26 PROCEDURE — G0250 PR MD REVIEW INTERPRET OF TEST: ICD-10-PCS | Mod: ,,, | Performed by: INTERNAL MEDICINE

## 2019-03-26 NOTE — PROGRESS NOTES
Fax result taken from __[facility]__.   INR _2.5_ Date drawn_3/26/2019_.  Patient contacted:  Will maintain current dose of Warfarin 3.75 mg every Thursday and 2.5 mg on all other days per week.  Recheck in 1 week.  Patient repeated back instructions and voiced understanding.

## 2019-04-02 ENCOUNTER — ANTI-COAG VISIT (OUTPATIENT)
Dept: CARDIOLOGY | Facility: CLINIC | Age: 74
End: 2019-04-02

## 2019-04-02 LAB — INR PPP: 2.1

## 2019-04-02 NOTE — PROGRESS NOTES
Fax result received from [facility].   INR 2.1.   Date drawn 04/02/2019.  Patient/Wife contacted: No significant changes or missed doses reported.  Instructions given for patient to take coumadin 3.75mg on today; then resume current dose of 3.75mg on Thursdays; and 2.5mg on all other days.  Wife voiced understanding.  Recheck in 1 week.

## 2019-04-08 ENCOUNTER — PATIENT MESSAGE (OUTPATIENT)
Dept: CARDIOLOGY | Facility: CLINIC | Age: 74
End: 2019-04-08

## 2019-04-09 ENCOUNTER — ANTI-COAG VISIT (OUTPATIENT)
Dept: CARDIOLOGY | Facility: CLINIC | Age: 74
End: 2019-04-09

## 2019-04-09 LAB — INR PPP: 2.2

## 2019-04-09 NOTE — PROGRESS NOTES
Fax result received from [facility].   INR 2.2.   Date drawn 4/09/2019.  Patient/Wife contacted: No significant changes or missed doses reported.  Instructions given for patient to increase coumadin dose to 3.75mg on Tuesdays, Thursdays, Saturdays; and 2.5mg on all other days.  Recheck in 1 week.

## 2019-04-16 ENCOUNTER — ANTI-COAG VISIT (OUTPATIENT)
Dept: CARDIOLOGY | Facility: CLINIC | Age: 74
End: 2019-04-16
Payer: MEDICARE

## 2019-04-16 LAB — INR PPP: 2.7

## 2019-04-16 PROCEDURE — G0250 PR MD REVIEW INTERPRET OF TEST: ICD-10-PCS | Mod: ,,, | Performed by: INTERNAL MEDICINE

## 2019-04-16 PROCEDURE — G0250 MD INR TEST REVIE INTER MGMT: HCPCS | Mod: ,,, | Performed by: INTERNAL MEDICINE

## 2019-04-16 NOTE — PROGRESS NOTES
Fax result taken from _[email or web address]__.    INR _2.7__ Date drawn_4/16/2019_.  Patient contacted - Left V/M at [phone] (M)  :  To maintain current dose of Warfarin 3.75 mg every Tuesday, Thursday, and Saturday; and 2.5 mg on all other days per week.  Recheck in 1 week.  Any questions or concerns, contact the Coumadin Clinic at [phone].

## 2019-04-23 LAB — INR PPP: 3.3

## 2019-04-24 ENCOUNTER — ANTI-COAG VISIT (OUTPATIENT)
Dept: CARDIOLOGY | Facility: CLINIC | Age: 74
End: 2019-04-24
Payer: MEDICARE

## 2019-04-24 DIAGNOSIS — Z79.01 LONG TERM (CURRENT) USE OF ANTICOAGULANTS: ICD-10-CM

## 2019-04-24 PROCEDURE — G0250 PR MD REVIEW INTERPRET OF TEST: ICD-10-PCS | Mod: ,,, | Performed by: INTERNAL MEDICINE

## 2019-04-24 PROCEDURE — G0250 MD INR TEST REVIE INTER MGMT: HCPCS | Mod: ,,, | Performed by: INTERNAL MEDICINE

## 2019-04-24 NOTE — PROGRESS NOTES
Fax result received from [facility].   INR 3.3.   Date drawn 4/23/2019.  Patient/Wife contacted: No significant changes or missed doses reported.  Wife insists patient has been consistent with diet/lifestyle.  Patient avoids all greens, has a beer at lunch and a glass of wine for dinner.  Instructions given for patient to take coumadin 1.25mg on today; then resume current dose of 3.75mg on Tuesdays, Thursdays, Saturdays; and 2.5mg on all other days.  Recheck in 1 week. Wife repeated directions and voiced understanding.

## 2019-04-30 ENCOUNTER — ANTI-COAG VISIT (OUTPATIENT)
Dept: CARDIOLOGY | Facility: CLINIC | Age: 74
End: 2019-04-30
Payer: MEDICARE

## 2019-04-30 DIAGNOSIS — Z79.01 LONG TERM (CURRENT) USE OF ANTICOAGULANTS: ICD-10-CM

## 2019-04-30 LAB — INR PPP: 2.9

## 2019-04-30 PROCEDURE — G0250 MD INR TEST REVIE INTER MGMT: HCPCS | Mod: ,,, | Performed by: INTERNAL MEDICINE

## 2019-04-30 PROCEDURE — G0250 PR MD REVIEW INTERPRET OF TEST: ICD-10-PCS | Mod: ,,, | Performed by: INTERNAL MEDICINE

## 2019-04-30 RX ORDER — WARFARIN 2.5 MG/1
TABLET ORAL
Qty: 104 TABLET | Refills: 3 | Status: ON HOLD | OUTPATIENT
Start: 2019-04-30 | End: 2019-10-23 | Stop reason: SDUPTHER

## 2019-04-30 NOTE — PROGRESS NOTES
Fax result taken from __[facility]__.    INR _2.9__ Date drawn_4/30/3019_.  Patient contacted - left V/M at [phone]:  Maintain current dose of Warfarin 3.75 mg every Tuesday, Thursday, and Saturday; and 2.5 mg on all other days per week.  Recheck INR in 1 week.  Any questions or concerns, please contact the Coumadin Clinic at [phone].

## 2019-05-07 ENCOUNTER — ANTI-COAG VISIT (OUTPATIENT)
Dept: CARDIOLOGY | Facility: CLINIC | Age: 74
End: 2019-05-07
Payer: MEDICARE

## 2019-05-07 DIAGNOSIS — Z79.01 LONG TERM (CURRENT) USE OF ANTICOAGULANTS: ICD-10-CM

## 2019-05-07 LAB — INR PPP: 4.2

## 2019-05-07 PROCEDURE — G0250 MD INR TEST REVIE INTER MGMT: HCPCS | Mod: ,,, | Performed by: INTERNAL MEDICINE

## 2019-05-07 PROCEDURE — G0250 PR MD REVIEW INTERPRET OF TEST: ICD-10-PCS | Mod: ,,, | Performed by: INTERNAL MEDICINE

## 2019-05-07 NOTE — PROGRESS NOTES
Fax result received from [facility].   INR 4.2.   Date drawn 5/07/2019.  Patient/Wife contacted: No significant changes or missed doses reported.  Wife states patient has been consistent with diet and alcohol consumption. Instructions given for patient to decrease dose of coumadin to 3.75mg on Saturdays; and 2.5mg on all other days.  Recheck in 1 week. Wife repeated directions and voiced understanding.

## 2019-05-14 ENCOUNTER — ANTI-COAG VISIT (OUTPATIENT)
Dept: CARDIOLOGY | Facility: CLINIC | Age: 74
End: 2019-05-14
Payer: MEDICARE

## 2019-05-14 DIAGNOSIS — Z79.01 LONG TERM (CURRENT) USE OF ANTICOAGULANTS: ICD-10-CM

## 2019-05-14 DIAGNOSIS — I48.91 ATRIAL FIBRILLATION WITH RVR: ICD-10-CM

## 2019-05-14 LAB — INR PPP: 2.7

## 2019-05-14 PROCEDURE — G0250 PR MD REVIEW INTERPRET OF TEST: ICD-10-PCS | Mod: ,,, | Performed by: INTERNAL MEDICINE

## 2019-05-14 PROCEDURE — G0250 MD INR TEST REVIE INTER MGMT: HCPCS | Mod: ,,, | Performed by: INTERNAL MEDICINE

## 2019-05-22 ENCOUNTER — ANTI-COAG VISIT (OUTPATIENT)
Dept: CARDIOLOGY | Facility: CLINIC | Age: 74
End: 2019-05-22
Payer: MEDICARE

## 2019-05-22 DIAGNOSIS — I48.91 ATRIAL FIBRILLATION WITH RVR: ICD-10-CM

## 2019-05-22 DIAGNOSIS — Z79.01 LONG TERM (CURRENT) USE OF ANTICOAGULANTS: ICD-10-CM

## 2019-05-22 DIAGNOSIS — I48.3 TYPICAL ATRIAL FLUTTER: ICD-10-CM

## 2019-05-22 LAB — INR PPP: 2.5

## 2019-05-22 PROCEDURE — G0250 MD INR TEST REVIE INTER MGMT: HCPCS | Mod: ,,, | Performed by: INTERNAL MEDICINE

## 2019-05-22 PROCEDURE — G0250 PR MD REVIEW INTERPRET OF TEST: ICD-10-PCS | Mod: ,,, | Performed by: INTERNAL MEDICINE

## 2019-05-22 NOTE — PROGRESS NOTES
Fax result taken from __[facility] [facility]__.    INR _2.5_(therapeutic)_ Date drawn_5/22/2019_.  Will maintain current dose of Warfarin 2.5 mg Sunday through Friday and 3.75 mg every Saturday.  Patient will recheck in 1 week.

## 2019-05-28 ENCOUNTER — ANTI-COAG VISIT (OUTPATIENT)
Dept: CARDIOLOGY | Facility: CLINIC | Age: 74
End: 2019-05-28
Payer: MEDICARE

## 2019-05-28 DIAGNOSIS — I48.91 ATRIAL FIBRILLATION WITH RVR: ICD-10-CM

## 2019-05-28 DIAGNOSIS — Z79.01 LONG TERM (CURRENT) USE OF ANTICOAGULANTS: ICD-10-CM

## 2019-05-28 LAB — INR PPP: 2.4

## 2019-05-28 PROCEDURE — G0250 MD INR TEST REVIE INTER MGMT: HCPCS | Mod: ,,, | Performed by: INTERNAL MEDICINE

## 2019-05-28 PROCEDURE — G0250 PR MD REVIEW INTERPRET OF TEST: ICD-10-PCS | Mod: ,,, | Performed by: INTERNAL MEDICINE

## 2019-05-28 NOTE — PROGRESS NOTES
Fax result received from [email or web address].   INR 2.4.   Date drawn 5/28/2019.  Patient/Wife contacted: No significant changes or missed doses reported.  Advised caregiver of importance of consistent vitamin K intake and alcohol consumption.   Will re-challenge current dose. Instructions given for patient to continue current dose of coumadin 3.75mg on Saturdays; and 2.5mg on all other days.  Recheck in 1 week. Wife repeated directions and voiced understanding

## 2019-06-04 ENCOUNTER — ANTI-COAG VISIT (OUTPATIENT)
Dept: CARDIOLOGY | Facility: CLINIC | Age: 74
End: 2019-06-04
Payer: MEDICARE

## 2019-06-04 DIAGNOSIS — Z79.01 LONG TERM (CURRENT) USE OF ANTICOAGULANTS: ICD-10-CM

## 2019-06-04 DIAGNOSIS — I48.19 PERSISTENT ATRIAL FIBRILLATION: ICD-10-CM

## 2019-06-04 LAB — INR PPP: 2.6

## 2019-06-04 PROCEDURE — 93793 PR ANTICOAGULANT MGMT FOR PT TAKING WARFARIN: ICD-10-PCS | Mod: ,,,

## 2019-06-04 PROCEDURE — 93793 ANTICOAG MGMT PT WARFARIN: CPT | Mod: ,,,

## 2019-06-04 NOTE — PROGRESS NOTES
Fax result taken from _[facility]_.    INR _2.6 (therapeutic)__ Date drawn_6/04/2019_.  Will maintain current dose of Warfarin 3.75 mg every Saturday and 2.5 mg Sunday through Friday.  Patient will recheck INR in 1 week.

## 2019-06-11 LAB — INR PPP: 2.5

## 2019-06-12 ENCOUNTER — ANTI-COAG VISIT (OUTPATIENT)
Dept: CARDIOLOGY | Facility: CLINIC | Age: 74
End: 2019-06-12
Payer: MEDICARE

## 2019-06-12 DIAGNOSIS — Z79.01 LONG TERM (CURRENT) USE OF ANTICOAGULANTS: ICD-10-CM

## 2019-06-12 DIAGNOSIS — I48.91 ATRIAL FIBRILLATION WITH RVR: ICD-10-CM

## 2019-06-12 NOTE — PROGRESS NOTES
Fax result taken from _[facility]__.   INR _2.5 (therapeutic)__ Date drawn_6/11/2019_.  Will maintain current dose of Warfarin 3.75 mg every Saturday and 2.5 mg on all other days per week.  Patient will recheck INR in 1 week.

## 2019-06-19 ENCOUNTER — ANTI-COAG VISIT (OUTPATIENT)
Dept: CARDIOLOGY | Facility: CLINIC | Age: 74
End: 2019-06-19
Payer: MEDICARE

## 2019-06-19 DIAGNOSIS — I48.91 ATRIAL FIBRILLATION WITH RVR: ICD-10-CM

## 2019-06-19 DIAGNOSIS — Z79.01 LONG TERM (CURRENT) USE OF ANTICOAGULANTS: ICD-10-CM

## 2019-06-19 LAB — INR PPP: 3.5

## 2019-06-19 PROCEDURE — 93793 PR ANTICOAGULANT MGMT FOR PT TAKING WARFARIN: ICD-10-PCS | Mod: ,,,

## 2019-06-19 PROCEDURE — 93793 ANTICOAG MGMT PT WARFARIN: CPT | Mod: ,,,

## 2019-06-19 NOTE — PROGRESS NOTES
Fax result received from [employer].   INR 3.5.   Date drawn 6/19/2019.  Patient/Wife contacted: Mr. Pitts was on vacation this week and may have drank a little more alcohol than usual.  Instructions given for patient to take warfarin 1.25mg on today; then resume current dose of 3.75mg on Saturdays; and 2.5mg on all other days.  Recheck in 1 week. Wife repeated directions and voiced understanding

## 2019-06-25 ENCOUNTER — ANTI-COAG VISIT (OUTPATIENT)
Dept: CARDIOLOGY | Facility: CLINIC | Age: 74
End: 2019-06-25
Payer: MEDICARE

## 2019-06-25 DIAGNOSIS — Z79.01 LONG TERM (CURRENT) USE OF ANTICOAGULANTS: ICD-10-CM

## 2019-06-25 DIAGNOSIS — I48.91 ATRIAL FIBRILLATION WITH RVR: ICD-10-CM

## 2019-06-25 LAB — INR PPP: 2.7

## 2019-06-25 NOTE — PROGRESS NOTES
Fax result received from [employer] on 6/25/2019.  INR: 2.7 (therapeutic).  Will maintain current dose of Warfarin 3.75 mg every Saturday and 2.5 mg on all other days per week.  Patient will recheck INR in 1 week.

## 2019-07-02 ENCOUNTER — ANTI-COAG VISIT (OUTPATIENT)
Dept: CARDIOLOGY | Facility: CLINIC | Age: 74
End: 2019-07-02
Payer: MEDICARE

## 2019-07-02 DIAGNOSIS — I48.91 ATRIAL FIBRILLATION WITH RVR: ICD-10-CM

## 2019-07-02 DIAGNOSIS — Z79.01 LONG TERM (CURRENT) USE OF ANTICOAGULANTS: ICD-10-CM

## 2019-07-02 LAB — INR PPP: 2.7

## 2019-07-02 NOTE — PROGRESS NOTES
Fax results received from [employer] on 7/02/2019.  INR:  2.7 (therapeutic).  Tested on 7/02/2019.  Will maintain current dose of Warfarin 2.5 mg Sunday through Friday and 3.75 mg every Saturday.  Patient will recheck INR in 1 week.

## 2019-07-09 ENCOUNTER — ANTI-COAG VISIT (OUTPATIENT)
Dept: CARDIOLOGY | Facility: CLINIC | Age: 74
End: 2019-07-09
Payer: MEDICARE

## 2019-07-09 DIAGNOSIS — I48.91 ATRIAL FIBRILLATION WITH RVR: ICD-10-CM

## 2019-07-09 DIAGNOSIS — Z79.01 LONG TERM (CURRENT) USE OF ANTICOAGULANTS: ICD-10-CM

## 2019-07-09 LAB — INR PPP: 2.5

## 2019-07-09 NOTE — PROGRESS NOTES
Fax results received from [employer]  INR:  2.9.  Date drawn 7/09/2019. Patient will resume current dose of warfarin 3.75mg on Saturdays and 2.5mg on all other days of the week.  Recheck in 1 week.

## 2019-07-16 ENCOUNTER — ANTI-COAG VISIT (OUTPATIENT)
Dept: CARDIOLOGY | Facility: CLINIC | Age: 74
End: 2019-07-16
Payer: MEDICARE

## 2019-07-16 DIAGNOSIS — I48.91 ATRIAL FIBRILLATION WITH RVR: ICD-10-CM

## 2019-07-16 DIAGNOSIS — Z79.01 LONG TERM (CURRENT) USE OF ANTICOAGULANTS: ICD-10-CM

## 2019-07-16 LAB — INR PPP: 2.5

## 2019-07-16 NOTE — PROGRESS NOTES
Fax received from [facility] [employer].  INR: 2.5 (therapeutic) - tested on 7/16/2019.  Will maintain current dose of Warfarin 2.5 mg Sunday through Friday and 3.75 mg every Saturday.  Patient will recheck INR in 1 week.

## 2019-07-23 ENCOUNTER — ANTI-COAG VISIT (OUTPATIENT)
Dept: CARDIOLOGY | Facility: CLINIC | Age: 74
End: 2019-07-23
Payer: MEDICARE

## 2019-07-23 DIAGNOSIS — I48.91 ATRIAL FIBRILLATION WITH RVR: ICD-10-CM

## 2019-07-23 DIAGNOSIS — Z79.01 LONG TERM (CURRENT) USE OF ANTICOAGULANTS: ICD-10-CM

## 2019-07-23 LAB — INR PPP: 2.6

## 2019-07-23 PROCEDURE — 93793 ANTICOAG MGMT PT WARFARIN: CPT | Mod: S$PBB,,,

## 2019-07-23 PROCEDURE — 93793 PR ANTICOAGULANT MGMT FOR PT TAKING WARFARIN: ICD-10-PCS | Mod: S$PBB,,,

## 2019-07-23 NOTE — PROGRESS NOTES
Fax a received from [employer].  INR:  2.6 (therapeutic).  Tested on 7/23/2019.  Will maintain current dose of Warfarin 2.5 mg Sunday through Friday and 3.75 mg every Saturday.  Patient will recheck INR in 1 week.

## 2019-07-30 ENCOUNTER — ANTI-COAG VISIT (OUTPATIENT)
Dept: CARDIOLOGY | Facility: CLINIC | Age: 74
End: 2019-07-30
Payer: MEDICARE

## 2019-07-30 DIAGNOSIS — Z79.01 LONG TERM (CURRENT) USE OF ANTICOAGULANTS: ICD-10-CM

## 2019-07-30 DIAGNOSIS — I48.91 ATRIAL FIBRILLATION WITH RVR: ICD-10-CM

## 2019-07-30 LAB — INR PPP: 2.7

## 2019-07-30 PROCEDURE — G0250 MD INR TEST REVIE INTER MGMT: HCPCS | Mod: ,,, | Performed by: INTERNAL MEDICINE

## 2019-07-30 PROCEDURE — G0250 PR MD REVIEW INTERPRET OF TEST: ICD-10-PCS | Mod: ,,, | Performed by: INTERNAL MEDICINE

## 2019-07-30 NOTE — PROGRESS NOTES
Fax result received from [employer] on 7/30/2019.  INR:  2.7 (therapeutic).  Tested on 7/30/2019.  Will maintain current dose of Warfarin 2.5 mg Sunday through Friday and 3.75 mg every Saturday.  Patient will recheck INR in 1 week.

## 2019-08-06 ENCOUNTER — ANTI-COAG VISIT (OUTPATIENT)
Dept: CARDIOLOGY | Facility: CLINIC | Age: 74
End: 2019-08-06
Payer: MEDICARE

## 2019-08-06 DIAGNOSIS — I48.91 ATRIAL FIBRILLATION WITH RVR: ICD-10-CM

## 2019-08-06 DIAGNOSIS — Z79.01 LONG TERM (CURRENT) USE OF ANTICOAGULANTS: ICD-10-CM

## 2019-08-06 LAB — INR PPP: 2.5

## 2019-08-06 NOTE — PROGRESS NOTES
Fax result received from [employer] [employer].  INR:  2.5 (therapeutic).  Tested on 8/06/2019.  Will maintain current dose of Warfarin 2.5 mg Sunday through Friday and 3.75 mg every Saturday.  Patient will recheck INR in 1 week.

## 2019-08-13 ENCOUNTER — ANTI-COAG VISIT (OUTPATIENT)
Dept: CARDIOLOGY | Facility: CLINIC | Age: 74
End: 2019-08-13
Payer: MEDICARE

## 2019-08-13 DIAGNOSIS — I48.91 ATRIAL FIBRILLATION WITH RVR: ICD-10-CM

## 2019-08-13 DIAGNOSIS — Z79.01 LONG TERM (CURRENT) USE OF ANTICOAGULANTS: ICD-10-CM

## 2019-08-13 LAB — INR PPP: 2.3

## 2019-08-13 PROCEDURE — 93793 ANTICOAG MGMT PT WARFARIN: CPT | Mod: ,,,

## 2019-08-13 PROCEDURE — 93793 PR ANTICOAGULANT MGMT FOR PT TAKING WARFARIN: ICD-10-PCS | Mod: ,,,

## 2019-08-13 NOTE — PROGRESS NOTES
Fax results received from [employer].  INR:  2.3.  Date drawn 8/13/2019. Patient contacted.  Spoke with wife, Mrs. Flores.  No missed doses or dietary changes reported.  Instructions given for patient to take warfarin 3.75mg on today; then resume current dose of warfarin 3.75mg on Saturdays and 2.5mg on all other days of the week.  Caregiver repeated directions and voiced understanding.  Recheck in 1 week.

## 2019-08-20 ENCOUNTER — ANTI-COAG VISIT (OUTPATIENT)
Dept: CARDIOLOGY | Facility: CLINIC | Age: 74
End: 2019-08-20
Payer: MEDICARE

## 2019-08-20 DIAGNOSIS — I48.91 ATRIAL FIBRILLATION WITH RVR: ICD-10-CM

## 2019-08-20 DIAGNOSIS — Z79.01 LONG TERM (CURRENT) USE OF ANTICOAGULANTS: ICD-10-CM

## 2019-08-20 LAB — INR PPP: 2.2

## 2019-08-20 PROCEDURE — 93793 PR ANTICOAGULANT MGMT FOR PT TAKING WARFARIN: ICD-10-PCS | Mod: ,,,

## 2019-08-20 PROCEDURE — 93793 ANTICOAG MGMT PT WARFARIN: CPT | Mod: ,,,

## 2019-08-20 NOTE — PROGRESS NOTES
Fax results received from [employer].  INR:  2.2.  Date drawn 8/20/2019. Patient contacted.  Spoke with wife, Mrs. Flores.  INR remains low despite boosted dose.  No missed doses or dietary changes reported.  Instructions given for patient to take warfarin 3.75mg on today; then increase dose of warfarin to 3.75mg on Saturdays, Sundays and 2.5mg on all other days of the week.  Caregiver repeated directions and voiced understanding.  Recheck in 1 week.    Mrs. Pitts states she received a letter from her insurance company stating they are no longer covering once a week checks.  Advised caregiver to contact insurance company to see how often the patient is allowed to have INR checked.

## 2019-08-21 ENCOUNTER — TELEPHONE (OUTPATIENT)
Dept: CARDIOLOGY | Facility: CLINIC | Age: 74
End: 2019-08-21

## 2019-08-21 NOTE — TELEPHONE ENCOUNTER
MD Charley Pinzon, LPN   Caller: Unspecified (Today, 12:15 PM)             ok      The patient has been notified of this information and all questions answered.

## 2019-08-21 NOTE — TELEPHONE ENCOUNTER
Pt is currently having coumadin checked once weekly. Medicare only covers checks once monthly. Pt requesting checks every other week instead. Please advise.

## 2019-09-03 ENCOUNTER — ANTI-COAG VISIT (OUTPATIENT)
Dept: CARDIOLOGY | Facility: CLINIC | Age: 74
End: 2019-09-03
Payer: MEDICARE

## 2019-09-03 DIAGNOSIS — I48.91 ATRIAL FIBRILLATION WITH RVR: ICD-10-CM

## 2019-09-03 DIAGNOSIS — Z79.01 LONG TERM (CURRENT) USE OF ANTICOAGULANTS: ICD-10-CM

## 2019-09-03 LAB — INR PPP: 2.7

## 2019-09-03 PROCEDURE — 93793 ANTICOAG MGMT PT WARFARIN: CPT | Mod: ,,,

## 2019-09-03 PROCEDURE — 93793 PR ANTICOAGULANT MGMT FOR PT TAKING WARFARIN: ICD-10-PCS | Mod: ,,,

## 2019-09-03 NOTE — PROGRESS NOTES
Fax result received from [employer].  INR: 2.7 (therapeutic).  Tested on 9/03/2019.  Will maintain current dose of Warfarin 2.5 mg Monday through Friday and 3.75 mg every Sunday and Saturday.  Patient will recheck INR in 2 weeks.

## 2019-09-05 ENCOUNTER — CLINICAL SUPPORT (OUTPATIENT)
Dept: CARDIOLOGY | Facility: CLINIC | Age: 74
End: 2019-09-05
Attending: PHYSICIAN ASSISTANT
Payer: MEDICARE

## 2019-09-05 ENCOUNTER — HOSPITAL ENCOUNTER (OUTPATIENT)
Dept: RADIOLOGY | Facility: HOSPITAL | Age: 74
Discharge: HOME | End: 2019-09-05
Attending: PHYSICIAN ASSISTANT
Payer: MEDICARE

## 2019-09-05 DIAGNOSIS — E78.5 HYPERLIPIDEMIA, UNSPECIFIED HYPERLIPIDEMIA TYPE: Chronic | ICD-10-CM

## 2019-09-05 DIAGNOSIS — I25.10 CORONARY ARTERY DISEASE INVOLVING NATIVE CORONARY ARTERY OF NATIVE HEART WITHOUT ANGINA PECTORIS: Chronic | ICD-10-CM

## 2019-09-05 DIAGNOSIS — I10 ESSENTIAL HYPERTENSION: ICD-10-CM

## 2019-09-05 DIAGNOSIS — I25.2 OLD MI (MYOCARDIAL INFARCTION): ICD-10-CM

## 2019-09-05 DIAGNOSIS — E78.5 HYPERLIPIDEMIA, UNSPECIFIED HYPERLIPIDEMIA TYPE: ICD-10-CM

## 2019-09-05 DIAGNOSIS — I25.10 CORONARY ARTERY DISEASE INVOLVING NATIVE CORONARY ARTERY OF NATIVE HEART WITHOUT ANGINA PECTORIS: ICD-10-CM

## 2019-09-05 LAB
AORTIC VALVE REGURGITATION: ABNORMAL
DIASTOLIC DYSFUNCTION: NO
RETIRED EF AND QEF - SEE NOTES: 40 (ref 55–65)

## 2019-09-05 PROCEDURE — 93016 NM MULTI PHARM STRESS CARDIAC COMPONENT: ICD-10-PCS | Mod: S$PBB,,, | Performed by: INTERNAL MEDICINE

## 2019-09-05 PROCEDURE — 93306 2D ECHO WITH COLOR FLOW DOPPLER: ICD-10-PCS | Mod: 26,S$PBB,, | Performed by: INTERNAL MEDICINE

## 2019-09-05 PROCEDURE — 78452 HT MUSCLE IMAGE SPECT MULT: CPT | Mod: 26,,, | Performed by: INTERNAL MEDICINE

## 2019-09-05 PROCEDURE — A9502 TC99M TETROFOSMIN: HCPCS

## 2019-09-05 PROCEDURE — 93018 CV STRESS TEST I&R ONLY: CPT | Mod: S$PBB,,, | Performed by: INTERNAL MEDICINE

## 2019-09-05 PROCEDURE — 93306 TTE W/DOPPLER COMPLETE: CPT | Mod: PBBFAC | Performed by: INTERNAL MEDICINE

## 2019-09-05 PROCEDURE — 93018 NM MULTI PHARM STRESS CARDIAC COMPONENT: ICD-10-PCS | Mod: S$PBB,,, | Performed by: INTERNAL MEDICINE

## 2019-09-05 PROCEDURE — 78452 NM MULTI PHARM STRESS CARDIAC COMPONENT: ICD-10-PCS | Mod: 26,,, | Performed by: INTERNAL MEDICINE

## 2019-09-05 PROCEDURE — 93016 CV STRESS TEST SUPVJ ONLY: CPT | Mod: S$PBB,,, | Performed by: INTERNAL MEDICINE

## 2019-09-06 ENCOUNTER — TELEPHONE (OUTPATIENT)
Dept: CARDIOLOGY | Facility: CLINIC | Age: 74
End: 2019-09-06

## 2019-09-06 NOTE — TELEPHONE ENCOUNTER
----- Message from Donna Veronica PA-C sent at 9/6/2019  4:05 PM CDT -----  Please see earlier message, he needs appt next week. His stress test is abnormal    Thanks

## 2019-09-06 NOTE — TELEPHONE ENCOUNTER
I spoke with patient. He is traveling out of town due to impending birth of his grandchild and will likely be out of town for 1-4 weeks.    Denies any symptoms. No chest pain or SOB/anginal equivalent.    Will further discuss with Dr. Raza and possibly arrange C in one month.    Patient was advised to go to ED with any concerning symptoms. He is on a good medical regimen, is taking ASA 81 mg daily.

## 2019-09-06 NOTE — TELEPHONE ENCOUNTER
"Called pt and notified him of abnormal stress test and to come in for an appt next week, Pt stated, "I am going out of town for a couple of weeks maybe a month due to birth of a grandchild.    Pt is requesting to speak with Ms Veronica.  "

## 2019-09-09 ENCOUNTER — TELEPHONE (OUTPATIENT)
Dept: CARDIOLOGY | Facility: CLINIC | Age: 74
End: 2019-09-09

## 2019-09-09 NOTE — TELEPHONE ENCOUNTER
----- Message from Donna Veronica PA-C sent at 9/9/2019 10:11 AM CDT -----  Please phone patient. We can arrange clinic appt to schedule LHC in approximately 3-4 weeks, at his preferred date/time.    IF he has any concerning symptoms of chest pain/SOB, he is to go to ED immediately    Thanks

## 2019-09-10 NOTE — TELEPHONE ENCOUNTER
Returned call and rescheduled 9/12 appointment to 9/26 and advised of change in stress test and need to possibly have heart cath in a month per Dr. Raza.      ----- Message from Hailey Chen sent at 9/10/2019  7:49 AM CDT -----  Patient returning call from yesterday..[phone] (work)

## 2019-09-17 ENCOUNTER — ANTI-COAG VISIT (OUTPATIENT)
Dept: CARDIOLOGY | Facility: CLINIC | Age: 74
End: 2019-09-17
Payer: MEDICARE

## 2019-09-17 DIAGNOSIS — I48.91 ATRIAL FIBRILLATION WITH RVR: ICD-10-CM

## 2019-09-17 DIAGNOSIS — Z79.01 LONG TERM (CURRENT) USE OF ANTICOAGULANTS: ICD-10-CM

## 2019-09-17 LAB — INR PPP: 3.5

## 2019-09-17 PROCEDURE — G0250 MD INR TEST REVIE INTER MGMT: HCPCS | Mod: ,,, | Performed by: INTERNAL MEDICINE

## 2019-09-17 PROCEDURE — G0250 PR MD REVIEW INTERPRET OF TEST: ICD-10-PCS | Mod: ,,, | Performed by: INTERNAL MEDICINE

## 2019-09-17 NOTE — PROGRESS NOTES
Fax results received from [employer].  INR:  3.5.  Date drawn 9/17/2019. Patient contacted.  Spoke with wife, Mrs. Flores.  No significant changes or signs of bleeding noted.  Patient advised to seek immediate medical attention if he notices any abnormal bleeding.  Instructions given for patient to hold dose of warfarin on today; then resume current dose of warfarin 3.75mg on Saturdays, Sundays and 2.5mg on all other days of the week.  Advised patient should call coumadin clinic once he receives date of left heart cath.  Caregiver repeated instructions and voiced understanding.  Recheck INR in 2 weeks.

## 2019-09-25 ENCOUNTER — PATIENT MESSAGE (OUTPATIENT)
Dept: CARDIOLOGY | Facility: CLINIC | Age: 74
End: 2019-09-25

## 2019-10-01 ENCOUNTER — ANTI-COAG VISIT (OUTPATIENT)
Dept: CARDIOLOGY | Facility: CLINIC | Age: 74
End: 2019-10-01
Payer: MEDICARE

## 2019-10-01 DIAGNOSIS — Z79.01 LONG TERM (CURRENT) USE OF ANTICOAGULANTS: ICD-10-CM

## 2019-10-01 DIAGNOSIS — I48.91 ATRIAL FIBRILLATION WITH RVR: ICD-10-CM

## 2019-10-01 LAB — INR PPP: 2.9

## 2019-10-01 NOTE — PROGRESS NOTES
Fax received from [employer]:  INR therapeutic at 2.9.  Patient contacted - Left V/M to continue with current dose of Warfarin 3.75 mg every Sunday and Saturday; and 2.5 mg on all other days per week (Monday through Friday).  Recheck INR in 2 weeks.  Any questions or concerns, please contact the Coumadin Clinic at [phone].

## 2019-10-15 ENCOUNTER — ANTI-COAG VISIT (OUTPATIENT)
Dept: CARDIOLOGY | Facility: CLINIC | Age: 74
End: 2019-10-15
Payer: MEDICARE

## 2019-10-15 DIAGNOSIS — I48.91 ATRIAL FIBRILLATION WITH RVR: ICD-10-CM

## 2019-10-15 LAB — INR PPP: 3.7

## 2019-10-15 PROCEDURE — 85610 PROTHROMBIN TIME: CPT | Mod: QW,,, | Performed by: INTERNAL MEDICINE

## 2019-10-15 PROCEDURE — 85610 CHG PROTHROMBIN TIME: ICD-10-PCS | Mod: QW,,, | Performed by: INTERNAL MEDICINE

## 2019-10-15 NOTE — PROGRESS NOTES
Fax results received from [employer].  INR:  3.7.  Date drawn 10/15/2019. Patient contacted.  No significant changes or signs of bleeding noted.  Patient advised to seek immediate medical attention if he notices any abnormal bleeding.  Instructions given for patient to hold dose of warfarin on today; then lower dose of warfarin to 3.75mg on Sundays and 2.5mg on all other days of the week.   Patient repeated instructions and voiced understanding.  Recheck INR in 2 weeks.

## 2019-10-18 ENCOUNTER — HOSPITAL ENCOUNTER (OUTPATIENT)
Dept: RADIOLOGY | Facility: HOSPITAL | Age: 74
Discharge: HOME | End: 2019-10-18
Attending: PHYSICIAN ASSISTANT
Payer: MEDICARE

## 2019-10-18 ENCOUNTER — OFFICE VISIT (OUTPATIENT)
Dept: CARDIOLOGY | Facility: CLINIC | Age: 74
End: 2019-10-18
Payer: MEDICARE

## 2019-10-18 VITALS
BODY MASS INDEX: 31.42 KG/M2 | OXYGEN SATURATION: 96 % | HEART RATE: 72 BPM | WEIGHT: 212.75 LBS | SYSTOLIC BLOOD PRESSURE: 132 MMHG | DIASTOLIC BLOOD PRESSURE: 82 MMHG

## 2019-10-18 DIAGNOSIS — I50.23 ACUTE ON CHRONIC SYSTOLIC CONGESTIVE HEART FAILURE: ICD-10-CM

## 2019-10-18 DIAGNOSIS — R94.39 ABNORMAL STRESS TEST: ICD-10-CM

## 2019-10-18 DIAGNOSIS — Z98.61 S/P PTCA (PERCUTANEOUS TRANSLUMINAL CORONARY ANGIOPLASTY): ICD-10-CM

## 2019-10-18 DIAGNOSIS — R94.39 ABNORMAL STRESS TEST: Primary | ICD-10-CM

## 2019-10-18 DIAGNOSIS — Z95.1 S/P CABG (CORONARY ARTERY BYPASS GRAFT): ICD-10-CM

## 2019-10-18 DIAGNOSIS — I50.9 CHF (NYHA CLASS III, ACC/AHA STAGE C): Chronic | ICD-10-CM

## 2019-10-18 DIAGNOSIS — I10 ESSENTIAL HYPERTENSION: ICD-10-CM

## 2019-10-18 DIAGNOSIS — G47.33 OBSTRUCTIVE SLEEP APNEA SYNDROME: Chronic | ICD-10-CM

## 2019-10-18 DIAGNOSIS — I48.91 ATRIAL FIBRILLATION WITH RVR: ICD-10-CM

## 2019-10-18 DIAGNOSIS — I50.9 CHF (NYHA CLASS III, ACC/AHA STAGE C): ICD-10-CM

## 2019-10-18 PROCEDURE — 99215 OFFICE O/P EST HI 40 MIN: CPT | Mod: S$PBB,,, | Performed by: PHYSICIAN ASSISTANT

## 2019-10-18 PROCEDURE — 71046 X-RAY EXAM CHEST 2 VIEWS: CPT | Mod: 26,,, | Performed by: RADIOLOGY

## 2019-10-18 PROCEDURE — 99215 PR OFFICE/OUTPT VISIT, EST, LEVL V, 40-54 MIN: ICD-10-PCS | Mod: S$PBB,,, | Performed by: PHYSICIAN ASSISTANT

## 2019-10-18 PROCEDURE — 71046 XR CHEST PA AND LATERAL: ICD-10-PCS | Mod: 26,,, | Performed by: RADIOLOGY

## 2019-10-18 PROCEDURE — 99999 PR PBB SHADOW E&M-EST. PATIENT-LVL III: CPT | Mod: PBBFAC,,, | Performed by: PHYSICIAN ASSISTANT

## 2019-10-18 PROCEDURE — 71046 X-RAY EXAM CHEST 2 VIEWS: CPT | Mod: TC

## 2019-10-18 PROCEDURE — 99999 PR PBB SHADOW E&M-EST. PATIENT-LVL III: ICD-10-PCS | Mod: PBBFAC,,, | Performed by: PHYSICIAN ASSISTANT

## 2019-10-18 PROCEDURE — 99213 OFFICE O/P EST LOW 20 MIN: CPT | Mod: PBBFAC,25 | Performed by: PHYSICIAN ASSISTANT

## 2019-10-18 NOTE — PROGRESS NOTES
Subjective:    Patient ID:  Jethro Pitts is a 74 y.o. male who presents for follow-up of Follow-up and result review      HPI  Mr. Pitts is a 73 year old male patient whose current medical conditions include atrial fibrillation, atrial flutter (previoulsy on Eliquis, now on Coumadin s/p successful PVI and AFL ablation 5/24/16 by Dr. Villafuerte) systolic CHF, cardiomyopathy, CAD s/p CABG, old MI, HTN, and hyperlipidemia who presents today for follow-up. Patient previously seen by me and had MPI stress test and 2D echo ordered as part of routine work-up. He returns today and states he is feeling well. No cardiac complaints. Denies chest pain, SOB, or other anginal signs or symptoms. No s/s suggestive of CHF. No lightheadedness, dizziness, near syncope, or syncope. BP controlled. Patient is compliant with his medications. Remains on Coumadin. No bleeding issues. No s/s of TIA/CVA.    MPI stress test and 2D echo results discussed. MPI stress test positive for evidence for a small to moderate sized area of mild to moderate myocardial ischemia that extends from the mid to the distal inferolateral wall of the left ventricle with underlying injury present. Not seen on prior MPI stress test in 2016. 2D echo showed EF of 40%, +WMA.    Review of Systems   Constitution: Negative for chills, decreased appetite, fever and malaise/fatigue.   HENT: Negative for congestion, hoarse voice and sore throat.    Eyes: Negative for blurred vision and discharge.   Cardiovascular: Negative for chest pain, claudication, cyanosis, dyspnea on exertion, irregular heartbeat, leg swelling, near-syncope, orthopnea, palpitations and paroxysmal nocturnal dyspnea.   Respiratory: Negative for cough, hemoptysis, shortness of breath, snoring, sputum production and wheezing.    Endocrine: Negative for cold intolerance and heat intolerance.   Hematologic/Lymphatic: Negative for bleeding problem. Does not bruise/bleed easily.   Skin: Negative for rash.    Musculoskeletal: Negative for arthritis, back pain, joint pain, joint swelling, muscle cramps, muscle weakness and myalgias.   Gastrointestinal: Negative for abdominal pain, constipation, diarrhea, heartburn, melena and nausea.   Genitourinary: Negative for hematuria.   Neurological: Negative for dizziness, focal weakness, headaches, light-headedness, loss of balance, numbness, paresthesias, seizures and weakness.   Psychiatric/Behavioral: Negative for memory loss. The patient does not have insomnia.    Allergic/Immunologic: Negative for hives.     /82 (BP Location: Left arm, Patient Position: Sitting)   Pulse 72   Wt 96.5 kg (212 lb 11.9 oz)   SpO2 96%   BMI 31.42 kg/m²     Past Medical History:   Diagnosis Date    Acute coronary syndrome     Anticoagulant long-term use     Atrial fibrillation     Atrial flutter     CHF (congestive heart failure)     Coronary artery disease     Depression     Encounter for blood transfusion     Hyperlipidemia     Hypertension     Old MI (myocardial infarction)     S/P CABG (coronary artery bypass graft) 12/5/2013    S/P PTCA (percutaneous transluminal coronary angioplasty) 12/5/2013    Sleep apnea      Past Surgical History:   Procedure Laterality Date    CORONARY ANGIOPLASTY      CORONARY ARTERY BYPASS GRAFT      EYE SURGERY      KNEE SURGERY      RADIOFREQUENCY ABLATION  05/24/2016    AFL and AF      Family History   Problem Relation Age of Onset    Coronary artery disease Mother     Coronary artery disease Sister     Coronary artery disease Brother      Social History     Socioeconomic History    Marital status:      Spouse name: Not on file    Number of children: Not on file    Years of education: Not on file    Highest education level: Not on file   Occupational History    Not on file   Social Needs    Financial resource strain: Not on file    Food insecurity:     Worry: Not on file     Inability: Not on file    Transportation  needs:     Medical: Not on file     Non-medical: Not on file   Tobacco Use    Smoking status: Former Smoker     Packs/day: 0.50     Years: 15.00     Pack years: 7.50     Last attempt to quit: 1983     Years since quittin.8   Substance and Sexual Activity    Alcohol use: No     Alcohol/week: 49.0 standard drinks     Types: 7 Glasses of wine, 42 Cans of beer per week    Drug use: No    Sexual activity: Not on file   Lifestyle    Physical activity:     Days per week: Not on file     Minutes per session: Not on file    Stress: Not on file   Relationships    Social connections:     Talks on phone: Not on file     Gets together: Not on file     Attends Congregational service: Not on file     Active member of club or organization: Not on file     Attends meetings of clubs or organizations: Not on file     Relationship status: Not on file   Other Topics Concern    Not on file   Social History Narrative    Not on file     Review of patient's allergies indicates:   Allergen Reactions    Pcn [penicillins] Rash     Medication List with Changes/Refills   Current Medications    ATORVASTATIN (LIPITOR) 20 MG TABLET    Take 20 mg by mouth once daily.    CARVEDILOL (COREG) 6.25 MG TABLET    TAKE 1 TABLET IN THE MORNING  AND TAKE 2 TABLETS AT NIGHT    ESCITALOPRAM OXALATE (LEXAPRO) 10 MG TABLET    Take 10 mg by mouth once daily.     FISH OIL-OMEGA-3 FATTY ACIDS 300-1,000 MG CAPSULE    Take 1,290 g by mouth once daily.     VITAMIN D 1000 UNITS TAB    Take 185 mg by mouth.    WARFARIN (COUMADIN) 2.5 MG TABLET    Take 1.5 tablets by mouth on ,  and ;  and 1 tablet on all other days.       Objective:    Physical Exam   Constitutional: He is oriented to person, place, and time. He appears well-developed and well-nourished. No distress.   HENT:   Head: Normocephalic and atraumatic.   Eyes: Pupils are equal, round, and reactive to light. Right eye exhibits no discharge. Left eye exhibits no discharge.    Neck: Neck supple. No JVD present.   Cardiovascular: Normal rate, regular rhythm, S1 normal, S2 normal and normal heart sounds.   No murmur heard.  Pulmonary/Chest: Effort normal and breath sounds normal. No respiratory distress. He has no wheezes. He has no rales.   CABG site well-healed   Abdominal: Soft. He exhibits no distension.   Musculoskeletal: He exhibits no edema.   Neurological: He is alert and oriented to person, place, and time.   Skin: Skin is warm and dry. He is not diaphoretic. No erythema.   Psychiatric: He has a normal mood and affect. His behavior is normal. Thought content normal.   Nursing note and vitals reviewed.    2D Echo CONCLUSIONS     1 - Concentric hypertrophy.     2 - Wall motion abnormalities.     3 - Mildly to moderately depressed left ventricular systolic function (EF 40-45%).     4 - Indeterminate LV diastolic function.     5 - Normal right ventricular systolic function .     6 - Trivial aortic regurgitation.     Impression: ABNORMAL MYOCARDIAL PERFUSION  1. There is evidence for a small to moderate sized area of mild to moderate myocardial ischemia that extends from the mid to the distal inferolateral wall of the left ventricle with underlying injury present.   2. There is a small to moderate size fixed defect of moderate intensity that extends from the mid to the distal inferior wall of the left ventricle, consistent with myocardial injury.   3. There is abnormal wall motion at rest showing mild hypokinesis of the inferior wall of the left ventricle.   4. There is resting LV dysfunction with a reduced ejection fraction of 48 %.   5. The ventricular volumes are normal at rest and stress.   6. The extracardiac distribution of radioactivity is normal.   Assessment:       1. Abnormal stress test    2. Obstructive sleep apnea syndrome    3. CHF (NYHA class III, ACC/AHA stage C)    4. Essential hypertension    5. S/P CABG (coronary artery bypass graft)    6. S/P PTCA (percutaneous  transluminal coronary angioplasty)    7. Atrial fibrillation with RVR    8. Acute on chronic systolic congestive heart failure      Patient presents for f/u. Doing clinically well but MPI stress test abnormal. Discussed results in detail. Given his history of CABG, would benefit from repeat [facility] for definitive evaluation. All risks, benefits, and treatment alternatives explained to patient in detail. All questions answered. He has agreed to proceed. Personally consented by me in office today. He is aware he will have to hold Coumadin 5 days prior to procedure.   Plan:   -Continue current medical management and risk factor modification  -Cardiac, low salt diet  -CBC, CMP, PTT, INR, CXR today with phone review  -[facility] on 10/22/19 at 8:30 AM, pre-op instructions discussed/given

## 2019-10-18 NOTE — H&P (VIEW-ONLY)
Subjective:    Patient ID:  Jethro Pitts is a 74 y.o. male who presents for follow-up of Follow-up and result review      HPI  Mr. Pitts is a 73 year old male patient whose current medical conditions include atrial fibrillation, atrial flutter (previoulsy on Eliquis, now on Coumadin s/p successful PVI and AFL ablation 5/24/16 by Dr. Villafuerte) systolic CHF, cardiomyopathy, CAD s/p CABG, old MI, HTN, and hyperlipidemia who presents today for follow-up. Patient previously seen by me and had MPI stress test and 2D echo ordered as part of routine work-up. He returns today and states he is feeling well. No cardiac complaints. Denies chest pain, SOB, or other anginal signs or symptoms. No s/s suggestive of CHF. No lightheadedness, dizziness, near syncope, or syncope. BP controlled. Patient is compliant with his medications. Remains on Coumadin. No bleeding issues. No s/s of TIA/CVA.    MPI stress test and 2D echo results discussed. MPI stress test positive for evidence for a small to moderate sized area of mild to moderate myocardial ischemia that extends from the mid to the distal inferolateral wall of the left ventricle with underlying injury present. Not seen on prior MPI stress test in 2016. 2D echo showed EF of 40%, +WMA.    Review of Systems   Constitution: Negative for chills, decreased appetite, fever and malaise/fatigue.   HENT: Negative for congestion, hoarse voice and sore throat.    Eyes: Negative for blurred vision and discharge.   Cardiovascular: Negative for chest pain, claudication, cyanosis, dyspnea on exertion, irregular heartbeat, leg swelling, near-syncope, orthopnea, palpitations and paroxysmal nocturnal dyspnea.   Respiratory: Negative for cough, hemoptysis, shortness of breath, snoring, sputum production and wheezing.    Endocrine: Negative for cold intolerance and heat intolerance.   Hematologic/Lymphatic: Negative for bleeding problem. Does not bruise/bleed easily.   Skin: Negative for rash.    Musculoskeletal: Negative for arthritis, back pain, joint pain, joint swelling, muscle cramps, muscle weakness and myalgias.   Gastrointestinal: Negative for abdominal pain, constipation, diarrhea, heartburn, melena and nausea.   Genitourinary: Negative for hematuria.   Neurological: Negative for dizziness, focal weakness, headaches, light-headedness, loss of balance, numbness, paresthesias, seizures and weakness.   Psychiatric/Behavioral: Negative for memory loss. The patient does not have insomnia.    Allergic/Immunologic: Negative for hives.     /82 (BP Location: Left arm, Patient Position: Sitting)   Pulse 72   Wt 96.5 kg (212 lb 11.9 oz)   SpO2 96%   BMI 31.42 kg/m²     Past Medical History:   Diagnosis Date    Acute coronary syndrome     Anticoagulant long-term use     Atrial fibrillation     Atrial flutter     CHF (congestive heart failure)     Coronary artery disease     Depression     Encounter for blood transfusion     Hyperlipidemia     Hypertension     Old MI (myocardial infarction)     S/P CABG (coronary artery bypass graft) 12/5/2013    S/P PTCA (percutaneous transluminal coronary angioplasty) 12/5/2013    Sleep apnea      Past Surgical History:   Procedure Laterality Date    CORONARY ANGIOPLASTY      CORONARY ARTERY BYPASS GRAFT      EYE SURGERY      KNEE SURGERY      RADIOFREQUENCY ABLATION  05/24/2016    AFL and AF      Family History   Problem Relation Age of Onset    Coronary artery disease Mother     Coronary artery disease Sister     Coronary artery disease Brother      Social History     Socioeconomic History    Marital status:      Spouse name: Not on file    Number of children: Not on file    Years of education: Not on file    Highest education level: Not on file   Occupational History    Not on file   Social Needs    Financial resource strain: Not on file    Food insecurity:     Worry: Not on file     Inability: Not on file    Transportation  needs:     Medical: Not on file     Non-medical: Not on file   Tobacco Use    Smoking status: Former Smoker     Packs/day: 0.50     Years: 15.00     Pack years: 7.50     Last attempt to quit: 1983     Years since quittin.8   Substance and Sexual Activity    Alcohol use: No     Alcohol/week: 49.0 standard drinks     Types: 7 Glasses of wine, 42 Cans of beer per week    Drug use: No    Sexual activity: Not on file   Lifestyle    Physical activity:     Days per week: Not on file     Minutes per session: Not on file    Stress: Not on file   Relationships    Social connections:     Talks on phone: Not on file     Gets together: Not on file     Attends Yazdanism service: Not on file     Active member of club or organization: Not on file     Attends meetings of clubs or organizations: Not on file     Relationship status: Not on file   Other Topics Concern    Not on file   Social History Narrative    Not on file     Review of patient's allergies indicates:   Allergen Reactions    Pcn [penicillins] Rash     Medication List with Changes/Refills   Current Medications    ATORVASTATIN (LIPITOR) 20 MG TABLET    Take 20 mg by mouth once daily.    CARVEDILOL (COREG) 6.25 MG TABLET    TAKE 1 TABLET IN THE MORNING  AND TAKE 2 TABLETS AT NIGHT    ESCITALOPRAM OXALATE (LEXAPRO) 10 MG TABLET    Take 10 mg by mouth once daily.     FISH OIL-OMEGA-3 FATTY ACIDS 300-1,000 MG CAPSULE    Take 1,290 g by mouth once daily.     VITAMIN D 1000 UNITS TAB    Take 185 mg by mouth.    WARFARIN (COUMADIN) 2.5 MG TABLET    Take 1.5 tablets by mouth on ,  and ;  and 1 tablet on all other days.       Objective:    Physical Exam   Constitutional: He is oriented to person, place, and time. He appears well-developed and well-nourished. No distress.   HENT:   Head: Normocephalic and atraumatic.   Eyes: Pupils are equal, round, and reactive to light. Right eye exhibits no discharge. Left eye exhibits no discharge.    Neck: Neck supple. No JVD present.   Cardiovascular: Normal rate, regular rhythm, S1 normal, S2 normal and normal heart sounds.   No murmur heard.  Pulmonary/Chest: Effort normal and breath sounds normal. No respiratory distress. He has no wheezes. He has no rales.   CABG site well-healed   Abdominal: Soft. He exhibits no distension.   Musculoskeletal: He exhibits no edema.   Neurological: He is alert and oriented to person, place, and time.   Skin: Skin is warm and dry. He is not diaphoretic. No erythema.   Psychiatric: He has a normal mood and affect. His behavior is normal. Thought content normal.   Nursing note and vitals reviewed.    2D Echo CONCLUSIONS     1 - Concentric hypertrophy.     2 - Wall motion abnormalities.     3 - Mildly to moderately depressed left ventricular systolic function (EF 40-45%).     4 - Indeterminate LV diastolic function.     5 - Normal right ventricular systolic function .     6 - Trivial aortic regurgitation.     Impression: ABNORMAL MYOCARDIAL PERFUSION  1. There is evidence for a small to moderate sized area of mild to moderate myocardial ischemia that extends from the mid to the distal inferolateral wall of the left ventricle with underlying injury present.   2. There is a small to moderate size fixed defect of moderate intensity that extends from the mid to the distal inferior wall of the left ventricle, consistent with myocardial injury.   3. There is abnormal wall motion at rest showing mild hypokinesis of the inferior wall of the left ventricle.   4. There is resting LV dysfunction with a reduced ejection fraction of 48 %.   5. The ventricular volumes are normal at rest and stress.   6. The extracardiac distribution of radioactivity is normal.   Assessment:       1. Abnormal stress test    2. Obstructive sleep apnea syndrome    3. CHF (NYHA class III, ACC/AHA stage C)    4. Essential hypertension    5. S/P CABG (coronary artery bypass graft)    6. S/P PTCA (percutaneous  transluminal coronary angioplasty)    7. Atrial fibrillation with RVR    8. Acute on chronic systolic congestive heart failure      Patient presents for f/u. Doing clinically well but MPI stress test abnormal. Discussed results in detail. Given his history of CABG, would benefit from repeat [facility] for definitive evaluation. All risks, benefits, and treatment alternatives explained to patient in detail. All questions answered. He has agreed to proceed. Personally consented by me in office today. He is aware he will have to hold Coumadin 5 days prior to procedure.   Plan:   -Continue current medical management and risk factor modification  -Cardiac, low salt diet  -CBC, CMP, PTT, INR, CXR today with phone review  -[facility] on 10/22/19 at 8:30 AM, pre-op instructions discussed/given

## 2019-10-21 ENCOUNTER — TELEPHONE (OUTPATIENT)
Dept: CARDIOLOGY | Facility: CLINIC | Age: 74
End: 2019-10-21

## 2019-10-21 NOTE — TELEPHONE ENCOUNTER
Returned call and answered all questions.  Coumadin has been on hold since seen by Jonh STEINBERG    ----- Message from Sue Bonner sent at 10/21/2019 10:18 AM CDT -----  Contact: Patients wife, Sandra  Ms Sandra needs to clarify whether or not patient takes his medication before his angiogram, please call her back at [phone]. Thank you

## 2019-10-22 ENCOUNTER — HOSPITAL ENCOUNTER (OUTPATIENT)
Facility: HOSPITAL | Age: 74
Discharge: HOME | End: 2019-10-23
Attending: INTERNAL MEDICINE | Admitting: INTERNAL MEDICINE
Payer: MEDICARE

## 2019-10-22 DIAGNOSIS — Z79.01 LONG TERM (CURRENT) USE OF ANTICOAGULANTS: ICD-10-CM

## 2019-10-22 DIAGNOSIS — R93.1 ABNORMAL NUCLEAR CARDIAC IMAGING TEST: ICD-10-CM

## 2019-10-22 DIAGNOSIS — R94.39 ABNORMAL RESULT OF OTHER CARDIOVASCULAR FUNCTION STUDY: ICD-10-CM

## 2019-10-22 DIAGNOSIS — I25.10 ATHEROSCLEROTIC HEART DISEASE OF NATIVE CORONARY ARTERY WITHOUT ANGINA PECTORIS: ICD-10-CM

## 2019-10-22 DIAGNOSIS — I25.10 CAD (CORONARY ARTERY DISEASE): ICD-10-CM

## 2019-10-22 LAB
CORONARY STENOSIS: ABNORMAL
CORONARY STENT: YES
POC ACTIVATED CLOTTING TIME K: 186 SEC (ref 74–137)
POC ACTIVATED CLOTTING TIME K: 307 SEC (ref 74–137)
POC ACTIVATED CLOTTING TIME K: 313 SEC (ref 74–137)
POC ACTIVATED CLOTTING TIME K: 345 SEC (ref 74–137)
SAMPLE: ABNORMAL

## 2019-10-22 PROCEDURE — C1725 CATH, TRANSLUMIN NON-LASER: HCPCS

## 2019-10-22 PROCEDURE — 99152 MOD SED SAME PHYS/QHP 5/>YRS: CPT | Mod: ,,, | Performed by: INTERNAL MEDICINE

## 2019-10-22 PROCEDURE — 93567 NJX CAR CTH SPRVLV AORTGRPHY: CPT | Mod: 59,,, | Performed by: INTERNAL MEDICINE

## 2019-10-22 PROCEDURE — 25000003 PHARM REV CODE 250: Performed by: INTERNAL MEDICINE

## 2019-10-22 PROCEDURE — 93459 CATH LAB PROCEDURE: ICD-10-PCS | Mod: 26,59,51, | Performed by: INTERNAL MEDICINE

## 2019-10-22 PROCEDURE — 63600175 PHARM REV CODE 636 W HCPCS

## 2019-10-22 PROCEDURE — 93005 ELECTROCARDIOGRAM TRACING: CPT | Mod: 59

## 2019-10-22 PROCEDURE — 93010 EKG 12-LEAD: ICD-10-PCS | Mod: ,,, | Performed by: INTERNAL MEDICINE

## 2019-10-22 PROCEDURE — 99152 MOD SED SAME PHYS/QHP 5/>YRS: CPT

## 2019-10-22 PROCEDURE — 93459 L HRT ART/GRFT ANGIO: CPT | Mod: 26,59,51, | Performed by: INTERNAL MEDICINE

## 2019-10-22 PROCEDURE — 63600175 PHARM REV CODE 636 W HCPCS: Performed by: INTERNAL MEDICINE

## 2019-10-22 PROCEDURE — 25000003 PHARM REV CODE 250

## 2019-10-22 PROCEDURE — 92928 CATH LAB PROCEDURE: ICD-10-PCS | Mod: LC,,, | Performed by: INTERNAL MEDICINE

## 2019-10-22 PROCEDURE — 93010 ELECTROCARDIOGRAM REPORT: CPT | Mod: ,,, | Performed by: INTERNAL MEDICINE

## 2019-10-22 PROCEDURE — 25500020 PHARM REV CODE 255

## 2019-10-22 PROCEDURE — 92928 PRQ TCAT PLMT NTRAC ST 1 LES: CPT | Mod: LC,,, | Performed by: INTERNAL MEDICINE

## 2019-10-22 PROCEDURE — 99152 CATH LAB PROCEDURE: ICD-10-PCS | Mod: ,,, | Performed by: INTERNAL MEDICINE

## 2019-10-22 PROCEDURE — 93567 CATH LAB PROCEDURE: ICD-10-PCS | Mod: 59,,, | Performed by: INTERNAL MEDICINE

## 2019-10-22 RX ORDER — CLOPIDOGREL BISULFATE 75 MG/1
75 TABLET ORAL DAILY
Status: DISCONTINUED | OUTPATIENT
Start: 2019-10-23 | End: 2019-10-23 | Stop reason: HOSPADM

## 2019-10-22 RX ORDER — TIROFIBAN HYDROCHLORIDE 50 UG/ML
0.15 INJECTION INTRAVENOUS CONTINUOUS
Status: ACTIVE | OUTPATIENT
Start: 2019-10-22 | End: 2019-10-22

## 2019-10-22 RX ORDER — ESCITALOPRAM OXALATE 10 MG/1
10 TABLET ORAL DAILY
Status: DISCONTINUED | OUTPATIENT
Start: 2019-10-22 | End: 2019-10-23 | Stop reason: HOSPADM

## 2019-10-22 RX ORDER — DIPHENHYDRAMINE HCL 50 MG
50 CAPSULE ORAL ONCE
Status: COMPLETED | OUTPATIENT
Start: 2019-10-22 | End: 2019-10-22

## 2019-10-22 RX ORDER — SODIUM CHLORIDE 9 MG/ML
INJECTION, SOLUTION INTRAVENOUS CONTINUOUS
Status: DISCONTINUED | OUTPATIENT
Start: 2019-10-22 | End: 2019-10-22

## 2019-10-22 RX ORDER — DIAZEPAM 5 MG/1
5 TABLET ORAL
Status: DISCONTINUED | OUTPATIENT
Start: 2019-10-22 | End: 2019-10-22

## 2019-10-22 RX ORDER — CHOLECALCIFEROL (VITAMIN D3) 25 MCG
1000 TABLET ORAL DAILY
Status: DISCONTINUED | OUTPATIENT
Start: 2019-10-22 | End: 2019-10-23 | Stop reason: HOSPADM

## 2019-10-22 RX ORDER — SODIUM CHLORIDE 9 MG/ML
INJECTION, SOLUTION INTRAVENOUS CONTINUOUS
Status: ACTIVE | OUTPATIENT
Start: 2019-10-22 | End: 2019-10-22

## 2019-10-22 RX ORDER — CARVEDILOL 6.25 MG/1
6.25 TABLET ORAL 2 TIMES DAILY WITH MEALS
Status: DISCONTINUED | OUTPATIENT
Start: 2019-10-22 | End: 2019-10-23 | Stop reason: HOSPADM

## 2019-10-22 RX ORDER — TIROFIBAN HYDROCHLORIDE 50 UG/ML
25 INJECTION INTRAVENOUS ONCE
Status: COMPLETED | OUTPATIENT
Start: 2019-10-22 | End: 2019-10-22

## 2019-10-22 RX ORDER — ONDANSETRON 8 MG/1
8 TABLET, ORALLY DISINTEGRATING ORAL EVERY 8 HOURS PRN
Status: DISCONTINUED | OUTPATIENT
Start: 2019-10-22 | End: 2019-10-23 | Stop reason: HOSPADM

## 2019-10-22 RX ORDER — ACETAMINOPHEN 325 MG/1
650 TABLET ORAL EVERY 6 HOURS PRN
Status: DISCONTINUED | OUTPATIENT
Start: 2019-10-22 | End: 2019-10-23 | Stop reason: HOSPADM

## 2019-10-22 RX ORDER — NAPROXEN SODIUM 220 MG/1
81 TABLET, FILM COATED ORAL ONCE
Status: COMPLETED | OUTPATIENT
Start: 2019-10-22 | End: 2019-10-22

## 2019-10-22 RX ORDER — ASPIRIN 81 MG/1
81 TABLET ORAL DAILY
Status: DISCONTINUED | OUTPATIENT
Start: 2019-10-23 | End: 2019-10-23 | Stop reason: HOSPADM

## 2019-10-22 RX ORDER — ATORVASTATIN CALCIUM 10 MG/1
20 TABLET, FILM COATED ORAL DAILY
Status: DISCONTINUED | OUTPATIENT
Start: 2019-10-22 | End: 2019-10-23 | Stop reason: HOSPADM

## 2019-10-22 RX ORDER — OXYCODONE HYDROCHLORIDE 5 MG/1
5 TABLET ORAL EVERY 4 HOURS PRN
Status: DISCONTINUED | OUTPATIENT
Start: 2019-10-22 | End: 2019-10-23 | Stop reason: HOSPADM

## 2019-10-22 RX ADMIN — TIROFIBAN HYDROCHLORIDE 0.15 MCG/KG/MIN: 50 INJECTION INTRAVENOUS at 10:10

## 2019-10-22 RX ADMIN — CARVEDILOL 6.25 MG: 6.25 TABLET, FILM COATED ORAL at 05:10

## 2019-10-22 RX ADMIN — SODIUM CHLORIDE: 0.9 INJECTION, SOLUTION INTRAVENOUS at 11:10

## 2019-10-22 RX ADMIN — SODIUM CHLORIDE: 0.9 INJECTION, SOLUTION INTRAVENOUS at 05:10

## 2019-10-22 RX ADMIN — TIROFIBAN HYDROCHLORIDE 2405 MCG: 50 INJECTION INTRAVENOUS at 10:10

## 2019-10-22 RX ADMIN — NAPROXEN SODIUM 81 MG: 220 TABLET, FILM COATED ORAL at 08:10

## 2019-10-22 RX ADMIN — Medication 50 MG: at 08:10

## 2019-10-22 RX ADMIN — ESCITALOPRAM OXALATE 10 MG: 10 TABLET ORAL at 01:10

## 2019-10-22 RX ADMIN — ATORVASTATIN CALCIUM 20 MG: 10 TABLET, FILM COATED ORAL at 01:10

## 2019-10-22 RX ADMIN — SODIUM CHLORIDE: 9 INJECTION, SOLUTION INTRAVENOUS at 08:10

## 2019-10-22 RX ADMIN — DIAZEPAM 5 MG: 5 TABLET ORAL at 08:10

## 2019-10-22 NOTE — PLAN OF CARE
Discussed Plan of Care with patient and verbalized understanding - Patient remains AAOx4 - remains free of falls, accidents and trauma during the day shift. Bed is in the low position and the call light is within reach. Probable discharge on Wednesday. Will continue to monitor

## 2019-10-22 NOTE — PROGRESS NOTES
Pharmacy Brief Progress Note:    Patient/caregiver educated on warfarin indication, side effects, and drug interactions. Discussed importance of medication compliance and INR monitoring and reviewed signs of abnormal bleeding. Patient/caregiver given warfarin educational handout. Patient/caregiver expressed understanding and had no further questions.    Thank you for allowing us to participate in this patient's care.     Sofia Barnes 10/22/2019 2:14 PM

## 2019-10-22 NOTE — DISCHARGE INSTRUCTIONS
"Post-op Heart Catheterization    1. DIET: It is advisable for you to follow a diet that limits the intake of salt, sugar, saturated fats and cholesterol.     2. DRIVING: Due to sedation you received during your procedure, DO NOT drive or operate machinery for 24 hours. Avoid making critical decisions or signing legal documents until tomorrow.    3. ACTIVITY: AVOID activities that require bending of the affected arm/wrist for 3 days and submerging the site in water for 3 days. REMOVE the dressing the day after  the procedure, and shower.  Apply a bandaid to site after shower.  WEAR wrist immobilizer until tomorrow night.    You may RESUME your normal activities or prescribed exercise program as instructed by your physician after 3 days.                                                                                                                 4. WOUND CARE: It is not unusual to have a small amount of bruising to appear at or near the puncture site. It is also common to have a tender "knot" develop beneath the skin at the puncture site of the wrist/arm. This is usually scar tissue and is not a cause for concern or alarm. This tender knot may take several weeks to fully resolve. The bruise will usually spread over several days. However, if the lump gets bigger, call your doctor immediately.    5. DISCOMFORT: For general discomfort at the puncture site, you may take 1 or 2 Acetaminophen (Tylenol) tablets every 4 - 6 hours as needed. (Do not take more than 4000 mg a day)    6. SIGNS AND SYMPTOMS TO REPORT:  Call your physician immediately if any of the following occur:                                            1. Loss of feeling, warmth or color to the affected arm/wrist                                                                                                            2. Mild beeding from the site                 3. Pain that is sudden, sharp or persistent in the affected arm/wrist                 4. " "Swelling or a change in "lump" size, increased redness or drainage at the puncture site                                                                               5. High fever (101 degrees or higher)    7. GO TO  THE EMERGENCY ROOM OR CALL 911 IF YOU HAVE: Chest pains or discomforts not relieved with 3 nitroglycerin doses (sublingual tablets or spray), numbness or severe pain of limb, if your limb becomes cold or discolored or if you develop uncontrolled bleeding from the puncture site (quickly apply firm, direct pressure above the site).    "

## 2019-10-22 NOTE — INTERVAL H&P NOTE
The patient has been examined and the H&P has been reviewed:    I concur with the findings and no changes have occurred since H&P was written.  High risk cardiolite for lhc/ptca  Anesthesia/Surgery risks, benefits and alternative options discussed and understood by patient/family.  I have explained the risks, benefits , and alternatives of the procedure in detail.the patient voices understanding and all questions have been answered.the patient agrees to proceed as planned.      Active Hospital Problems    Diagnosis  POA    Abnormal nuclear cardiac imaging test [R93.1]  Yes     Priority: High      Resolved Hospital Problems   No resolved problems to display.

## 2019-10-22 NOTE — PLAN OF CARE
L TR band removed and pressure dressing applied. Dressing remains c/d/i and wnl at time of transfer to tele.   Transferred to tele, via stretcher, in no apparent distress. All personal belongings taken with pt and family.   Bedside report given to Bandar Patino RN; no further questions at this time.

## 2019-10-22 NOTE — PROGRESS NOTES
Clinical Pharmacy Progress Note: Discharge Medication Education     Patient was counseled by pharmacist on new medication Plavix and its indications, side effects, and reasons for taking this medication.   Patient was given educational drug card/handout.   Patient/caregiver expressed understanding and had no further questions.  Offered bedside delivery of medications to patient.     Please note: Patient's wife requests 1 month supply Plavix sent to [facility] on [address] in [place] and further 3 month supplies sent to [facility] mail-order pharmacy.     Thank you for allowing us to participate in this patient's care.    Sofia Barnes, PharmD/RPh 10/22/2019 2:13 PM

## 2019-10-22 NOTE — PROGRESS NOTES
Received patient for care via stretcher from [facility] - report given by Benson MONTALVO - full assessment completed, monitor placed on patient, called monitor tech to verify monitor, vital signs taken - left wrist dressing clean, dry and intact without drainage noted and splint in place.

## 2019-10-22 NOTE — OP NOTE
INPATIENT Operative Note         SUMMARY     Surgery Date: 10/22/2019     Surgeon(s) and Role:     * Henna Raza MD - Primary    ASSISTANT:none    Pre-op Diagnosis:  Abnormal stress test      Post-op Diagnosis: abnormal strress test    Procedure(s) (LRB):  CATHETERIZATION, HEART, LEFT (Left)  Graft angio   Stent lcx.  COMPLICATION:none    Anesthesia: RN IV Sedation    Findings/Key Components:  lcx prox 70% mid 90% treated with sandra resolute dev 3.0x30 post dilated with 3.25 at 20 ronak.  Lad normal   d1 occluded   svg to d1 patent .  svg to om ramus rca occluded.  Ef 50% inf hk.  Estimated Blood Loss: < 50 ML.         SPECIMEN: NONE    Devices/Prostetics: resolute 3.0x30     PLAN:  Routine post stent care.

## 2019-10-23 ENCOUNTER — TELEPHONE (OUTPATIENT)
Dept: CARDIOLOGY | Facility: CLINIC | Age: 74
End: 2019-10-23

## 2019-10-23 VITALS
HEIGHT: 69 IN | OXYGEN SATURATION: 95 % | HEART RATE: 73 BPM | TEMPERATURE: 97 F | SYSTOLIC BLOOD PRESSURE: 145 MMHG | RESPIRATION RATE: 20 BRPM | BODY MASS INDEX: 31.02 KG/M2 | WEIGHT: 209.44 LBS | DIASTOLIC BLOOD PRESSURE: 79 MMHG

## 2019-10-23 LAB
ANION GAP SERPL CALC-SCNC: 9 MMOL/L (ref 8–16)
BASOPHILS # BLD AUTO: 0.07 K/UL (ref 0–0.2)
BASOPHILS NFR BLD: 0.7 % (ref 0–1.9)
BUN SERPL-MCNC: 9 MG/DL (ref 8–23)
CALCIUM SERPL-MCNC: 9.1 MG/DL (ref 8.7–10.5)
CHLORIDE SERPL-SCNC: 102 MMOL/L (ref 95–110)
CO2 SERPL-SCNC: 26 MMOL/L (ref 23–29)
CREAT SERPL-MCNC: 0.7 MG/DL (ref 0.5–1.4)
DIFFERENTIAL METHOD: ABNORMAL
EOSINOPHIL # BLD AUTO: 0.4 K/UL (ref 0–0.5)
EOSINOPHIL NFR BLD: 3.8 % (ref 0–8)
ERYTHROCYTE [DISTWIDTH] IN BLOOD BY AUTOMATED COUNT: 13.2 % (ref 11.5–14.5)
EST. GFR  (AFRICAN AMERICAN): >60 ML/MIN/1.73 M^2
EST. GFR  (NON AFRICAN AMERICAN): >60 ML/MIN/1.73 M^2
GLUCOSE SERPL-MCNC: 97 MG/DL (ref 70–110)
HCT VFR BLD AUTO: 42.4 % (ref 40–54)
HGB BLD-MCNC: 14.3 G/DL (ref 14–18)
IMM GRANULOCYTES # BLD AUTO: 0.03 K/UL (ref 0–0.04)
IMM GRANULOCYTES NFR BLD AUTO: 0.3 % (ref 0–0.5)
LYMPHOCYTES # BLD AUTO: 3.1 K/UL (ref 1–4.8)
LYMPHOCYTES NFR BLD: 29.6 % (ref 18–48)
MCH RBC QN AUTO: 32 PG (ref 27–31)
MCHC RBC AUTO-ENTMCNC: 33.7 G/DL (ref 32–36)
MCV RBC AUTO: 95 FL (ref 82–98)
MONOCYTES # BLD AUTO: 1 K/UL (ref 0.3–1)
MONOCYTES NFR BLD: 9.9 % (ref 4–15)
NEUTROPHILS # BLD AUTO: 5.9 K/UL (ref 1.8–7.7)
NEUTROPHILS NFR BLD: 55.7 % (ref 38–73)
NRBC BLD-RTO: 0 /100 WBC
PLATELET # BLD AUTO: 212 K/UL (ref 150–350)
PMV BLD AUTO: 8.8 FL (ref 9.2–12.9)
POTASSIUM SERPL-SCNC: 4.5 MMOL/L (ref 3.5–5.1)
RBC # BLD AUTO: 4.47 M/UL (ref 4.6–6.2)
SODIUM SERPL-SCNC: 137 MMOL/L (ref 136–145)
WBC # BLD AUTO: 10.49 K/UL (ref 3.9–12.7)

## 2019-10-23 PROCEDURE — 80048 BASIC METABOLIC PNL TOTAL CA: CPT

## 2019-10-23 PROCEDURE — 99217 PR OBSERVATION CARE DISCHARGE: ICD-10-PCS | Mod: ,,, | Performed by: INTERNAL MEDICINE

## 2019-10-23 PROCEDURE — 25000003 PHARM REV CODE 250: Performed by: INTERNAL MEDICINE

## 2019-10-23 PROCEDURE — 93005 ELECTROCARDIOGRAM TRACING: CPT

## 2019-10-23 PROCEDURE — 85025 COMPLETE CBC W/AUTO DIFF WBC: CPT

## 2019-10-23 PROCEDURE — 93010 ELECTROCARDIOGRAM REPORT: CPT | Mod: ,,, | Performed by: INTERNAL MEDICINE

## 2019-10-23 PROCEDURE — 93010 EKG 12-LEAD: ICD-10-PCS | Mod: ,,, | Performed by: INTERNAL MEDICINE

## 2019-10-23 PROCEDURE — 99217 PR OBSERVATION CARE DISCHARGE: CPT | Mod: ,,, | Performed by: INTERNAL MEDICINE

## 2019-10-23 PROCEDURE — 36415 COLL VENOUS BLD VENIPUNCTURE: CPT

## 2019-10-23 RX ORDER — CLOPIDOGREL BISULFATE 75 MG/1
75 TABLET ORAL DAILY
Qty: 30 TABLET | Refills: 1 | Status: SHIPPED | OUTPATIENT
Start: 2019-10-24 | End: 2020-01-21 | Stop reason: SDUPTHER

## 2019-10-23 RX ORDER — ASPIRIN 81 MG/1
81 TABLET ORAL DAILY
Refills: 0 | COMMUNITY
Start: 2019-10-24 | End: 2020-09-03 | Stop reason: ALTCHOICE

## 2019-10-23 RX ORDER — WARFARIN 2.5 MG/1
TABLET ORAL
Qty: 104 TABLET | Refills: 3 | Status: SHIPPED | OUTPATIENT
Start: 2019-10-24

## 2019-10-23 RX ORDER — ATORVASTATIN CALCIUM 20 MG/1
40 TABLET, FILM COATED ORAL NIGHTLY
Qty: 30 TABLET | Refills: 1 | Status: SHIPPED | OUTPATIENT
Start: 2019-10-23

## 2019-10-23 RX ADMIN — ATORVASTATIN CALCIUM 20 MG: 10 TABLET, FILM COATED ORAL at 08:10

## 2019-10-23 RX ADMIN — CARVEDILOL 6.25 MG: 6.25 TABLET, FILM COATED ORAL at 08:10

## 2019-10-23 RX ADMIN — ASPIRIN 81 MG: 81 TABLET, COATED ORAL at 08:10

## 2019-10-23 RX ADMIN — MELATONIN 1000 UNITS: at 08:10

## 2019-10-23 RX ADMIN — CLOPIDOGREL BISULFATE 75 MG: 75 TABLET ORAL at 08:10

## 2019-10-23 RX ADMIN — ESCITALOPRAM OXALATE 10 MG: 10 TABLET ORAL at 08:10

## 2019-10-23 NOTE — PLAN OF CARE
Patient AAOx4.  PIV clean dry and intact.  Cath site clean dry and intact. No signs of hematoma or active bleeding.  No falls this shift.   Will continue to monitor.

## 2019-10-23 NOTE — HPI
Jethro Pitts is a 74 year old male who presented to [facility] for elective LHC per Dr. Raza due to abnormal nuclear stress test. LHC revealed LCX proximal 70%, mid 90% treated with JAYLENE onxy, LAD normal, D1 occluded, SVG-D1 patent, SVG to OM Ramus RCA occluded, EF 50%, inferior hypokinesis. He was placed in outpatient recovery and had stable overnight course. Patient seen and examined this AM. Left radial access site C/D/I, no hematoma or ecchymosis noted. Reviewed post procedure restrictions in detail with patient. He is aware to resume Coumadin tomorrow for CVA prophylaxis. He was educated regarding ASA, Plavix and Coumadin for the next month then we will discuss stopping ASA as OP. Will discuss adding ACEi/ARB as OP if BP permits. Will follow up in clinic next week.

## 2019-10-23 NOTE — TELEPHONE ENCOUNTER
----- Message from QUOC Muller sent at 10/23/2019 11:14 AM CDT -----  Patient discharged home today    Needs hospital follow up with me or Donna in next 1-2 weeks    Please arrange  Thanks

## 2019-10-23 NOTE — ASSESSMENT & PLAN NOTE
S/P LHC with PCI of LCX with [place] JAYLENE per Dr. Raza  OK to discharge home today with close cardiology follow up  Resume Coumadin tomorrow, discussed with patient and wife in detail.   Continue ASA, Statin, Plavix, BB  Consider addition of ACEi/ARB as OP if BP permits  Increase Lipitor to 40 mg nightly  Clinic will call to arrange follow up next week

## 2019-10-23 NOTE — DISCHARGE SUMMARY
[facility] -   Cardiology  Discharge Summary      Patient Name: Jethro Pitts  MRN: [id number]  Admission Date: 10/22/2019  Hospital Length of Stay: 0 days  Discharge Date and Time:  10/23/2019 11:13 AM  Attending Physician: Henna Raza MD    Discharging Provider: QUOC Larios  Primary Care Physician: Marisol Salgado MD    HPI:   Jethro Pitts is a 74 year old male who presented to [facility] for elective LHC per Dr. Raza due to abnormal nuclear stress test. LHC revealed LCX proximal 70%, mid 90% treated with JAYLENE onxy, LAD normal, D1 occluded, SVG-D1 patent, SVG to OM Ramus RCA occluded, EF 50%, inferior hypokinesis. He was placed in outpatient recovery and had stable overnight course. Patient seen and examined this AM. Left radial access site C/D/I, no hematoma or ecchymosis noted. Reviewed post procedure restrictions in detail with patient. He is aware to resume Coumadin tomorrow for CVA prophylaxis. He was educated regarding ASA, Plavix and Coumadin for the next month then we will discuss stopping ASA as OP. Will discuss adding ACEi/ARB as OP if BP permits. Will follow up in clinic next week.     Procedure(s) (LRB):  CATHETERIZATION, HEART, LEFT (Left)     Indwelling Lines/Drains at time of discharge:  Lines/Drains/Airways     None                 Hospital Course:  No notes on file    Consults:     Significant Diagnostic Studies: Labs:   BMP:   Recent Labs   Lab 10/23/19  0437   GLU 97      K 4.5      CO2 26   BUN 9   CREATININE 0.7   CALCIUM 9.1   , CBC   Recent Labs   Lab 10/23/19  0437   WBC 10.49   HGB 14.3   HCT 42.4       and All labs within the past 24 hours have been reviewed    Pending Diagnostic Studies:     None          Final Active Diagnoses:    Diagnosis Date Noted POA    PRINCIPAL PROBLEM:  Abnormal nuclear cardiac imaging test [R93.1] 10/22/2019 Yes      Problems Resolved During this Admission:     * Abnormal nuclear cardiac imaging test  S/P LHC with  PCI of LCX with Edwin JAYLENE per Dr. Raza  OK to discharge home today with close cardiology follow up  Resume Coumadin tomorrow, discussed with patient and wife in detail.   Continue ASA, Statin, Plavix, BB  Consider addition of ACEi/ARB as OP if BP permits  Increase Lipitor to 40 mg nightly  Clinic will call to arrange follow up next week          Discharged Condition: good    Disposition: Home or Self Care    Follow Up:  Follow-up Information     Donna Veronica PA-C In 1 week.    Specialty:  Cardiology  Why:  For wound re-check  Contact information:  [address] [place] [address] DR Priscilla PARKINSON [id number] [phone]                 Patient Instructions:      Diet Cardiac     Other restrictions (specify):   Order Comments: Do not submerge left wrist in dirty soapy water for next 3 days  No heavy lifting with left wrist for 3-5 days     Notify your health care provider if you experience any of the following:  temperature >100.4     Notify your health care provider if you experience any of the following:  severe uncontrolled pain     Notify your health care provider if you experience any of the following:  redness, tenderness, or signs of infection (pain, swelling, redness, odor or green/yellow discharge around incision site)     Notify your health care provider if you experience any of the following:  persistent dizziness, light-headedness, or visual disturbances     No dressing needed     Activity as tolerated     Medications:  Reconciled Home Medications:      Medication List      START taking these medications    aspirin 81 MG EC tablet  Commonly known as:  ECOTRIN  Take 1 tablet (81 mg total) by mouth once daily.  Start taking on:  October 24, 2019     clopidogrel 75 mg tablet  Commonly known as:  PLAVIX  Take 1 tablet (75 mg total) by mouth once daily.  Start taking on:  October 24, 2019        CHANGE how you take these medications    atorvastatin 20 MG tablet  Commonly known as:  LIPITOR  Take 2 tablets (40 mg total)  by mouth every evening.  What changed:    · how much to take  · when to take this        CONTINUE taking these medications    carvedilol 6.25 MG tablet  Commonly known as:  COREG  TAKE 1 TABLET IN THE MORNING  AND TAKE 2 TABLETS AT NIGHT     escitalopram oxalate 10 MG tablet  Commonly known as:  LEXAPRO  Take 10 mg by mouth once daily.     fish oil-omega-3 fatty acids 300-1,000 mg capsule  Take 1,290 g by mouth once daily.     vitamin D 1000 units Tab  Commonly known as:  VITAMIN D3  Take 185 mg by mouth.     warfarin 2.5 MG tablet  Commonly known as:  COUMADIN  Take 1.5 tablets by mouth on Sundays; and 1 tablet on all other days.  Start taking on:  October 24, 2019            Time spent on the discharge of patient: 25 minutes    CAREY Larios-CELINA  Cardiology  [facility] -

## 2019-10-23 NOTE — PROGRESS NOTES
Discharge instructions given to patient, verbalized understanding - IV removed without difficulty, pressure dressing applied to site - cardiac monitor removed and returned to monitor tech.

## 2019-10-29 ENCOUNTER — ANTI-COAG VISIT (OUTPATIENT)
Dept: CARDIOLOGY | Facility: CLINIC | Age: 74
End: 2019-10-29
Payer: MEDICARE

## 2019-10-29 DIAGNOSIS — I48.91 ATRIAL FIBRILLATION WITH RVR: ICD-10-CM

## 2019-10-29 DIAGNOSIS — Z79.01 LONG TERM (CURRENT) USE OF ANTICOAGULANTS: ICD-10-CM

## 2019-10-29 LAB — INR PPP: 1.2

## 2019-10-29 PROCEDURE — 93793 ANTICOAG MGMT PT WARFARIN: CPT | Mod: ,,,

## 2019-10-29 PROCEDURE — 93793 PR ANTICOAGULANT MGMT FOR PT TAKING WARFARIN: ICD-10-PCS | Mod: ,,,

## 2019-10-29 NOTE — PROGRESS NOTES
Fax results received from [facility].  INR:  1.2.  Date drawn 10/29/2019. Patient contacted.  Mr. Pitts had a LHC on 10/22.  Coumadin was held for 6 days.  Patient advised to notify coumadin clinic with any upcoming procedures.  Instructions given for patient to take warfarin 5mg on today; then resume current dose of warfarin 3.75mg on Sundays and 2.5mg on all other days of the week.   Patient repeated instructions and voiced understanding.  Recheck INR in 1 week.

## 2019-11-05 ENCOUNTER — ANTI-COAG VISIT (OUTPATIENT)
Dept: CARDIOLOGY | Facility: CLINIC | Age: 74
End: 2019-11-05
Payer: MEDICARE

## 2019-11-05 DIAGNOSIS — Z79.01 LONG TERM (CURRENT) USE OF ANTICOAGULANTS: ICD-10-CM

## 2019-11-05 DIAGNOSIS — I48.91 ATRIAL FIBRILLATION WITH RVR: ICD-10-CM

## 2019-11-05 LAB — INR PPP: 2.5

## 2019-11-05 PROCEDURE — G0250 PR MD REVIEW INTERPRET OF TEST: ICD-10-PCS | Mod: ,,, | Performed by: INTERNAL MEDICINE

## 2019-11-05 PROCEDURE — G0250 MD INR TEST REVIE INTER MGMT: HCPCS | Mod: ,,, | Performed by: INTERNAL MEDICINE

## 2019-11-05 NOTE — PROGRESS NOTES
Fax received from [facility]:  INR therapeutic at 2.5.  Patient contacted to maintain current dose of Warfarin 3.75 mg every Sunday and 2.5 mg on all other days per week (Monday through Saturday).  Recheck INR in 2 weeks.

## 2019-11-08 ENCOUNTER — OFFICE VISIT (OUTPATIENT)
Dept: CARDIOLOGY | Facility: CLINIC | Age: 74
End: 2019-11-08
Payer: MEDICARE

## 2019-11-08 VITALS
WEIGHT: 209.19 LBS | SYSTOLIC BLOOD PRESSURE: 138 MMHG | BODY MASS INDEX: 30.98 KG/M2 | DIASTOLIC BLOOD PRESSURE: 85 MMHG | HEART RATE: 73 BPM | HEIGHT: 69 IN

## 2019-11-08 DIAGNOSIS — F10.10 ETOH ABUSE: Chronic | ICD-10-CM

## 2019-11-08 DIAGNOSIS — I50.23 ACUTE ON CHRONIC SYSTOLIC CONGESTIVE HEART FAILURE: ICD-10-CM

## 2019-11-08 DIAGNOSIS — R93.1 ABNORMAL ECHOCARDIOGRAM: ICD-10-CM

## 2019-11-08 DIAGNOSIS — Z79.01 ON COUMADIN FOR ATRIAL FIBRILLATION: ICD-10-CM

## 2019-11-08 DIAGNOSIS — G47.33 OBSTRUCTIVE SLEEP APNEA SYNDROME: Chronic | ICD-10-CM

## 2019-11-08 DIAGNOSIS — R93.1 ABNORMAL NUCLEAR CARDIAC IMAGING TEST: ICD-10-CM

## 2019-11-08 DIAGNOSIS — Z95.5 S/P CORONARY ARTERY STENT PLACEMENT: Primary | ICD-10-CM

## 2019-11-08 DIAGNOSIS — I48.91 ON COUMADIN FOR ATRIAL FIBRILLATION: ICD-10-CM

## 2019-11-08 DIAGNOSIS — Z95.1 S/P CABG (CORONARY ARTERY BYPASS GRAFT): ICD-10-CM

## 2019-11-08 DIAGNOSIS — I25.2 OLD MI (MYOCARDIAL INFARCTION): ICD-10-CM

## 2019-11-08 DIAGNOSIS — Z98.61 S/P PTCA (PERCUTANEOUS TRANSLUMINAL CORONARY ANGIOPLASTY): ICD-10-CM

## 2019-11-08 DIAGNOSIS — I48.0 PAROXYSMAL A-FIB: ICD-10-CM

## 2019-11-08 PROCEDURE — 99214 OFFICE O/P EST MOD 30 MIN: CPT | Mod: S$PBB,,, | Performed by: PHYSICIAN ASSISTANT

## 2019-11-08 PROCEDURE — 99999 PR PBB SHADOW E&M-EST. PATIENT-LVL IV: ICD-10-PCS | Mod: PBBFAC,,, | Performed by: PHYSICIAN ASSISTANT

## 2019-11-08 PROCEDURE — 99999 PR PBB SHADOW E&M-EST. PATIENT-LVL IV: CPT | Mod: PBBFAC,,, | Performed by: PHYSICIAN ASSISTANT

## 2019-11-08 PROCEDURE — 99214 OFFICE O/P EST MOD 30 MIN: CPT | Mod: PBBFAC | Performed by: PHYSICIAN ASSISTANT

## 2019-11-08 PROCEDURE — 99214 PR OFFICE/OUTPT VISIT, EST, LEVL IV, 30-39 MIN: ICD-10-PCS | Mod: S$PBB,,, | Performed by: PHYSICIAN ASSISTANT

## 2019-11-11 PROBLEM — Z95.5 S/P CORONARY ARTERY STENT PLACEMENT: Status: ACTIVE | Noted: 2019-11-11

## 2019-11-11 NOTE — PROGRESS NOTES
Subjective:    Patient ID:  Jethro Pitts is a 74 y.o. male who presents for follow-up of Hospital Follow Up      HPI  Mr. Pitts is a 74 year old male patient whose current medical conditions include atrial fibrillation, atrial flutter (previoulsy on Eliquis, now on Coumadin s/p successful PVI and AFL ablation 5/24/16 by Dr. Villafuerte) systolic CHF, cardiomyopathy, CAD s/p CABG, old MI, HTN, and hyperlipidemia who presents today for hospital follow-up. Patient recently underwent LHC by Dr. Raza on 10/22/19 for abnormal MPI stress test with subsequent successful PCI of LCX. He returns today and states he is doing well. No cardiac complaints. Denies any chest pain, SOB, or other anginal signs or symptoms. No lightheadedness, dizziness, palpitations, near syncope, or syncope. No s/s suggestive of volume overload. BP controlled. Patient reports compliance with his medications. Currently on ASA, Plavix, and Coumadin. Followed by Coumadin clinic. No s/s of TIA/CVA.      Review of Systems   Constitution: Negative for chills, decreased appetite, fever and malaise/fatigue.   HENT: Negative for congestion, hoarse voice and sore throat.    Eyes: Negative for blurred vision and discharge.   Cardiovascular: Negative for chest pain, claudication, cyanosis, dyspnea on exertion, irregular heartbeat, leg swelling, near-syncope, orthopnea, palpitations and paroxysmal nocturnal dyspnea.   Respiratory: Negative for cough, hemoptysis, shortness of breath, snoring, sputum production and wheezing.    Endocrine: Negative for cold intolerance and heat intolerance.   Hematologic/Lymphatic: Negative for bleeding problem. Does not bruise/bleed easily.   Skin: Negative for rash.   Musculoskeletal: Negative for arthritis, back pain, joint pain, joint swelling, muscle cramps, muscle weakness and myalgias.   Gastrointestinal: Negative for abdominal pain, constipation, diarrhea, heartburn, melena and nausea.   Genitourinary: Negative for hematuria.  "  Neurological: Negative for dizziness, focal weakness, headaches, light-headedness, loss of balance, numbness, paresthesias, seizures and weakness.   Psychiatric/Behavioral: Negative for memory loss. The patient does not have insomnia.    Allergic/Immunologic: Negative for hives.     /85 (BP Location: Right arm)   Pulse 73   Ht 5' 9" (1.753 m)   Wt 94.9 kg (209 lb 3.5 oz)   BMI 30.90 kg/m²     Objective:    Physical Exam   Constitutional: He is oriented to person, place, and time. He appears well-developed and well-nourished. No distress.   HENT:   Head: Normocephalic and atraumatic.   Eyes: Pupils are equal, round, and reactive to light. Right eye exhibits no discharge. Left eye exhibits no discharge.   Neck: Neck supple. No JVD present.   Cardiovascular: Normal rate, regular rhythm, S1 normal, S2 normal and normal heart sounds.   No murmur heard.  Pulmonary/Chest: Effort normal and breath sounds normal. No respiratory distress. He has no wheezes. He has no rales.   Abdominal: Soft. He exhibits no distension.   Musculoskeletal: He exhibits no edema.   Neurological: He is alert and oriented to person, place, and time.   Skin: Skin is warm and dry. He is not diaphoretic. No erythema.   Left radial access site C/D/I; no bleeding erythema or drainage   Psychiatric: He has a normal mood and affect. His behavior is normal.   Nursing note and vitals reviewed.    E. Angiographic Results     Diagnostic:          Ramus artery was present.        - Left Main Coronary Artery:             The LM is normal. There is BRET 3 flow.     - Left Anterior Descending Artery:             The LAD has luminal irregularities. There is BRET 3 flow. the d1 is occluded.     - Left Circumflex Artery:             The proximal LCX has a 70% stenosis. There is BRET 3 flow.                     Lesion Details:   The length is 10-20 mm, eccentric shape, moderate proximal tortuosity, segment angulation of 45-90 degrees, smooth contour, " moderate to heavy calcification.             The mid LCX has a 90% stenosis. There is BRET 3 flow.                     Lesion Details:   The length is 10-20 mm, eccentric shape, moderate proximal tortuosity, segment angulation of 45-90 degrees, irregular contour, mild to moderate calcification.     - Right Coronary Artery:             The RCA has chronic total occlusion. There is BRET 0 flow.     - Aortic Root:             The Aortic Root is normal. There is BRET 3 flow. normal aorta with occluded grafts.     - SVG To RAMUS:             The SVG to RAMUS has chronic total occlusion. There is BRET 0 flow.     - SVG To D1:             The SVG to D1 is normal. There is BRET 3 flow.     - Y graft SVG to RCA and OM1:             The SVG to RCA has chronic total occlusion. There is BRET 0 flow.             The SVG to OM1 has chronic total occlusion. There is BRET 0 flow.     - Left Subclavian Artery:             The LSUBCL is normal.     - Left Internal Mammary Artery:             The left int mammary is normal. is free not attached to coronaries.      Intervention          Proximal LCX:              The lesion was successfully intervened. Post-stenosis of 0% and post-BRET 3 flow. The vessel was accessed natively.  The following items were used: Blln [place] 3.0 X 20, Blln Quantum 3.0 X 15, Cath Sapph Nc Plus Nc 3.0x15, Stent [place] 3.0 X 30   (JAYLENE) and Blln Quantum 3.25 X 15.       Mid LCX:              The lesion was successfully intervened. Post-stenosis of 0% and post-BRET 3 flow. The vessel was accessed natively.  The following items were used: Blln [place] 3.0 X 20, Stent Bixby 3.0 X 30 (JAYLENE) and Blln Quantum 3.25 X 15.  Assessment:       1. S/P coronary artery stent placement    2. ETOH abuse    3. Abnormal nuclear cardiac imaging test    4. Acute on chronic systolic congestive heart failure    5. Old MI (myocardial infarction)    6. S/P CABG (coronary artery bypass graft)    7. S/P PTCA (percutaneous transluminal coronary  angioplasty)    8. Obstructive sleep apnea syndrome    9. On Coumadin for atrial fibrillation    10. Abnormal echocardiogram    11. Paroxysmal A-fib      Patient presents for f/u. Doing clinically well s/p stent placement. No complaints. Denies angina/anginal equivalent. No bleeding issues. INR monitored by Coumadin clinic. Last INR 11/5 therapeutic. Continue same meds/mgmt. Explained will continue ASA, Plavix, and Coumadin x 6 weeks and then transition to Coumadin and Plavix.   Plan:   -Continue current medical management and risk factor modification  -Cardiac, low salt diet  -Increase activity level  -Will plan to continue ASA, Plavix, and Coumadin x 6 weeks and then transition to Coumadin and Plavix  -RT 3 [facility]

## 2019-11-26 ENCOUNTER — TELEPHONE (OUTPATIENT)
Dept: CARDIOLOGY | Facility: CLINIC | Age: 74
End: 2019-11-26

## 2019-11-27 ENCOUNTER — TELEPHONE (OUTPATIENT)
Dept: CARDIOLOGY | Facility: CLINIC | Age: 74
End: 2019-11-27

## 2019-11-27 NOTE — TELEPHONE ENCOUNTER
Returned call to patient's wife Sandra--informed that requested information has been faxed to [facility] [employer] at [phone]--Sandra verbalizes understanding

## 2019-11-27 NOTE — TELEPHONE ENCOUNTER
----- Message from Mamie Hinkle sent at 11/27/2019 11:10 AM CST -----  Type:  Needs Medical Advice    Who Called:  Pt wife (Asndra)  Symptoms (please be specific):   Changing ins co to [facility]   How long has patient had these symptoms:    Pharmacy name and phone #:    Would the patient rather a call back or a response via [facility]?  Call back  Best Call Back Number:   [phone]  Additional Information:   States [facility] has been trying to get in touch with the Dr office//pt has an INR machine at home from [facility]//[facility] is needing Clinical notes and a prescription from the //fax is [phone] and the provider number is [phone]//if any questions please call her//nohemy/[email or web address]

## 2019-12-03 ENCOUNTER — ANTI-COAG VISIT (OUTPATIENT)
Dept: CARDIOLOGY | Facility: CLINIC | Age: 74
End: 2019-12-03
Payer: MEDICARE

## 2019-12-03 DIAGNOSIS — Z79.01 LONG TERM (CURRENT) USE OF ANTICOAGULANTS: ICD-10-CM

## 2019-12-03 DIAGNOSIS — I48.0 PAROXYSMAL A-FIB: ICD-10-CM

## 2019-12-03 LAB — INR PPP: 3

## 2019-12-03 PROCEDURE — 93793 PR ANTICOAGULANT MGMT FOR PT TAKING WARFARIN: ICD-10-PCS | Mod: ,,,

## 2019-12-03 PROCEDURE — 93793 ANTICOAG MGMT PT WARFARIN: CPT | Mod: ,,,

## 2019-12-03 NOTE — PROGRESS NOTES
Fax received from [facility] [facility]:  INR therapeutic at 3.0.  Tested date:  12/03/2019.  Patient contacted to maintain current dose of Warfarin 3.75 mg every Sunday and 2.5 mg on all other days per week (Monday through Saturday).  Recheck INR in 2 weeks.  Patient voiced understanding.

## 2019-12-17 ENCOUNTER — ANTI-COAG VISIT (OUTPATIENT)
Dept: CARDIOLOGY | Facility: CLINIC | Age: 74
End: 2019-12-17
Payer: MEDICARE

## 2019-12-17 DIAGNOSIS — Z79.01 LONG TERM (CURRENT) USE OF ANTICOAGULANTS: ICD-10-CM

## 2019-12-17 DIAGNOSIS — I48.91 ATRIAL FIBRILLATION WITH RVR: ICD-10-CM

## 2019-12-17 LAB — INR PPP: 3.4

## 2019-12-17 PROCEDURE — G0250 MD INR TEST REVIE INTER MGMT: HCPCS | Mod: ,,, | Performed by: INTERNAL MEDICINE

## 2019-12-17 PROCEDURE — G0250 PR MD REVIEW INTERPRET OF TEST: ICD-10-PCS | Mod: ,,, | Performed by: INTERNAL MEDICINE

## 2019-12-17 NOTE — PROGRESS NOTES
Fax results received from [employer].  INR:  3.4.  Date drawn 12/17/2019. Patient contacted.  Spoke with Mrs. Flores, wife.  Caregiver reports patient has drank a little more alcohol than usual.  Reminded caregiver patient should limit alcohol consumption as much as possible.    Patient will be advised to seek immediate medical attention if he notices any abnormal bleeding.  Instructions given for patient to hold dose of warfarin on today; then resume dose of warfarin 3.75mg on Sundays and 2.5mg on all other days of the week.   Caregiver repeated instructions and voiced understanding.  Recheck INR in 2 weeks.

## 2019-12-31 ENCOUNTER — ANTI-COAG VISIT (OUTPATIENT)
Dept: CARDIOLOGY | Facility: CLINIC | Age: 74
End: 2019-12-31
Payer: MEDICARE

## 2019-12-31 DIAGNOSIS — I48.91 ATRIAL FIBRILLATION WITH RVR: ICD-10-CM

## 2019-12-31 DIAGNOSIS — Z79.01 LONG TERM (CURRENT) USE OF ANTICOAGULANTS: ICD-10-CM

## 2019-12-31 LAB — INR PPP: 4.5

## 2019-12-31 PROCEDURE — 93793 ANTICOAG MGMT PT WARFARIN: CPT | Mod: ,,,

## 2019-12-31 PROCEDURE — 93793 PR ANTICOAGULANT MGMT FOR PT TAKING WARFARIN: ICD-10-PCS | Mod: ,,,

## 2019-12-31 NOTE — PROGRESS NOTES
Fax results received from [facility].  INR:  4.5.  Date drawn 12/31/2019. Patient contacted.  Spoke with Mrs. Flores, wife.  Caregiver reports patient has taken a few doses of Coricidin HBP.  No other changes noted.   Patient will be advised to seek immediate medical attention if he notices any abnormal bleeding.  Instructions given for patient to hold dose of warfarin on today; then lower dose of warfarin to 2.5mg every evening.   Caregiver repeated instructions and voiced understanding.  Recheck INR in 2 weeks.

## 2020-01-15 ENCOUNTER — ANTI-COAG VISIT (OUTPATIENT)
Dept: CARDIOLOGY | Facility: CLINIC | Age: 75
End: 2020-01-15
Payer: MEDICARE

## 2020-01-15 DIAGNOSIS — Z79.01 LONG TERM (CURRENT) USE OF ANTICOAGULANTS: ICD-10-CM

## 2020-01-15 DIAGNOSIS — I48.91 ATRIAL FIBRILLATION WITH RVR: ICD-10-CM

## 2020-01-15 LAB — INR PPP: 3.7

## 2020-01-15 PROCEDURE — G0250 MD INR TEST REVIE INTER MGMT: HCPCS | Mod: S$GLB,,, | Performed by: INTERNAL MEDICINE

## 2020-01-15 PROCEDURE — G0250 PR MD REVIEW INTERPRET OF TEST: ICD-10-PCS | Mod: S$GLB,,, | Performed by: INTERNAL MEDICINE

## 2020-01-15 NOTE — PROGRESS NOTES
Fax results received from [facility].  INR:  3.7.  Date drawn 1/15/2020. Patient contacted.  Spoke with Mrs. Flores, wife. Previous instructions followed.  No changes noted.   Patient will be advised to seek immediate medical attention if he notices any abnormal bleeding.  Instructions given for patient to lower dose of warfarin to 1.25mg on Wednesdays and Fridays; and 2.5mg on all other days of the week.   Caregiver repeated instructions and voiced understanding.  Recheck INR in 2 weeks.

## 2020-01-16 ENCOUNTER — TELEPHONE (OUTPATIENT)
Dept: CARDIOLOGY | Facility: CLINIC | Age: 75
End: 2020-01-16

## 2020-01-16 NOTE — TELEPHONE ENCOUNTER
Telephoned [facility] to give orders or get fax number to send updated orders.      ----- Message from Sue Bonner sent at 1/16/2020 11:12 AM CST -----  Contact: Patients wife, Sussy hi states that patient has switched to [facility] insurance and they need a new order for them to test patients PT/INR twice monthly, she does not fax number, call them at [phone]. Please all Ms Hi back if needed at [phone]. Thank you

## 2020-01-21 ENCOUNTER — TELEPHONE (OUTPATIENT)
Dept: CARDIOLOGY | Facility: HOSPITAL | Age: 75
End: 2020-01-21

## 2020-01-21 DIAGNOSIS — I25.10 CORONARY ARTERY DISEASE INVOLVING NATIVE CORONARY ARTERY OF NATIVE HEART WITHOUT ANGINA PECTORIS: Chronic | ICD-10-CM

## 2020-01-21 DIAGNOSIS — I25.10 CORONARY ARTERY DISEASE INVOLVING NATIVE CORONARY ARTERY OF NATIVE HEART WITHOUT ANGINA PECTORIS: Primary | Chronic | ICD-10-CM

## 2020-01-21 RX ORDER — CLOPIDOGREL BISULFATE 75 MG/1
75 TABLET ORAL DAILY
Qty: 30 TABLET | Refills: 11 | Status: SHIPPED | OUTPATIENT
Start: 2020-01-21 | End: 2020-01-21 | Stop reason: SDUPTHER

## 2020-01-21 RX ORDER — CLOPIDOGREL BISULFATE 75 MG/1
75 TABLET ORAL DAILY
Qty: 90 TABLET | Refills: 3 | Status: SHIPPED | OUTPATIENT
Start: 2020-01-21 | End: 2021-10-04

## 2020-01-21 NOTE — TELEPHONE ENCOUNTER
Please make sure patient is taking Plavix and Coumadin.    He can stop ASA as it has been > 6 weeks since stent placement    Thanks

## 2020-01-28 ENCOUNTER — ANTI-COAG VISIT (OUTPATIENT)
Dept: CARDIOLOGY | Facility: CLINIC | Age: 75
End: 2020-01-28
Payer: MEDICARE

## 2020-01-28 DIAGNOSIS — I48.91 ATRIAL FIBRILLATION WITH RVR: ICD-10-CM

## 2020-01-28 DIAGNOSIS — Z79.01 LONG TERM (CURRENT) USE OF ANTICOAGULANTS: ICD-10-CM

## 2020-01-28 LAB — INR PPP: 2.5

## 2020-01-28 PROCEDURE — 93793 ANTICOAG MGMT PT WARFARIN: CPT | Mod: S$GLB,,,

## 2020-01-28 PROCEDURE — 93793 PR ANTICOAGULANT MGMT FOR PT TAKING WARFARIN: ICD-10-PCS | Mod: S$GLB,,,

## 2020-01-28 NOTE — PROGRESS NOTES
Fax result received from [facility] [facility].  INR:  2.5.  Date drawn 1/28/2020. Patient contacted.  Spoke with Mrs. Sandra Pitts, wife.  Reports previous instructions were followed.  No other changes.  Will maintain current dose of Warfarin 1.25 mg every Wednesday and Friday; and 2.5 mg on all other days for patient.  Patient to recheck INR in 2 weeks, 2/11/2020.  Mrs. Pitts repeated back instructions for patient and voiced understanding.

## 2020-02-11 ENCOUNTER — ANTI-COAG VISIT (OUTPATIENT)
Dept: CARDIOLOGY | Facility: CLINIC | Age: 75
End: 2020-02-11
Payer: MEDICARE

## 2020-02-11 DIAGNOSIS — Z79.01 LONG TERM (CURRENT) USE OF ANTICOAGULANTS: ICD-10-CM

## 2020-02-11 DIAGNOSIS — I48.91 ATRIAL FIBRILLATION WITH RVR: ICD-10-CM

## 2020-02-11 LAB — INR PPP: 2.3

## 2020-02-11 NOTE — PROGRESS NOTES
Fax results received from [facility].  INR:  2.3.  Date drawn 2/11/2020. Patient contacted.  INR slightly sub-therapeutic.  Spoke with Mrs. Sandra, wife. Previous instructions followed.  No missed doses or significant changes noted.    Instructions given for patient to take warfarin 3.75mg on today; then resume current dose of warfarin 1.25mg on Wednesdays and Fridays; and 2.5mg on all other days of the week.   Caregiver repeated instructions and voiced understanding.  Recheck INR in 2 weeks.   no

## 2020-02-24 ENCOUNTER — ANTI-COAG VISIT (OUTPATIENT)
Dept: CARDIOLOGY | Facility: CLINIC | Age: 75
End: 2020-02-24
Payer: MEDICARE

## 2020-02-24 DIAGNOSIS — I48.91 ATRIAL FIBRILLATION WITH RVR: ICD-10-CM

## 2020-02-24 DIAGNOSIS — Z79.01 LONG TERM (CURRENT) USE OF ANTICOAGULANTS: ICD-10-CM

## 2020-02-24 LAB — INR PPP: 2.2

## 2020-02-24 PROCEDURE — G0250 PR MD REVIEW INTERPRET OF TEST: ICD-10-PCS | Mod: S$GLB,,, | Performed by: INTERNAL MEDICINE

## 2020-02-24 PROCEDURE — G0250 MD INR TEST REVIE INTER MGMT: HCPCS | Mod: S$GLB,,, | Performed by: INTERNAL MEDICINE

## 2020-02-24 NOTE — PROGRESS NOTES
Fax results received from [facility].  INR:  2.2.  Date drawn 2/24/2020. Patient contacted.  INR remains sub-therapeutic.  Spoke with Mr. Pitts. Previous instructions followed.  No missed doses.  Patient states he may have cut down on alcohol.  Will increase dose and monitor patient closely.      Instructions given for patient to take warfarin 1.25mg on Fridays; and 2.5mg on all other days of the week.   Patient repeated instructions and voiced understanding.  Recheck INR in 2 weeks.

## 2020-03-10 ENCOUNTER — ANTI-COAG VISIT (OUTPATIENT)
Dept: CARDIOLOGY | Facility: CLINIC | Age: 75
End: 2020-03-10
Payer: MEDICARE

## 2020-03-10 DIAGNOSIS — Z79.01 LONG TERM (CURRENT) USE OF ANTICOAGULANTS: ICD-10-CM

## 2020-03-10 DIAGNOSIS — I48.0 PAROXYSMAL A-FIB: ICD-10-CM

## 2020-03-10 LAB — INR PPP: 2.5

## 2020-03-10 PROCEDURE — 93793 ANTICOAG MGMT PT WARFARIN: CPT | Mod: S$GLB,,,

## 2020-03-10 PROCEDURE — 93793 PR ANTICOAGULANT MGMT FOR PT TAKING WARFARIN: ICD-10-PCS | Mod: S$GLB,,,

## 2020-03-10 NOTE — PROGRESS NOTES
Fax results received from [facility]  INR:  2.5 (therapeutic).  Date tested:  3/10/2020.  Patient contacted -  Spoke with Mrs. Pitts.  Previous instructions were followed by patient.  Instructions were given to have Mr. Pitts to maintain current dose of warfarin 1.25 mg (1/2 tablet) every Friday and 2.5 mg on all other days per week.  Recheck INR in 2 weeks.  Mrs. Pitts repeated back instructions for patient and voiced understanding.

## 2020-03-24 ENCOUNTER — ANTI-COAG VISIT (OUTPATIENT)
Dept: CARDIOLOGY | Facility: CLINIC | Age: 75
End: 2020-03-24
Payer: MEDICARE

## 2020-03-24 DIAGNOSIS — Z79.01 LONG TERM (CURRENT) USE OF ANTICOAGULANTS: ICD-10-CM

## 2020-03-24 DIAGNOSIS — I48.91 ATRIAL FIBRILLATION WITH RVR: ICD-10-CM

## 2020-03-24 LAB — INR PPP: 1.6

## 2020-03-24 PROCEDURE — G0250 PR MD REVIEW INTERPRET OF TEST: ICD-10-PCS | Mod: S$GLB,,, | Performed by: INTERNAL MEDICINE

## 2020-03-24 PROCEDURE — G0250 MD INR TEST REVIE INTER MGMT: HCPCS | Mod: S$GLB,,, | Performed by: INTERNAL MEDICINE

## 2020-03-24 NOTE — PROGRESS NOTES
Fax results received from [facility].  INR:  2.6.  Date drawn 3/24/2020. Patient contacted.  INR is sub-therapeutic.  Spoke with Mrs. Pitts. Previous instructions followed.  No missed doses or significant changes reported.  No numbness or weakness noted.     Instructions given for patient to increase dose of warfarin to 2.5mg every evening.  Caregiver repeated instructions and voiced understanding.  Recheck INR in 2 weeks.

## 2020-04-08 ENCOUNTER — ANTI-COAG VISIT (OUTPATIENT)
Dept: CARDIOLOGY | Facility: CLINIC | Age: 75
End: 2020-04-08
Payer: MEDICARE

## 2020-04-08 DIAGNOSIS — I48.0 PAROXYSMAL A-FIB: ICD-10-CM

## 2020-04-08 DIAGNOSIS — Z79.01 LONG TERM (CURRENT) USE OF ANTICOAGULANTS: ICD-10-CM

## 2020-04-08 LAB — INR PPP: 2.4

## 2020-04-08 PROCEDURE — 93793 PR ANTICOAGULANT MGMT FOR PT TAKING WARFARIN: ICD-10-PCS | Mod: S$GLB,,,

## 2020-04-08 PROCEDURE — 93793 ANTICOAG MGMT PT WARFARIN: CPT | Mod: S$GLB,,,

## 2020-04-08 NOTE — PROGRESS NOTES
Fax results received from [facility].  INR:  2.4. Test date: 4/08/2020.  Patient contacted.  INR is sub-therapeutic.  Previous instructions followed.  No missed doses or significant changes reported.  No numbness or weakness noted.  Instructions were given for patient to take an increase dose of warfarin 3.75 mg today - only, then resume 2.5 mg every evening.  INR to be recheck in 2 weeks.  Patient repeated back instructions and voiced understanding.

## 2020-04-21 ENCOUNTER — ANTI-COAG VISIT (OUTPATIENT)
Dept: CARDIOLOGY | Facility: CLINIC | Age: 75
End: 2020-04-21
Payer: MEDICARE

## 2020-04-21 DIAGNOSIS — I48.0 PAROXYSMAL A-FIB: ICD-10-CM

## 2020-04-21 DIAGNOSIS — Z79.01 LONG TERM (CURRENT) USE OF ANTICOAGULANTS: ICD-10-CM

## 2020-04-21 LAB — INR PPP: 2.2

## 2020-04-21 PROCEDURE — 93793 ANTICOAG MGMT PT WARFARIN: CPT | Mod: S$GLB,,,

## 2020-04-21 PROCEDURE — 93793 PR ANTICOAGULANT MGMT FOR PT TAKING WARFARIN: ICD-10-PCS | Mod: S$GLB,,,

## 2020-04-21 NOTE — PROGRESS NOTES
Fax results received from [facility].  INR:  2.2 (subtherapeutic). Test date: 4/21/2020 - early recheck.  Patient contacted.  Previous instructions followed.  No significant changes reported.  No numbness or weakness noted.  Instructions given:  Will increase warfarin dose to 5 mg every Tuesday and 2.5 mg on all other days per week.  INR to be recheck in 2 weeks.  Patient and Mrs. Pitts repeated back instructions and voiced understanding.

## 2020-05-05 ENCOUNTER — ANTI-COAG VISIT (OUTPATIENT)
Dept: CARDIOLOGY | Facility: CLINIC | Age: 75
End: 2020-05-05
Payer: MEDICARE

## 2020-05-05 ENCOUNTER — ANTI-COAG VISIT (OUTPATIENT)
Dept: CARDIOLOGY | Facility: CLINIC | Age: 75
End: 2020-05-05

## 2020-05-05 DIAGNOSIS — I48.0 PAROXYSMAL A-FIB: ICD-10-CM

## 2020-05-05 DIAGNOSIS — Z79.01 LONG TERM (CURRENT) USE OF ANTICOAGULANTS: ICD-10-CM

## 2020-05-05 LAB
INR PPP: 2.5
INR PPP: 2.5

## 2020-05-05 PROCEDURE — 93793 PR ANTICOAGULANT MGMT FOR PT TAKING WARFARIN: ICD-10-PCS | Mod: S$GLB,,,

## 2020-05-05 PROCEDURE — 93793 ANTICOAG MGMT PT WARFARIN: CPT | Mod: S$GLB,,,

## 2020-05-05 NOTE — PROGRESS NOTES
Fax results received from [facility].  INR:  2.5 (therapeutic). Test date: 5/05/2020.  Patient contacted.  Previous instructions followed.  Instructions given:  Maintain current dose of warfarin 5 mg every Tuesday and 2.5 mg on all other days per week.  INR to be recheck in 2 weeks.  Patient and Mrs. Pitts repeated back instructions and voiced understanding.

## 2020-05-19 ENCOUNTER — ANTI-COAG VISIT (OUTPATIENT)
Dept: CARDIOLOGY | Facility: CLINIC | Age: 75
End: 2020-05-19
Payer: MEDICARE

## 2020-05-19 DIAGNOSIS — I48.91 ATRIAL FIBRILLATION WITH RVR: ICD-10-CM

## 2020-05-19 DIAGNOSIS — Z79.01 LONG TERM (CURRENT) USE OF ANTICOAGULANTS: ICD-10-CM

## 2020-05-19 LAB — INR PPP: 3.6

## 2020-05-19 PROCEDURE — 93793 PR ANTICOAGULANT MGMT FOR PT TAKING WARFARIN: ICD-10-PCS | Mod: S$GLB,,,

## 2020-05-19 PROCEDURE — 93793 ANTICOAG MGMT PT WARFARIN: CPT | Mod: S$GLB,,,

## 2020-05-19 NOTE — PROGRESS NOTES
Fax results received from [facility].  INR:  3.6 (supratherapeutic). Test date: 5/19/2020.  Patient contacted.  Reports an extra 2.5 mg was taken by error upon yesterday.  No bleeding issues reported.  Instructions given:  Hold warfarin dose today - only, then resume current dose of warfarin 5 mg every Tuesday and 2.5 mg on all other days per week.  Advised to purchase a pill box to avoid future errors.  Any bleeding issues to go directly to ED for evaluation. INR to be recheck in 2 weeks.  Patient repeated back instructions and voiced understanding.

## 2020-06-02 ENCOUNTER — ANTI-COAG VISIT (OUTPATIENT)
Dept: CARDIOLOGY | Facility: CLINIC | Age: 75
End: 2020-06-02
Payer: MEDICARE

## 2020-06-02 DIAGNOSIS — Z79.01 LONG TERM (CURRENT) USE OF ANTICOAGULANTS: ICD-10-CM

## 2020-06-02 DIAGNOSIS — I48.91 ATRIAL FIBRILLATION WITH RVR: ICD-10-CM

## 2020-06-02 LAB — INR PPP: 2.5

## 2020-06-02 PROCEDURE — 93793 PR ANTICOAGULANT MGMT FOR PT TAKING WARFARIN: ICD-10-PCS | Mod: S$GLB,,,

## 2020-06-02 PROCEDURE — 93793 ANTICOAG MGMT PT WARFARIN: CPT | Mod: S$GLB,,,

## 2020-06-02 NOTE — PROGRESS NOTES
Fax results received from [employer].  INR:  2.5 (therapeutic). Test date: 6/02/2020.  Patient contacted.  Mrs. Pitts reports previous instructions followed and no changes.  Instructions given:  Maintain current dose of warfarin 5 mg every Tuesday and 2.5 mg on all other days per week.  INR to be recheck in 2 weeks.  Mrs. Pitts repeated back instructions and voiced understanding.

## 2020-06-16 LAB — INR PPP: 3.7

## 2020-06-17 ENCOUNTER — ANTI-COAG VISIT (OUTPATIENT)
Dept: CARDIOLOGY | Facility: CLINIC | Age: 75
End: 2020-06-17
Payer: MEDICARE

## 2020-06-17 DIAGNOSIS — Z79.01 LONG TERM (CURRENT) USE OF ANTICOAGULANTS: ICD-10-CM

## 2020-06-17 DIAGNOSIS — I48.0 PAROXYSMAL A-FIB: ICD-10-CM

## 2020-06-17 PROCEDURE — 93793 ANTICOAG MGMT PT WARFARIN: CPT | Mod: S$GLB,,,

## 2020-06-17 PROCEDURE — 93793 PR ANTICOAGULANT MGMT FOR PT TAKING WARFARIN: ICD-10-PCS | Mod: S$GLB,,,

## 2020-06-17 NOTE — PROGRESS NOTES
Fax results received from [facility].  INR:  3.7 (supratherapeutic). Test date: 6/16/2020.  Patient contacted.  No recent changes.  No bleeding issues reported.  Instructions given:  Will lower warfarin dose to 2.5 mg on Tuesday, 6/16/2020, then resume current dose of warfarin 5 mg every Tuesday and 2.5 mg on all other days per week.  Recheck INR in 2 weeks.  Advised on bleeding precautions - ED, if needed.  Patient verbalized understanding.

## 2020-06-30 ENCOUNTER — ANTI-COAG VISIT (OUTPATIENT)
Dept: CARDIOLOGY | Facility: CLINIC | Age: 75
End: 2020-06-30
Payer: MEDICARE

## 2020-06-30 DIAGNOSIS — Z79.01 LONG TERM (CURRENT) USE OF ANTICOAGULANTS: ICD-10-CM

## 2020-06-30 DIAGNOSIS — I48.0 PAROXYSMAL A-FIB: ICD-10-CM

## 2020-06-30 LAB — INR PPP: 4.2

## 2020-06-30 PROCEDURE — 93793 PR ANTICOAGULANT MGMT FOR PT TAKING WARFARIN: ICD-10-PCS | Mod: S$GLB,,,

## 2020-06-30 PROCEDURE — 93793 ANTICOAG MGMT PT WARFARIN: CPT | Mod: S$GLB,,,

## 2020-06-30 NOTE — PROGRESS NOTES
Fax received from [facility] [facility].  INR: 4.2.  Patient contacted.  INR is supratherapeutic.  Reports no recent changes.  Previous instructions were followed.  Reports no bleeding issues.  Instructions given: Hold warfarin dose today - only, then will decrease warfarin dose to 2.5 mg every evening.  Recheck INR in 2 weeks.  Advised on bleeding precautions - ED, if needed.  Patient repeated back instructions and verbalized understanding.

## 2020-07-14 ENCOUNTER — ANTI-COAG VISIT (OUTPATIENT)
Dept: CARDIOLOGY | Facility: CLINIC | Age: 75
End: 2020-07-14
Payer: MEDICARE

## 2020-07-14 DIAGNOSIS — I48.91 ATRIAL FIBRILLATION WITH RVR: ICD-10-CM

## 2020-07-14 DIAGNOSIS — Z79.01 LONG TERM (CURRENT) USE OF ANTICOAGULANTS: ICD-10-CM

## 2020-07-14 LAB — INR PPP: 2.8

## 2020-07-14 PROCEDURE — 93793 ANTICOAG MGMT PT WARFARIN: CPT | Mod: S$GLB,,,

## 2020-07-14 PROCEDURE — 93793 PR ANTICOAGULANT MGMT FOR PT TAKING WARFARIN: ICD-10-PCS | Mod: S$GLB,,,

## 2020-07-14 NOTE — PROGRESS NOTES
Fax received from [facility].  INR: 2.8.  Test date:  7/14/2020.  Patient contacted.  Previous instructions were followed.  Will maintain current dose of warfarin 2.5 mg every evening.  INR to be recheck on 7/28/2020.  Patient verbalized understanding.

## 2020-07-28 LAB — INR PPP: 3

## 2020-07-29 ENCOUNTER — ANTI-COAG VISIT (OUTPATIENT)
Dept: CARDIOLOGY | Facility: CLINIC | Age: 75
End: 2020-07-29
Payer: MEDICARE

## 2020-07-29 DIAGNOSIS — Z79.01 LONG TERM (CURRENT) USE OF ANTICOAGULANTS: ICD-10-CM

## 2020-07-29 DIAGNOSIS — Z79.01 ON COUMADIN FOR ATRIAL FIBRILLATION: ICD-10-CM

## 2020-07-29 DIAGNOSIS — I48.91 ON COUMADIN FOR ATRIAL FIBRILLATION: ICD-10-CM

## 2020-07-29 DIAGNOSIS — I48.0 PAROXYSMAL A-FIB: ICD-10-CM

## 2020-07-29 PROCEDURE — 93793 PR ANTICOAGULANT MGMT FOR PT TAKING WARFARIN: ICD-10-PCS | Mod: S$GLB,,,

## 2020-07-29 PROCEDURE — 93793 ANTICOAG MGMT PT WARFARIN: CPT | Mod: S$GLB,,,

## 2020-07-29 NOTE — PROGRESS NOTES
Fax received from [facility].  INR: 3.0.  Test date:  7/28/2020.  INR is therapeutic.  No change in dose.  Patient to maintain current dose of warfarin 2.5 mg every evening.  INR to be recheck on 8/11/2020.

## 2020-08-11 ENCOUNTER — ANTI-COAG VISIT (OUTPATIENT)
Dept: CARDIOLOGY | Facility: CLINIC | Age: 75
End: 2020-08-11
Payer: MEDICARE

## 2020-08-11 LAB — INR PPP: 3.5

## 2020-08-11 PROCEDURE — 93793 PR ANTICOAGULANT MGMT FOR PT TAKING WARFARIN: ICD-10-PCS | Mod: S$GLB,,,

## 2020-08-11 PROCEDURE — 93793 ANTICOAG MGMT PT WARFARIN: CPT | Mod: S$GLB,,,

## 2020-08-11 NOTE — PROGRESS NOTES
INR not at goal. Medications, chart, and patient findings reviewed. See calendar for adjustments to dose and follow up plan.  No changes in diet, medications or health.  !/2 dose x 1 today, then resume.  Recheck in 2 weeks.

## 2020-08-25 ENCOUNTER — ANTI-COAG VISIT (OUTPATIENT)
Dept: CARDIOLOGY | Facility: CLINIC | Age: 75
End: 2020-08-25
Payer: MEDICARE

## 2020-08-25 LAB — INR PPP: 3.5

## 2020-08-25 PROCEDURE — 93793 PR ANTICOAGULANT MGMT FOR PT TAKING WARFARIN: ICD-10-PCS | Mod: S$GLB,,,

## 2020-08-25 PROCEDURE — 93793 ANTICOAG MGMT PT WARFARIN: CPT | Mod: S$GLB,,,

## 2020-08-25 NOTE — PROGRESS NOTES
INR not at goal. Medications, chart, and patient findings reviewed. See calendar for adjustments to dose and follow up plan.  We will hold today and lower as lowering dose 2 weeks ago did not alter INR.  Patient reports a cold this week, using Coricidin and APAP.  I have reviewed the new dose with Mr Pitts.  We will recheck in 2 weeks.

## 2020-09-03 ENCOUNTER — TELEPHONE (OUTPATIENT)
Dept: CARDIOLOGY | Facility: CLINIC | Age: 75
End: 2020-09-03

## 2020-09-03 ENCOUNTER — OFFICE VISIT (OUTPATIENT)
Dept: CARDIOLOGY | Facility: CLINIC | Age: 75
End: 2020-09-03
Payer: MEDICARE

## 2020-09-03 VITALS
SYSTOLIC BLOOD PRESSURE: 144 MMHG | DIASTOLIC BLOOD PRESSURE: 88 MMHG | HEART RATE: 89 BPM | OXYGEN SATURATION: 96 % | WEIGHT: 201.5 LBS | BODY MASS INDEX: 29.76 KG/M2

## 2020-09-03 DIAGNOSIS — I10 ESSENTIAL HYPERTENSION: ICD-10-CM

## 2020-09-03 DIAGNOSIS — R93.1 ABNORMAL ECHOCARDIOGRAM: ICD-10-CM

## 2020-09-03 DIAGNOSIS — I48.0 PAROXYSMAL A-FIB: ICD-10-CM

## 2020-09-03 DIAGNOSIS — I48.3 TYPICAL ATRIAL FLUTTER: ICD-10-CM

## 2020-09-03 DIAGNOSIS — Z95.5 S/P CORONARY ARTERY STENT PLACEMENT: ICD-10-CM

## 2020-09-03 DIAGNOSIS — I25.10 CORONARY ARTERY DISEASE INVOLVING NATIVE CORONARY ARTERY OF NATIVE HEART WITHOUT ANGINA PECTORIS: Primary | Chronic | ICD-10-CM

## 2020-09-03 DIAGNOSIS — Z98.61 S/P PTCA (PERCUTANEOUS TRANSLUMINAL CORONARY ANGIOPLASTY): ICD-10-CM

## 2020-09-03 DIAGNOSIS — I25.2 OLD MI (MYOCARDIAL INFARCTION): ICD-10-CM

## 2020-09-03 DIAGNOSIS — I48.91 ON COUMADIN FOR ATRIAL FIBRILLATION: ICD-10-CM

## 2020-09-03 DIAGNOSIS — Z95.1 S/P CABG (CORONARY ARTERY BYPASS GRAFT): ICD-10-CM

## 2020-09-03 DIAGNOSIS — Z79.01 ON COUMADIN FOR ATRIAL FIBRILLATION: ICD-10-CM

## 2020-09-03 DIAGNOSIS — E78.5 HYPERLIPIDEMIA, UNSPECIFIED HYPERLIPIDEMIA TYPE: Chronic | ICD-10-CM

## 2020-09-03 DIAGNOSIS — G47.33 OBSTRUCTIVE SLEEP APNEA SYNDROME: Chronic | ICD-10-CM

## 2020-09-03 DIAGNOSIS — I48.91 ATRIAL FIBRILLATION WITH RVR: ICD-10-CM

## 2020-09-03 DIAGNOSIS — I50.23 ACUTE ON CHRONIC SYSTOLIC CONGESTIVE HEART FAILURE, NYHA CLASS 2: ICD-10-CM

## 2020-09-03 PROCEDURE — 3079F PR MOST RECENT DIASTOLIC BLOOD PRESSURE 80-89 MM HG: ICD-10-PCS | Mod: CPTII,S$GLB,, | Performed by: PHYSICIAN ASSISTANT

## 2020-09-03 PROCEDURE — 1159F PR MEDICATION LIST DOCUMENTED IN MEDICAL RECORD: ICD-10-PCS | Mod: S$GLB,,, | Performed by: PHYSICIAN ASSISTANT

## 2020-09-03 PROCEDURE — 3008F PR BODY MASS INDEX (BMI) DOCUMENTED: ICD-10-PCS | Mod: CPTII,S$GLB,, | Performed by: PHYSICIAN ASSISTANT

## 2020-09-03 PROCEDURE — 99999 PR PBB SHADOW E&M-EST. PATIENT-LVL III: CPT | Mod: PBBFAC,,, | Performed by: PHYSICIAN ASSISTANT

## 2020-09-03 PROCEDURE — 3008F BODY MASS INDEX DOCD: CPT | Mod: CPTII,S$GLB,, | Performed by: PHYSICIAN ASSISTANT

## 2020-09-03 PROCEDURE — 1159F MED LIST DOCD IN RCRD: CPT | Mod: S$GLB,,, | Performed by: PHYSICIAN ASSISTANT

## 2020-09-03 PROCEDURE — 99214 PR OFFICE/OUTPT VISIT, EST, LEVL IV, 30-39 MIN: ICD-10-PCS | Mod: S$GLB,,, | Performed by: PHYSICIAN ASSISTANT

## 2020-09-03 PROCEDURE — 99214 OFFICE O/P EST MOD 30 MIN: CPT | Mod: S$GLB,,, | Performed by: PHYSICIAN ASSISTANT

## 2020-09-03 PROCEDURE — 3077F PR MOST RECENT SYSTOLIC BLOOD PRESSURE >= 140 MM HG: ICD-10-PCS | Mod: CPTII,S$GLB,, | Performed by: PHYSICIAN ASSISTANT

## 2020-09-03 PROCEDURE — 3079F DIAST BP 80-89 MM HG: CPT | Mod: CPTII,S$GLB,, | Performed by: PHYSICIAN ASSISTANT

## 2020-09-03 PROCEDURE — 99999 PR PBB SHADOW E&M-EST. PATIENT-LVL III: ICD-10-PCS | Mod: PBBFAC,,, | Performed by: PHYSICIAN ASSISTANT

## 2020-09-03 PROCEDURE — 3077F SYST BP >= 140 MM HG: CPT | Mod: CPTII,S$GLB,, | Performed by: PHYSICIAN ASSISTANT

## 2020-09-03 NOTE — TELEPHONE ENCOUNTER
Returned a call to [facility] at [phone] option # 4. I bharath with representative Susie. Susie stevens patient informed their office that his INR testing frequency is now every two weeks. Susie Raza will have to fill out form with change of testing frequency. Susie states she will fax form to Dr. Raza's office at [phone].

## 2020-09-03 NOTE — PROGRESS NOTES
Subjective:    Patient ID:  Jethro Pitts is a 75 y.o. male who presents for follow-up of afib, CAD, HTN, hyperlipidemia      HPI  Mr. Pitts is a 75 year old male patient whose current medical conditions include atrial fibrillation, atrial flutter (previoulsy on Eliquis, now on Coumadin s/p successful PVI and AFL ablation 5/24/16 by Dr. Villafuerte) systolic CHF, cardiomyopathy, CAD s/p CABG, old MI, HTN, and hyperlipidemia who presents today for routine follow-up. He returns today and states he is doing well overall. No cardiac complaints. No angina. Denies chest pain, SOB or anginal equivalent. No lightheadedness, dizziness, palpitations, near syncope, or syncope. No s/s suggestive of CHF. BP slightly elevated today tends to run better at home. He is compliant with his medications. Remains on Coumadin, no bleeding issues. Uses [facility] monitoring. No s/s of TIA/CVA.     Recent labs reviewed. Lipids at goal-LDL 76. CMP stable.        Review of Systems   Constitution: Negative for chills, decreased appetite, fever and malaise/fatigue.   HENT: Negative for congestion, hoarse voice and sore throat.    Eyes: Negative for blurred vision and discharge.   Cardiovascular: Negative for chest pain, claudication, cyanosis, dyspnea on exertion, irregular heartbeat, leg swelling, near-syncope, orthopnea, palpitations and paroxysmal nocturnal dyspnea.   Respiratory: Negative for cough, hemoptysis, shortness of breath, snoring, sputum production and wheezing.    Endocrine: Negative for cold intolerance and heat intolerance.   Hematologic/Lymphatic: Negative for bleeding problem. Bruises/bleeds easily.   Skin: Negative for rash.   Musculoskeletal: Positive for arthritis and joint pain. Negative for back pain, joint swelling, muscle cramps, muscle weakness and myalgias.   Gastrointestinal: Negative for abdominal pain, constipation, diarrhea, heartburn, melena and nausea.   Genitourinary: Negative for hematuria.   Neurological: Negative  for dizziness, focal weakness, headaches, light-headedness, loss of balance, numbness, paresthesias, seizures and weakness.   Psychiatric/Behavioral: Negative for memory loss. The patient does not have insomnia.    Allergic/Immunologic: Negative for hives.     BP (!) 144/88 (BP Location: Left arm, Patient Position: Sitting, BP Method: Medium (Manual))   Pulse 89   Wt 91.4 kg (201 lb 8 oz)   SpO2 96%   BMI 29.76 kg/m²   Objective:    Physical Exam   Constitutional: He is oriented to person, place, and time. He appears well-developed and well-nourished. No distress.   HENT:   Head: Normocephalic and atraumatic.   Eyes: Pupils are equal, round, and reactive to light. Right eye exhibits no discharge. Left eye exhibits no discharge.   Neck: Neck supple. No JVD present.   Cardiovascular: Normal rate, regular rhythm, S1 normal, S2 normal and normal heart sounds.   No murmur heard.  Pulmonary/Chest: Effort normal and breath sounds normal. No respiratory distress. He has no wheezes. He has no rales.   Abdominal: Soft. He exhibits no distension.   Musculoskeletal:         General: No edema.   Neurological: He is alert and oriented to person, place, and time.   Skin: Skin is warm and dry. He is not diaphoretic. No erythema.   Psychiatric: He has a normal mood and affect. His behavior is normal. Thought content normal.   Nursing note and vitals reviewed.    [facility] Results  E. Angiographic Results       Diagnostic:          Ramus artery was present.        - Left Main Coronary Artery:              The LM is normal. There is BRET 3 flow.      - Left Anterior Descending Artery:              The LAD has luminal irregularities. There is BRET 3 flow. the d1 is occluded.      - Left Circumflex Artery:              The proximal LCX has a 70% stenosis. There is BRET 3 flow.                      Lesion Details:   The length is 10-20 mm, eccentric shape, moderate proximal tortuosity, segment angulation of 45-90 degrees, smooth contour,  moderate to heavy calcification.              The mid LCX has a 90% stenosis. There is BRET 3 flow.                      Lesion Details:   The length is 10-20 mm, eccentric shape, moderate proximal tortuosity, segment angulation of 45-90 degrees, irregular contour, mild to moderate calcification.      - Right Coronary Artery:              The RCA has chronic total occlusion. There is BRET 0 flow.      - Aortic Root:              The Aortic Root is normal. There is BRET 3 flow. normal aorta with occluded grafts.      - SVG To RAMUS:              The SVG to RAMUS has chronic total occlusion. There is BRET 0 flow.      - SVG To D1:              The SVG to D1 is normal. There is BRET 3 flow.      - Y graft SVG to RCA and OM1:              The SVG to RCA has chronic total occlusion. There is BRET 0 flow.              The SVG to OM1 has chronic total occlusion. There is BRET 0 flow.      - Left Subclavian Artery:              The LSUBCL is normal.      - Left Internal Mammary Artery:              The left int mammary is normal. is free not attached to coronaries.       Intervention            Proximal LCX:               The lesion was successfully intervened. Post-stenosis of 0% and post-BRET 3 flow. The vessel was accessed natively.  The following items were used: Blln Dulce 3.0 X 20, Blln Quantum 3.0 X 15, Cath Sapph Nc Plus Nc 3.0x15, Stent Edwin 3.0 X 30   (JAYLENE) and Blln Quantum 3.25 X 15.        Mid LCX:               The lesion was successfully intervened. Post-stenosis of 0% and post-BRET 3 flow. The vessel was accessed natively.  The following items were used: Blln Dulce 3.0 X 20, Stent Edwin 3.0 X 30 (JAYLENE) and Blln Quantum 3.25 X 15.       Chemistry        Component Value Date/Time     10/23/2019 0437    K 4.5 10/23/2019 0437     10/23/2019 0437    CO2 26 10/23/2019 0437    BUN 9 10/23/2019 0437    CREATININE 0.7 10/23/2019 0437    GLU 97 10/23/2019 0437        Component Value Date/Time    CALCIUM 9.1  10/23/2019 0437    ALKPHOS 80 10/18/2019 1545    AST 24 10/18/2019 1545    ALT 15 10/18/2019 1545    BILITOT 0.6 10/18/2019 1545    ESTGFRAFRICA >60 10/23/2019 0437    EGFRNONAA >60 10/23/2019 0437        Lab Results   Component Value Date    CHOL 151 04/24/2012    CHOL 151 07/25/2011    CHOL 152 06/09/2010     Lab Results   Component Value Date    HDL 58 04/24/2012    HDL 56 07/25/2011    HDL 48 06/09/2010     Lab Results   Component Value Date    LDLCALC 73.0 04/24/2012    LDLCALC 74.0 07/25/2011    LDLCALC 67.2 06/09/2010     Lab Results   Component Value Date    TRIG 102 04/24/2012    TRIG 105 07/25/2011    TRIG 184 (H) 06/09/2010     Lab Results   Component Value Date    CHOLHDL 38.4 04/24/2012    CHOLHDL 37.1 07/25/2011    CHOLHDL 31.6 06/09/2010     No results found for: LABA1C, HGBA1C    Echo CONCLUSIONS     1 - Concentric hypertrophy.     2 - Wall motion abnormalities.     3 - Mildly to moderately depressed left ventricular systolic function (EF 40-45%).     4 - Indeterminate LV diastolic function.     5 - Normal right ventricular systolic function .     6 - Trivial aortic regurgitation.   Assessment:       1. Coronary artery disease involving native coronary artery of native heart without angina pectoris    2. Abnormal echocardiogram    3. Acute on chronic systolic congestive heart failure, NYHA class 2    4. Atrial fibrillation with RVR    5. Typical atrial flutter    6. Hyperlipidemia, unspecified hyperlipidemia type    7. Essential hypertension    8. Old MI (myocardial infarction)    9. Paroxysmal A-fib    10. S/P CABG (coronary artery bypass graft)    11. S/P coronary artery stent placement    12. S/P PTCA (percutaneous transluminal coronary angioplasty)    13. On Coumadin for atrial fibrillation    14. Obstructive sleep apnea syndrome      Patient doing clinically well. Stable CV wise. No angina/equivalent. Feels great. Compliant with medical therapy. INR checked through [facility] monitoring. Will  increase Coreg to 12.5 mg BID for now for improved BP control. Nurse visit in one month.   Plan:   -Increase Coreg to 2 pills BID (12.5 mg BID)  -Continue other cardiac meds  -Nurse visit in 1 month for BP check  -Cardiac low salt diet  -INR monitoring as through [facility]  -PCP checks labs  -RTC 6 months with an EKG same day

## 2020-09-08 ENCOUNTER — TELEPHONE (OUTPATIENT)
Dept: CARDIOLOGY | Facility: CLINIC | Age: 75
End: 2020-09-08

## 2020-09-08 ENCOUNTER — ANTI-COAG VISIT (OUTPATIENT)
Dept: CARDIOLOGY | Facility: CLINIC | Age: 75
End: 2020-09-08
Payer: MEDICARE

## 2020-09-08 LAB — INR PPP: 2.2

## 2020-09-08 PROCEDURE — 93793 ANTICOAG MGMT PT WARFARIN: CPT | Mod: S$GLB,,,

## 2020-09-08 PROCEDURE — 93793 PR ANTICOAGULANT MGMT FOR PT TAKING WARFARIN: ICD-10-PCS | Mod: S$GLB,,,

## 2020-09-08 NOTE — PROGRESS NOTES
INR not at goal. Medications, chart, and patient findings reviewed. See calendar for adjustments to dose and follow up plan.  Boost for low INR, continue lower dose.

## 2020-09-22 ENCOUNTER — ANTI-COAG VISIT (OUTPATIENT)
Dept: CARDIOLOGY | Facility: CLINIC | Age: 75
End: 2020-09-22
Payer: MEDICARE

## 2020-09-22 DIAGNOSIS — Z79.01 LONG TERM (CURRENT) USE OF ANTICOAGULANTS: ICD-10-CM

## 2020-09-22 DIAGNOSIS — I48.91 ATRIAL FIBRILLATION WITH RVR: ICD-10-CM

## 2020-09-22 LAB — INR PPP: 1.9

## 2020-09-22 PROCEDURE — 93793 ANTICOAG MGMT PT WARFARIN: CPT | Mod: S$GLB,,,

## 2020-09-22 PROCEDURE — 93793 PR ANTICOAGULANT MGMT FOR PT TAKING WARFARIN: ICD-10-PCS | Mod: S$GLB,,,

## 2020-09-22 NOTE — PROGRESS NOTES
Received fax from [employer] on 9/22/20.    Patient contacted:  INR subtherapeutic at 1.9.  Patient reports that he ate nuts, a peanut butter sandwich and a sandwich with mayonnaise within the past 4 days.  Patient reports no signs/symptoms of clotting. Advised patient of risk of clotting with consumption of foods consisting of vitamin K. Sent to PharmD for dosing/instrucitons.

## 2020-09-22 NOTE — PROGRESS NOTES
INR not at goal. Medications, chart, and patient findings reviewed. See calendar for adjustments to dose and follow up plan.  Boost and resume, consider return to 2.5 mg daily if not at goal in 2 weeks.

## 2020-09-30 ENCOUNTER — PATIENT MESSAGE (OUTPATIENT)
Dept: CARDIOLOGY | Facility: CLINIC | Age: 75
End: 2020-09-30

## 2020-10-21 ENCOUNTER — PATIENT MESSAGE (OUTPATIENT)
Dept: CARDIOLOGY | Facility: CLINIC | Age: 75
End: 2020-10-21

## 2020-10-21 ENCOUNTER — ANTI-COAG VISIT (OUTPATIENT)
Dept: CARDIOLOGY | Facility: CLINIC | Age: 75
End: 2020-10-21
Payer: MEDICARE

## 2020-10-21 LAB — INR PPP: 2.1

## 2020-10-21 PROCEDURE — 93793 PR ANTICOAGULANT MGMT FOR PT TAKING WARFARIN: ICD-10-PCS | Mod: S$GLB,,,

## 2020-10-21 PROCEDURE — 93793 ANTICOAG MGMT PT WARFARIN: CPT | Mod: S$GLB,,,

## 2020-10-21 NOTE — PROGRESS NOTES
INR not at goal. Medications, chart, and patient findings reviewed. See calendar for adjustments to dose and follow up plan.  Missing faxes.  We will boost his dose using the patient reported dose today.  He denies any changes.  He will take 1.5 tablets to make 3.75 mg then resume his regular dose which he confirmed today.  Recheck next Tuesday.  We will need to check with meter company as we have not received the last 2 results.

## 2020-10-27 ENCOUNTER — PATIENT MESSAGE (OUTPATIENT)
Dept: CARDIOLOGY | Facility: CLINIC | Age: 75
End: 2020-10-27

## 2020-10-27 LAB — INR PPP: 2.2

## 2020-10-28 ENCOUNTER — ANTI-COAG VISIT (OUTPATIENT)
Dept: CARDIOLOGY | Facility: CLINIC | Age: 75
End: 2020-10-28
Payer: MEDICARE

## 2020-10-28 ENCOUNTER — PATIENT MESSAGE (OUTPATIENT)
Dept: CARDIOLOGY | Facility: CLINIC | Age: 75
End: 2020-10-28

## 2020-10-28 PROCEDURE — 93793 ANTICOAG MGMT PT WARFARIN: CPT | Mod: S$GLB,,,

## 2020-10-28 PROCEDURE — 93793 PR ANTICOAGULANT MGMT FOR PT TAKING WARFARIN: ICD-10-PCS | Mod: S$GLB,,,

## 2020-10-28 NOTE — PROGRESS NOTES
INR not at goal. Medications, chart, and patient findings reviewed. See calendar for adjustments to dose and follow up plan.  Boost and increase this week as he remains subtherapeutic.  No fax again from [facility].  We have provided our fax number to him again to notify Juani.  We will ask him to repeat INR in 1 week.

## 2020-11-02 NOTE — MR AVS SNAPSHOT
[facility]  [address]  [place] [address]  Phone: [phone]  Fax: [phone]                  Jethro Pitts   2017 1:00 PM   Anti-coag visit    Description:  Male : 1945   Provider:  Umm Aguillon PharmD   Department:  [facility]           Diagnoses this Visit        Comments    Long term (current) use of anticoagulants    -  Primary            To Do List           Future Appointments        Provider Department Dept Phone    2017 1:00 PM Umm Aguillon PharmD [facility] [phone]    2017 1:30 PM IDRIS Nash [facility] [phone]    3/2/2017 11:00 AM Umm Aguillon PharmD [facility] [phone]      Goals (5 Years of Data)     None      [facility]     [facility] On Call Nurse Care Line -  Assistance  Registered nurses in the [facility] On Call Center provide clinical advisement, health education, appointment booking, and other advisory services.  Call for this free service at [phone].             Medications           Message regarding Medications     Verify the changes and/or additions to your medication regime listed below are the same as discussed with your clinician today.  If any of these changes or additions are incorrect, please notify your healthcare provider.             Verify that the below list of medications is an accurate representation of the medications you are currently taking.  If none reported, the list may be blank. If incorrect, please contact your healthcare provider. Carry this list with you in case of emergency.           Current Medications     atorvastatin (LIPITOR) 20 MG tablet Take 20 mg by mouth once daily.    carvedilol (COREG) 6.25 MG tablet TAKE 1 TABLET IN THE MORNING  AND TAKE 2 TABLETS AT NIGHT    escitalopram oxalate (LEXAPRO) 10 MG tablet Take 10 mg by mouth once daily.     fish oil-omega-3 fatty acids 300-1,000 mg capsule Take 1,290 g by mouth once  daily.     vitamin D 1000 units Tab 185 mg once daily.     warfarin (COUMADIN) 2.5 MG tablet Take 1/2 tablet Monday, Wednesday and 1 tablet all other days as directed by the coumadin clinic.           Clinical Reference Information           Allergies as of 2/9/2017     Pcn [Penicillins]      Immunizations Administered on Date of Encounter - 2/9/2017     None      Orders Placed During Today's Visit      Normal Orders This Visit    POCT INR          2/9/2017 11:21 AM - Juanis NicoleD      Component Results     Component Value Flag Ref Range Units Status    INR 1.7 (A) 2.5 - 3.0  Final      January 2017 Details    Sun Mon Tue Wed Thu Fri Sat     1               2               3               4               5               6               7                 8               9               10      2.5 mg         11      1.25 mg   See details      12      2.5 mg         13      2.5 mg         14      2.5 mg           15      2.5 mg         16      1.25 mg         17      2.5 mg         18      1.25 mg         19      2.5 mg         20      2.5 mg         21      2.5 mg           22      2.5 mg         23      1.25 mg         24      2.5 mg         25      1.25 mg         26      2.5 mg         27      2.5 mg         28      2.5 mg           29      2.5 mg         30      1.25 mg         31      2.5 mg              Date Details   01/11 Last INR check                 February 2017 Details    Sun Mon Tue Wed Thu Fri Sat        1      1.25 mg         2      2.5 mg         3      2.5 mg         4      2.5 mg           5      2.5 mg         6      1.25 mg         7      2.5 mg         8      1.25 mg         9   1.7   5 mg   See details      10      2.5 mg         11      2.5 mg           12      2.5 mg         13      1.25 mg         14      2.5 mg         15      1.25 mg         16      2.5 mg         17      2.5 mg         18      2.5 mg           19      2.5 mg         20      1.25 mg         21      2.5 mg          22      1.25 mg         23      2.5 mg         24      2.5 mg         25      2.5 mg           26      2.5 mg         27      1.25 mg         28      2.5 mg              Date Details   02/09 This INR check   INR: 1.7                     How to take your warfarin dose     To take:  1.25 mg Take 0.5 of a 2.5 mg tablet.    To take:  2.5 mg Take 1 of the 2.5 mg tablets.    To take:  5 mg Take 2 of the 2.5 mg tablets.           March 2017 Details    Sun Mon Tue Wed Thu Fri Sat        1      1.25 mg         2            3               4                 5               6               7               8               9               10               11                 12               13               14               15               16               17               18                 19               20               21               22               23               24               25                 26               27               28               29               30               31                 Date Details   No additional details    Date of next INR:  3/2/2017         How to take your warfarin dose     To take:  1.25 mg Take 0.5 of a 2.5 mg tablet.    To take:  2.5 mg Take 1 of the 2.5 mg tablets.           Anticoagulation Summary as of 2/9/2017     Maintenance plan 1.25 mg (2.5 mg x 0.5) on Mon, Wed; 2.5 mg (2.5 mg x 1) all other days    Full instructions 2/9: 5 mg; Otherwise 1.25 mg on Mon, Wed; 2.5 mg all other days    Next INR check 3/2/2017      Anticoagulation Episode Summary     Comments [facility] fax: [phone]      Patient Findings     Negatives Signs/symptoms of thrombosis, Signs/symptoms of bleeding, Laboratory test error suspected, Change in health, Change in alcohol use, Change in activity, Upcoming invasive procedure, Emergency department visit, Upcoming dental procedure, Missed doses, Extra doses, Change in medications, Change in diet/appetite, Hospital admission, Bruising, Other complaints       Language Assistance Services     ATTENTION: Language assistance services are available, free of charge. Please call [phone].      ATENCIÓN: Si habla candie, tiene a duran disposición servicios gratuitos de asistencia lingüística. Llame al [phone].     CHÚ Ý: N?u b?n nói Ti?ng Vi?t, có các d?ch v? h? tr? ngôn ng? mi?n phí dành cho b?n. G?i s? [phone].         [facility] complies with applicable Federal civil rights laws and does not discriminate on the basis of race, color, national origin, age, disability, or sex.         Normal

## 2020-11-04 LAB — INR PPP: 3

## 2020-11-06 ENCOUNTER — ANTI-COAG VISIT (OUTPATIENT)
Dept: CARDIOLOGY | Facility: CLINIC | Age: 75
End: 2020-11-06
Payer: MEDICARE

## 2020-11-06 DIAGNOSIS — Z79.01 LONG TERM (CURRENT) USE OF ANTICOAGULANTS: ICD-10-CM

## 2020-11-06 PROCEDURE — 93793 PR ANTICOAGULANT MGMT FOR PT TAKING WARFARIN: ICD-10-PCS | Mod: S$GLB,,,

## 2020-11-06 PROCEDURE — 93793 ANTICOAG MGMT PT WARFARIN: CPT | Mod: S$GLB,,,

## 2020-11-18 ENCOUNTER — ANTI-COAG VISIT (OUTPATIENT)
Dept: CARDIOLOGY | Facility: CLINIC | Age: 75
End: 2020-11-18
Payer: MEDICARE

## 2020-11-18 DIAGNOSIS — I48.91 ATRIAL FIBRILLATION WITH RVR: ICD-10-CM

## 2020-11-18 DIAGNOSIS — Z79.01 LONG TERM (CURRENT) USE OF ANTICOAGULANTS: ICD-10-CM

## 2020-11-18 LAB — INR PPP: 3

## 2020-11-18 PROCEDURE — 93793 ANTICOAG MGMT PT WARFARIN: CPT | Mod: S$GLB,,,

## 2020-11-18 PROCEDURE — 93793 PR ANTICOAGULANT MGMT FOR PT TAKING WARFARIN: ICD-10-PCS | Mod: S$GLB,,,

## 2020-11-18 NOTE — PROGRESS NOTES
Fax received from [employer].  INR: 3.0.  Test date: 11/18/2020.  INR is therapeutic.  Patient contacted to confirmed receipt of fax. No change in dose.  Patient to maintain current dose of warfarin 2.5 mg every evening.  INR to be recheck in 2 weeks.

## 2020-11-19 ENCOUNTER — PATIENT MESSAGE (OUTPATIENT)
Dept: CARDIOLOGY | Facility: CLINIC | Age: 75
End: 2020-11-19

## 2020-12-02 ENCOUNTER — ANTI-COAG VISIT (OUTPATIENT)
Dept: CARDIOLOGY | Facility: CLINIC | Age: 75
End: 2020-12-02
Payer: MEDICARE

## 2020-12-02 DIAGNOSIS — Z79.01 LONG TERM (CURRENT) USE OF ANTICOAGULANTS: ICD-10-CM

## 2020-12-02 DIAGNOSIS — I48.91 ATRIAL FIBRILLATION WITH RVR: ICD-10-CM

## 2020-12-02 LAB — INR PPP: 2.7

## 2020-12-02 PROCEDURE — 93793 ANTICOAG MGMT PT WARFARIN: CPT | Mod: S$GLB,,,

## 2020-12-02 PROCEDURE — 93793 PR ANTICOAGULANT MGMT FOR PT TAKING WARFARIN: ICD-10-PCS | Mod: S$GLB,,,

## 2020-12-02 NOTE — PROGRESS NOTES
Fax received from [employer].  INR is therapeutic at 2.7.   Test Date: 12/2/20.  Patient contacted to confirm receipt of fax.  Left voice message . Instructions: Continue warfarin 2.5 mg everyday.   Recheck on 12/16/20.

## 2020-12-16 ENCOUNTER — ANTI-COAG VISIT (OUTPATIENT)
Dept: CARDIOLOGY | Facility: CLINIC | Age: 75
End: 2020-12-16
Payer: MEDICARE

## 2020-12-16 DIAGNOSIS — I48.91 ATRIAL FIBRILLATION WITH RVR: ICD-10-CM

## 2020-12-16 DIAGNOSIS — Z79.01 LONG TERM (CURRENT) USE OF ANTICOAGULANTS: ICD-10-CM

## 2020-12-16 LAB — INR PPP: 3.3

## 2020-12-16 PROCEDURE — 93793 PR ANTICOAGULANT MGMT FOR PT TAKING WARFARIN: ICD-10-PCS | Mod: S$GLB,,,

## 2020-12-16 PROCEDURE — 93793 ANTICOAG MGMT PT WARFARIN: CPT | Mod: S$GLB,,,

## 2020-12-16 NOTE — PROGRESS NOTES
Fax received from [employer].   Patient contacted.  INR 3.3--supra-therapeutic.  Patient reports he ate cranberry sauce last week.  Advised patient to discontinue cranberry sauce due to increased risk of bleeding with consumption.  Informed patient importance of keeping diet consistent.  Patient reports no bleeding issues.  Advised patient to go to ED if any signs/symptoms of bleeding.  Instructions given:  Hold warfarin dose today 12/16/20; then re-challenge warfarin 2.5 mg every day.  Recheck on 12/23/20.  Patient repeated back correctly and verbalizes understanding.

## 2020-12-23 ENCOUNTER — ANTI-COAG VISIT (OUTPATIENT)
Dept: CARDIOLOGY | Facility: CLINIC | Age: 75
End: 2020-12-23
Payer: MEDICARE

## 2020-12-23 DIAGNOSIS — I48.91 ATRIAL FIBRILLATION WITH RVR: ICD-10-CM

## 2020-12-23 DIAGNOSIS — Z79.01 LONG TERM (CURRENT) USE OF ANTICOAGULANTS: ICD-10-CM

## 2020-12-23 LAB — INR PPP: 2.5

## 2020-12-23 PROCEDURE — 93793 ANTICOAG MGMT PT WARFARIN: CPT | Mod: S$GLB,,,

## 2020-12-23 PROCEDURE — 93793 PR ANTICOAGULANT MGMT FOR PT TAKING WARFARIN: ICD-10-PCS | Mod: S$GLB,,,

## 2020-12-23 NOTE — PROGRESS NOTES
Fax received from [employer].  INR: 2.5.  Test date:  12/23/2020.  INR is back therapeutic.  No change in dose.  Patient to maintain current dose of warfarin 2.5 mg every evening.  INR to be recheck in 2 weeks.

## 2021-01-06 ENCOUNTER — ANTI-COAG VISIT (OUTPATIENT)
Dept: CARDIOLOGY | Facility: CLINIC | Age: 76
End: 2021-01-06
Payer: MEDICARE

## 2021-01-06 DIAGNOSIS — Z79.01 LONG TERM (CURRENT) USE OF ANTICOAGULANTS: ICD-10-CM

## 2021-01-06 DIAGNOSIS — I48.91 ATRIAL FIBRILLATION WITH RVR: ICD-10-CM

## 2021-01-06 LAB — INR PPP: 2.7

## 2021-01-06 PROCEDURE — 93793 PR ANTICOAGULANT MGMT FOR PT TAKING WARFARIN: ICD-10-PCS | Mod: S$GLB,,,

## 2021-01-06 PROCEDURE — 93793 ANTICOAG MGMT PT WARFARIN: CPT | Mod: S$GLB,,,

## 2021-01-21 ENCOUNTER — ANTI-COAG VISIT (OUTPATIENT)
Dept: CARDIOLOGY | Facility: CLINIC | Age: 76
End: 2021-01-21
Payer: MEDICARE

## 2021-01-21 DIAGNOSIS — I48.91 ATRIAL FIBRILLATION WITH RVR: ICD-10-CM

## 2021-01-21 DIAGNOSIS — Z79.01 LONG TERM (CURRENT) USE OF ANTICOAGULANTS: ICD-10-CM

## 2021-01-21 LAB — INR PPP: 3.2

## 2021-01-21 PROCEDURE — 93793 PR ANTICOAGULANT MGMT FOR PT TAKING WARFARIN: ICD-10-PCS | Mod: S$GLB,,,

## 2021-01-21 PROCEDURE — 93793 ANTICOAG MGMT PT WARFARIN: CPT | Mod: S$GLB,,,

## 2021-01-22 ENCOUNTER — PATIENT MESSAGE (OUTPATIENT)
Dept: ADMINISTRATIVE | Facility: OTHER | Age: 76
End: 2021-01-22

## 2021-02-04 ENCOUNTER — ANTI-COAG VISIT (OUTPATIENT)
Dept: CARDIOLOGY | Facility: CLINIC | Age: 76
End: 2021-02-04
Payer: MEDICARE

## 2021-02-04 DIAGNOSIS — I48.91 ATRIAL FIBRILLATION WITH RVR: ICD-10-CM

## 2021-02-04 DIAGNOSIS — Z79.01 LONG TERM (CURRENT) USE OF ANTICOAGULANTS: ICD-10-CM

## 2021-02-04 LAB — INR PPP: 3

## 2021-02-04 PROCEDURE — 93793 PR ANTICOAGULANT MGMT FOR PT TAKING WARFARIN: ICD-10-PCS | Mod: S$GLB,,,

## 2021-02-04 PROCEDURE — 93793 ANTICOAG MGMT PT WARFARIN: CPT | Mod: S$GLB,,,

## 2021-02-18 ENCOUNTER — ANTI-COAG VISIT (OUTPATIENT)
Dept: CARDIOLOGY | Facility: CLINIC | Age: 76
End: 2021-02-18
Payer: MEDICARE

## 2021-02-18 DIAGNOSIS — Z79.01 LONG TERM (CURRENT) USE OF ANTICOAGULANTS: ICD-10-CM

## 2021-02-18 DIAGNOSIS — I48.91 ATRIAL FIBRILLATION WITH RVR: ICD-10-CM

## 2021-02-18 LAB — INR PPP: 2.5

## 2021-02-18 PROCEDURE — 93793 ANTICOAG MGMT PT WARFARIN: CPT | Mod: S$GLB,,,

## 2021-02-18 PROCEDURE — 93793 PR ANTICOAGULANT MGMT FOR PT TAKING WARFARIN: ICD-10-PCS | Mod: S$GLB,,,

## 2021-02-20 ENCOUNTER — IMMUNIZATION (OUTPATIENT)
Dept: INTERNAL MEDICINE | Facility: CLINIC | Age: 76
End: 2021-02-20
Payer: MEDICARE

## 2021-02-20 DIAGNOSIS — Z23 NEED FOR VACCINATION: Primary | ICD-10-CM

## 2021-02-20 PROCEDURE — 91300 COVID-19, MRNA, LNP-S, PF, 30 MCG/0.3 ML DOSE VACCINE: CPT | Mod: PBBFAC | Performed by: FAMILY MEDICINE

## 2021-02-26 ENCOUNTER — TELEPHONE (OUTPATIENT)
Dept: CARDIOLOGY | Facility: CLINIC | Age: 76
End: 2021-02-26

## 2021-03-02 ENCOUNTER — OFFICE VISIT (OUTPATIENT)
Dept: CARDIOLOGY | Facility: CLINIC | Age: 76
End: 2021-03-02
Payer: MEDICARE

## 2021-03-02 ENCOUNTER — HOSPITAL ENCOUNTER (OUTPATIENT)
Dept: CARDIOLOGY | Facility: HOSPITAL | Age: 76
Discharge: HOME | End: 2021-03-02
Payer: MEDICARE

## 2021-03-02 VITALS
HEART RATE: 88 BPM | BODY MASS INDEX: 29.95 KG/M2 | OXYGEN SATURATION: 97 % | WEIGHT: 202.19 LBS | DIASTOLIC BLOOD PRESSURE: 86 MMHG | HEIGHT: 69 IN | SYSTOLIC BLOOD PRESSURE: 134 MMHG

## 2021-03-02 DIAGNOSIS — Z95.5 S/P CORONARY ARTERY STENT PLACEMENT: ICD-10-CM

## 2021-03-02 DIAGNOSIS — Z79.01 ON COUMADIN FOR ATRIAL FIBRILLATION: ICD-10-CM

## 2021-03-02 DIAGNOSIS — I25.10 CORONARY ARTERY DISEASE INVOLVING NATIVE CORONARY ARTERY OF NATIVE HEART WITHOUT ANGINA PECTORIS: Chronic | ICD-10-CM

## 2021-03-02 DIAGNOSIS — I50.23 ACUTE ON CHRONIC SYSTOLIC CONGESTIVE HEART FAILURE, NYHA CLASS 2: ICD-10-CM

## 2021-03-02 DIAGNOSIS — I48.91 ON COUMADIN FOR ATRIAL FIBRILLATION: ICD-10-CM

## 2021-03-02 DIAGNOSIS — Z98.61 S/P PTCA (PERCUTANEOUS TRANSLUMINAL CORONARY ANGIOPLASTY): ICD-10-CM

## 2021-03-02 DIAGNOSIS — I50.23 ACUTE ON CHRONIC SYSTOLIC CONGESTIVE HEART FAILURE: ICD-10-CM

## 2021-03-02 DIAGNOSIS — E66.3 OVERWEIGHT WITH BODY MASS INDEX (BMI) OF 29 TO 29.9 IN ADULT: ICD-10-CM

## 2021-03-02 DIAGNOSIS — I25.10 CORONARY ARTERY DISEASE INVOLVING NATIVE CORONARY ARTERY OF NATIVE HEART WITHOUT ANGINA PECTORIS: Primary | Chronic | ICD-10-CM

## 2021-03-02 DIAGNOSIS — R93.1 ABNORMAL NUCLEAR CARDIAC IMAGING TEST: ICD-10-CM

## 2021-03-02 DIAGNOSIS — Z95.1 S/P CABG (CORONARY ARTERY BYPASS GRAFT): ICD-10-CM

## 2021-03-02 DIAGNOSIS — I25.2 OLD MI (MYOCARDIAL INFARCTION): ICD-10-CM

## 2021-03-02 DIAGNOSIS — E78.5 HYPERLIPIDEMIA, UNSPECIFIED HYPERLIPIDEMIA TYPE: Chronic | ICD-10-CM

## 2021-03-02 DIAGNOSIS — I48.0 PAROXYSMAL A-FIB: ICD-10-CM

## 2021-03-02 DIAGNOSIS — I48.3 TYPICAL ATRIAL FLUTTER: ICD-10-CM

## 2021-03-02 PROCEDURE — 3075F SYST BP GE 130 - 139MM HG: CPT | Mod: CPTII,S$GLB,, | Performed by: PHYSICIAN ASSISTANT

## 2021-03-02 PROCEDURE — 99214 OFFICE O/P EST MOD 30 MIN: CPT | Mod: S$GLB,,, | Performed by: PHYSICIAN ASSISTANT

## 2021-03-02 PROCEDURE — 3079F DIAST BP 80-89 MM HG: CPT | Mod: CPTII,S$GLB,, | Performed by: PHYSICIAN ASSISTANT

## 2021-03-02 PROCEDURE — 99999 PR PBB SHADOW E&M-EST. PATIENT-LVL IV: CPT | Mod: PBBFAC,,, | Performed by: PHYSICIAN ASSISTANT

## 2021-03-02 PROCEDURE — 99999 PR PBB SHADOW E&M-EST. PATIENT-LVL IV: ICD-10-PCS | Mod: PBBFAC,,, | Performed by: PHYSICIAN ASSISTANT

## 2021-03-02 PROCEDURE — 1159F PR MEDICATION LIST DOCUMENTED IN MEDICAL RECORD: ICD-10-PCS | Mod: S$GLB,,, | Performed by: PHYSICIAN ASSISTANT

## 2021-03-02 PROCEDURE — 1159F MED LIST DOCD IN RCRD: CPT | Mod: S$GLB,,, | Performed by: PHYSICIAN ASSISTANT

## 2021-03-02 PROCEDURE — 93010 ELECTROCARDIOGRAM REPORT: CPT | Mod: ,,, | Performed by: INTERNAL MEDICINE

## 2021-03-02 PROCEDURE — 3075F PR MOST RECENT SYSTOLIC BLOOD PRESS GE 130-139MM HG: ICD-10-PCS | Mod: CPTII,S$GLB,, | Performed by: PHYSICIAN ASSISTANT

## 2021-03-02 PROCEDURE — 3008F BODY MASS INDEX DOCD: CPT | Mod: CPTII,S$GLB,, | Performed by: PHYSICIAN ASSISTANT

## 2021-03-02 PROCEDURE — 1126F PR PAIN SEVERITY QUANTIFIED, NO PAIN PRESENT: ICD-10-PCS | Mod: S$GLB,,, | Performed by: PHYSICIAN ASSISTANT

## 2021-03-02 PROCEDURE — 1126F AMNT PAIN NOTED NONE PRSNT: CPT | Mod: S$GLB,,, | Performed by: PHYSICIAN ASSISTANT

## 2021-03-02 PROCEDURE — 99214 PR OFFICE/OUTPT VISIT, EST, LEVL IV, 30-39 MIN: ICD-10-PCS | Mod: S$GLB,,, | Performed by: PHYSICIAN ASSISTANT

## 2021-03-02 PROCEDURE — 93010 EKG 12-LEAD: ICD-10-PCS | Mod: ,,, | Performed by: INTERNAL MEDICINE

## 2021-03-02 PROCEDURE — 3079F PR MOST RECENT DIASTOLIC BLOOD PRESSURE 80-89 MM HG: ICD-10-PCS | Mod: CPTII,S$GLB,, | Performed by: PHYSICIAN ASSISTANT

## 2021-03-02 PROCEDURE — 3008F PR BODY MASS INDEX (BMI) DOCUMENTED: ICD-10-PCS | Mod: CPTII,S$GLB,, | Performed by: PHYSICIAN ASSISTANT

## 2021-03-02 PROCEDURE — 93005 ELECTROCARDIOGRAM TRACING: CPT

## 2021-03-02 RX ORDER — CIPROFLOXACIN 500 MG/1
TABLET ORAL
COMMUNITY
Start: 2021-02-26 | End: 2021-10-04

## 2021-03-02 RX ORDER — CALCIUM CARBONATE/VITAMIN D3 600MG-5MCG
1 TABLET ORAL
COMMUNITY

## 2021-03-02 RX ORDER — TAMSULOSIN HYDROCHLORIDE 0.4 MG/1
0.4 CAPSULE ORAL
COMMUNITY
Start: 2021-02-26 | End: 2021-10-04

## 2021-03-04 ENCOUNTER — ANTI-COAG VISIT (OUTPATIENT)
Dept: CARDIOLOGY | Facility: CLINIC | Age: 76
End: 2021-03-04
Payer: MEDICARE

## 2021-03-04 DIAGNOSIS — Z79.01 LONG TERM (CURRENT) USE OF ANTICOAGULANTS: ICD-10-CM

## 2021-03-04 LAB — INR PPP: 2.3

## 2021-03-04 PROCEDURE — 93793 ANTICOAG MGMT PT WARFARIN: CPT | Mod: S$GLB,,,

## 2021-03-04 PROCEDURE — 93793 PR ANTICOAGULANT MGMT FOR PT TAKING WARFARIN: ICD-10-PCS | Mod: S$GLB,,,

## 2021-03-11 ENCOUNTER — ANTI-COAG VISIT (OUTPATIENT)
Dept: CARDIOLOGY | Facility: CLINIC | Age: 76
End: 2021-03-11
Payer: MEDICARE

## 2021-03-11 DIAGNOSIS — Z79.01 LONG TERM (CURRENT) USE OF ANTICOAGULANTS: ICD-10-CM

## 2021-03-11 DIAGNOSIS — I48.91 ATRIAL FIBRILLATION WITH RVR: ICD-10-CM

## 2021-03-11 LAB — INR PPP: 2.3

## 2021-03-11 PROCEDURE — 93793 PR ANTICOAGULANT MGMT FOR PT TAKING WARFARIN: ICD-10-PCS | Mod: S$GLB,,,

## 2021-03-11 PROCEDURE — 93793 ANTICOAG MGMT PT WARFARIN: CPT | Mod: S$GLB,,,

## 2021-03-13 ENCOUNTER — IMMUNIZATION (OUTPATIENT)
Dept: INTERNAL MEDICINE | Facility: CLINIC | Age: 76
End: 2021-03-13
Payer: MEDICARE

## 2021-03-13 DIAGNOSIS — Z23 NEED FOR VACCINATION: Primary | ICD-10-CM

## 2021-03-13 PROCEDURE — 91300 COVID-19, MRNA, LNP-S, PF, 30 MCG/0.3 ML DOSE VACCINE: CPT | Mod: PBBFAC | Performed by: FAMILY MEDICINE

## 2021-03-13 PROCEDURE — 0002A COVID-19, MRNA, LNP-S, PF, 30 MCG/0.3 ML DOSE VACCINE: CPT | Mod: PBBFAC | Performed by: FAMILY MEDICINE

## 2021-03-29 ENCOUNTER — ANTI-COAG VISIT (OUTPATIENT)
Dept: CARDIOLOGY | Facility: CLINIC | Age: 76
End: 2021-03-29
Payer: MEDICARE

## 2021-03-29 DIAGNOSIS — Z79.01 LONG TERM (CURRENT) USE OF ANTICOAGULANTS: ICD-10-CM

## 2021-03-29 LAB — INR PPP: 4.2

## 2021-03-29 PROCEDURE — 93793 PR ANTICOAGULANT MGMT FOR PT TAKING WARFARIN: ICD-10-PCS | Mod: S$GLB,,,

## 2021-03-29 PROCEDURE — 93793 ANTICOAG MGMT PT WARFARIN: CPT | Mod: S$GLB,,,

## 2021-04-05 ENCOUNTER — ANTI-COAG VISIT (OUTPATIENT)
Dept: CARDIOLOGY | Facility: CLINIC | Age: 76
End: 2021-04-05
Payer: MEDICARE

## 2021-04-05 DIAGNOSIS — Z79.01 LONG TERM (CURRENT) USE OF ANTICOAGULANTS: ICD-10-CM

## 2021-04-05 DIAGNOSIS — I48.91 ATRIAL FIBRILLATION WITH RVR: ICD-10-CM

## 2021-04-05 LAB — INR PPP: 3.8

## 2021-04-05 PROCEDURE — 93793 PR ANTICOAGULANT MGMT FOR PT TAKING WARFARIN: ICD-10-PCS | Mod: S$GLB,,,

## 2021-04-05 PROCEDURE — 93793 ANTICOAG MGMT PT WARFARIN: CPT | Mod: S$GLB,,,

## 2021-04-13 ENCOUNTER — ANTI-COAG VISIT (OUTPATIENT)
Dept: CARDIOLOGY | Facility: CLINIC | Age: 76
End: 2021-04-13
Payer: MEDICARE

## 2021-04-13 DIAGNOSIS — Z79.01 LONG TERM (CURRENT) USE OF ANTICOAGULANTS: ICD-10-CM

## 2021-04-13 LAB — INR PPP: 3

## 2021-04-13 PROCEDURE — G0248 DEMONSTRATE USE HOME INR MON: HCPCS | Mod: S$GLB,,, | Performed by: INTERNAL MEDICINE

## 2021-04-13 PROCEDURE — G0248 PR DEMONSTRATE USE HOME INR MON: ICD-10-PCS | Mod: S$GLB,,, | Performed by: INTERNAL MEDICINE

## 2021-04-27 ENCOUNTER — ANTI-COAG VISIT (OUTPATIENT)
Dept: CARDIOLOGY | Facility: CLINIC | Age: 76
End: 2021-04-27
Payer: MEDICARE

## 2021-04-27 DIAGNOSIS — Z79.01 LONG TERM (CURRENT) USE OF ANTICOAGULANTS: ICD-10-CM

## 2021-04-27 DIAGNOSIS — I48.91 ATRIAL FIBRILLATION WITH RVR: ICD-10-CM

## 2021-04-27 LAB — INR PPP: 1.6

## 2021-04-27 PROCEDURE — 93793 PR ANTICOAGULANT MGMT FOR PT TAKING WARFARIN: ICD-10-PCS | Mod: S$GLB,,,

## 2021-04-27 PROCEDURE — 93793 ANTICOAG MGMT PT WARFARIN: CPT | Mod: S$GLB,,,

## 2021-05-11 ENCOUNTER — ANTI-COAG VISIT (OUTPATIENT)
Dept: CARDIOLOGY | Facility: CLINIC | Age: 76
End: 2021-05-11
Payer: MEDICARE

## 2021-05-11 DIAGNOSIS — Z79.01 LONG TERM (CURRENT) USE OF ANTICOAGULANTS: ICD-10-CM

## 2021-05-11 LAB — INR PPP: 2.9

## 2021-05-11 PROCEDURE — 93793 PR ANTICOAGULANT MGMT FOR PT TAKING WARFARIN: ICD-10-PCS | Mod: S$GLB,,,

## 2021-05-11 PROCEDURE — 93793 ANTICOAG MGMT PT WARFARIN: CPT | Mod: S$GLB,,,

## 2021-05-25 ENCOUNTER — ANTI-COAG VISIT (OUTPATIENT)
Dept: CARDIOLOGY | Facility: CLINIC | Age: 76
End: 2021-05-25
Payer: MEDICARE

## 2021-05-25 DIAGNOSIS — Z79.01 LONG TERM (CURRENT) USE OF ANTICOAGULANTS: ICD-10-CM

## 2021-05-25 LAB — INR PPP: 2.4

## 2021-05-25 PROCEDURE — G0250 MD INR TEST REVIE INTER MGMT: HCPCS | Mod: S$GLB,,, | Performed by: INTERNAL MEDICINE

## 2021-05-25 PROCEDURE — G0250 PR MD REVIEW INTERPRET OF TEST: ICD-10-PCS | Mod: S$GLB,,, | Performed by: INTERNAL MEDICINE

## 2021-06-08 ENCOUNTER — ANTI-COAG VISIT (OUTPATIENT)
Dept: CARDIOLOGY | Facility: CLINIC | Age: 76
End: 2021-06-08
Payer: MEDICARE

## 2021-06-08 ENCOUNTER — PATIENT MESSAGE (OUTPATIENT)
Dept: CARDIOLOGY | Facility: CLINIC | Age: 76
End: 2021-06-08

## 2021-06-08 LAB — INR PPP: 2.3

## 2021-06-22 ENCOUNTER — ANTI-COAG VISIT (OUTPATIENT)
Dept: CARDIOLOGY | Facility: CLINIC | Age: 76
End: 2021-06-22
Payer: MEDICARE

## 2021-06-22 DIAGNOSIS — I48.91 ATRIAL FIBRILLATION WITH RVR: ICD-10-CM

## 2021-06-22 DIAGNOSIS — Z79.01 LONG TERM (CURRENT) USE OF ANTICOAGULANTS: ICD-10-CM

## 2021-06-22 DIAGNOSIS — Z79.01 ON COUMADIN FOR ATRIAL FIBRILLATION: Primary | ICD-10-CM

## 2021-06-22 DIAGNOSIS — I48.91 ON COUMADIN FOR ATRIAL FIBRILLATION: Primary | ICD-10-CM

## 2021-06-22 LAB — INR PPP: 3.2

## 2021-06-22 PROCEDURE — 93793 ANTICOAG MGMT PT WARFARIN: CPT | Mod: S$GLB,,,

## 2021-06-22 PROCEDURE — 93793 PR ANTICOAGULANT MGMT FOR PT TAKING WARFARIN: ICD-10-PCS | Mod: S$GLB,,,

## 2021-07-06 ENCOUNTER — ANTI-COAG VISIT (OUTPATIENT)
Dept: CARDIOLOGY | Facility: CLINIC | Age: 76
End: 2021-07-06
Payer: MEDICARE

## 2021-07-06 DIAGNOSIS — Z79.01 LONG TERM (CURRENT) USE OF ANTICOAGULANTS: ICD-10-CM

## 2021-07-06 DIAGNOSIS — I48.91 ATRIAL FIBRILLATION WITH RVR: ICD-10-CM

## 2021-07-06 LAB — INR PPP: 2.5

## 2021-07-06 PROCEDURE — 93793 ANTICOAG MGMT PT WARFARIN: CPT | Mod: S$GLB,,,

## 2021-07-06 PROCEDURE — 93793 PR ANTICOAGULANT MGMT FOR PT TAKING WARFARIN: ICD-10-PCS | Mod: S$GLB,,,

## 2021-07-20 ENCOUNTER — ANTI-COAG VISIT (OUTPATIENT)
Dept: CARDIOLOGY | Facility: CLINIC | Age: 76
End: 2021-07-20
Payer: MEDICARE

## 2021-07-20 DIAGNOSIS — Z79.01 LONG TERM (CURRENT) USE OF ANTICOAGULANTS: ICD-10-CM

## 2021-07-20 LAB — INR PPP: 2.5

## 2021-07-20 PROCEDURE — 93793 ANTICOAG MGMT PT WARFARIN: CPT | Mod: S$GLB,,,

## 2021-07-20 PROCEDURE — 93793 PR ANTICOAGULANT MGMT FOR PT TAKING WARFARIN: ICD-10-PCS | Mod: S$GLB,,,

## 2021-08-03 LAB — INR PPP: 2.6

## 2021-08-04 ENCOUNTER — ANTI-COAG VISIT (OUTPATIENT)
Dept: CARDIOLOGY | Facility: CLINIC | Age: 76
End: 2021-08-04
Payer: MEDICARE

## 2021-08-04 DIAGNOSIS — I48.0 PAROXYSMAL A-FIB: ICD-10-CM

## 2021-08-04 DIAGNOSIS — Z79.01 LONG TERM (CURRENT) USE OF ANTICOAGULANTS: ICD-10-CM

## 2021-08-04 PROCEDURE — 93793 PR ANTICOAGULANT MGMT FOR PT TAKING WARFARIN: ICD-10-PCS | Mod: S$GLB,,,

## 2021-08-04 PROCEDURE — 93793 ANTICOAG MGMT PT WARFARIN: CPT | Mod: S$GLB,,,

## 2021-08-17 ENCOUNTER — ANTI-COAG VISIT (OUTPATIENT)
Dept: CARDIOLOGY | Facility: CLINIC | Age: 76
End: 2021-08-17
Payer: MEDICARE

## 2021-08-17 DIAGNOSIS — Z79.01 LONG TERM (CURRENT) USE OF ANTICOAGULANTS: ICD-10-CM

## 2021-08-17 LAB — INR PPP: 2.4

## 2021-08-17 PROCEDURE — 93793 ANTICOAG MGMT PT WARFARIN: CPT | Mod: S$GLB,,,

## 2021-08-17 PROCEDURE — 93793 PR ANTICOAGULANT MGMT FOR PT TAKING WARFARIN: ICD-10-PCS | Mod: S$GLB,,,

## 2021-08-27 ENCOUNTER — PATIENT MESSAGE (OUTPATIENT)
Dept: CARDIOLOGY | Facility: CLINIC | Age: 76
End: 2021-08-27

## 2021-08-31 ENCOUNTER — ANTI-COAG VISIT (OUTPATIENT)
Dept: CARDIOLOGY | Facility: CLINIC | Age: 76
End: 2021-08-31
Payer: MEDICARE

## 2021-08-31 DIAGNOSIS — Z79.01 LONG TERM (CURRENT) USE OF ANTICOAGULANTS: ICD-10-CM

## 2021-08-31 LAB — INR PPP: 2.7

## 2021-08-31 PROCEDURE — G0250 MD INR TEST REVIE INTER MGMT: HCPCS | Mod: S$GLB,,,

## 2021-08-31 PROCEDURE — G0250 PR MD REVIEW INTERPRET OF TEST: ICD-10-PCS | Mod: S$GLB,,,

## 2021-09-14 LAB — INR PPP: 2.4

## 2021-09-15 ENCOUNTER — PATIENT MESSAGE (OUTPATIENT)
Dept: CARDIOLOGY | Facility: CLINIC | Age: 76
End: 2021-09-15

## 2021-09-16 ENCOUNTER — ANTI-COAG VISIT (OUTPATIENT)
Dept: CARDIOLOGY | Facility: CLINIC | Age: 76
End: 2021-09-16
Payer: MEDICARE

## 2021-09-16 DIAGNOSIS — Z79.01 LONG TERM (CURRENT) USE OF ANTICOAGULANTS: ICD-10-CM

## 2021-09-16 PROCEDURE — 99499 NO LOS: ICD-10-PCS | Mod: S$GLB,,, | Performed by: INTERNAL MEDICINE

## 2021-09-16 PROCEDURE — 99499 UNLISTED E&M SERVICE: CPT | Mod: S$GLB,,, | Performed by: INTERNAL MEDICINE

## 2021-09-27 ENCOUNTER — HOSPITAL ENCOUNTER (OUTPATIENT)
Dept: CARDIOLOGY | Facility: HOSPITAL | Age: 76
Discharge: HOME | End: 2021-09-27
Attending: PHYSICIAN ASSISTANT
Payer: MEDICARE

## 2021-09-27 VITALS
SYSTOLIC BLOOD PRESSURE: 134 MMHG | DIASTOLIC BLOOD PRESSURE: 86 MMHG | BODY MASS INDEX: 29.92 KG/M2 | HEIGHT: 69 IN | WEIGHT: 202 LBS

## 2021-09-27 DIAGNOSIS — Z95.5 S/P CORONARY ARTERY STENT PLACEMENT: ICD-10-CM

## 2021-09-27 DIAGNOSIS — I25.10 CORONARY ARTERY DISEASE INVOLVING NATIVE CORONARY ARTERY OF NATIVE HEART WITHOUT ANGINA PECTORIS: ICD-10-CM

## 2021-09-27 DIAGNOSIS — I50.23 ACUTE ON CHRONIC SYSTOLIC CONGESTIVE HEART FAILURE, NYHA CLASS 2: ICD-10-CM

## 2021-09-27 LAB
AORTIC ROOT ANNULUS: 3.58 CM
ASCENDING AORTA: 3.66 CM
AV INDEX (PROSTH): 0.8
AV MEAN GRADIENT: 2 MMHG
AV PEAK GRADIENT: 4 MMHG
AV VALVE AREA: 4.35 CM2
AV VELOCITY RATIO: 0.71
BSA FOR ECHO PROCEDURE: 2.11 M2
CV ECHO LV RWT: 0.4 CM
DOP CALC AO PEAK VEL: 1.04 M/S
DOP CALC AO VTI: 21.88 CM
DOP CALC LVOT AREA: 5.5 CM2
DOP CALC LVOT DIAMETER: 2.64 CM
DOP CALC LVOT PEAK VEL: 0.74 M/S
DOP CALC LVOT STROKE VOLUME: 95.25 CM3
DOP CALC RVOT PEAK VEL: 0.48 M/S
DOP CALC RVOT VTI: 12.04 CM
DOP CALCLVOT PEAK VEL VTI: 17.41 CM
E WAVE DECELERATION TIME: 192.09 MSEC
E/A RATIO: 0.65
E/E' RATIO: 7.5 M/S
ECHO LV POSTERIOR WALL: 1.05 CM (ref 0.6–1.1)
EJECTION FRACTION: 35 %
FRACTIONAL SHORTENING: 20 % (ref 28–44)
INTERVENTRICULAR SEPTUM: 1.09 CM (ref 0.6–1.1)
IVRT: 105.61 MSEC
LA MAJOR: 5.49 CM
LA MINOR: 6.04 CM
LA WIDTH: 3.65 CM
LEFT ATRIUM SIZE: 3.8 CM
LEFT ATRIUM VOLUME INDEX: 32.8 ML/M2
LEFT ATRIUM VOLUME: 67.81 CM3
LEFT INTERNAL DIMENSION IN SYSTOLE: 4.21 CM (ref 2.1–4)
LEFT VENTRICLE DIASTOLIC VOLUME INDEX: 63.97 ML/M2
LEFT VENTRICLE DIASTOLIC VOLUME: 132.41 ML
LEFT VENTRICLE MASS INDEX: 104 G/M2
LEFT VENTRICLE SYSTOLIC VOLUME INDEX: 38.2 ML/M2
LEFT VENTRICLE SYSTOLIC VOLUME: 79 ML
LEFT VENTRICULAR INTERNAL DIMENSION IN DIASTOLE: 5.25 CM (ref 3.5–6)
LEFT VENTRICULAR MASS: 215.99 G
LV LATERAL E/E' RATIO: 6 M/S
LV SEPTAL E/E' RATIO: 10 M/S
MV PEAK A VEL: 0.92 M/S
MV PEAK E VEL: 0.6 M/S
MV STENOSIS PRESSURE HALF TIME: 55.7 MS
MV VALVE AREA P 1/2 METHOD: 3.95 CM2
PULM VEIN S/D RATIO: 1.32
PV MEAN GRADIENT: 1 MMHG
PV PEAK D VEL: 0.62 M/S
PV PEAK S VEL: 0.82 M/S
PV PEAK VELOCITY: 0.78 CM/S
RA MAJOR: 4.26 CM
RA PRESSURE: 3 MMHG
RA WIDTH: 3.44 CM
RIGHT VENTRICULAR END-DIASTOLIC DIMENSION: 3.44 CM
SINUS: 3.71 CM
STJ: 3.72 CM
TDI LATERAL: 0.1 M/S
TDI SEPTAL: 0.06 M/S
TDI: 0.08 M/S
TRICUSPID ANNULAR PLANE SYSTOLIC EXCURSION: 1.34 CM

## 2021-09-27 PROCEDURE — 93306 TTE W/DOPPLER COMPLETE: CPT

## 2021-09-27 PROCEDURE — 93306 ECHO (CUPID ONLY): ICD-10-PCS | Mod: 26,,, | Performed by: INTERNAL MEDICINE

## 2021-09-27 PROCEDURE — 93306 TTE W/DOPPLER COMPLETE: CPT | Mod: 26,,, | Performed by: INTERNAL MEDICINE

## 2021-09-28 ENCOUNTER — ANTI-COAG VISIT (OUTPATIENT)
Dept: CARDIOLOGY | Facility: CLINIC | Age: 76
End: 2021-09-28
Payer: MEDICARE

## 2021-09-28 DIAGNOSIS — Z79.01 LONG TERM (CURRENT) USE OF ANTICOAGULANTS: ICD-10-CM

## 2021-09-28 LAB — INR PPP: 2.4

## 2021-09-29 ENCOUNTER — TELEPHONE (OUTPATIENT)
Dept: CARDIOLOGY | Facility: CLINIC | Age: 76
End: 2021-09-29

## 2021-10-04 ENCOUNTER — OFFICE VISIT (OUTPATIENT)
Dept: CARDIOLOGY | Facility: CLINIC | Age: 76
End: 2021-10-04
Payer: MEDICARE

## 2021-10-04 VITALS
WEIGHT: 198.19 LBS | HEART RATE: 77 BPM | DIASTOLIC BLOOD PRESSURE: 74 MMHG | OXYGEN SATURATION: 95 % | SYSTOLIC BLOOD PRESSURE: 128 MMHG | BODY MASS INDEX: 29.27 KG/M2

## 2021-10-04 DIAGNOSIS — I48.0 PAROXYSMAL A-FIB: ICD-10-CM

## 2021-10-04 DIAGNOSIS — I95.9 HYPOTENSION, UNSPECIFIED HYPOTENSION TYPE: ICD-10-CM

## 2021-10-04 DIAGNOSIS — E78.5 HYPERLIPIDEMIA, UNSPECIFIED HYPERLIPIDEMIA TYPE: Chronic | ICD-10-CM

## 2021-10-04 DIAGNOSIS — I48.3 TYPICAL ATRIAL FLUTTER: ICD-10-CM

## 2021-10-04 DIAGNOSIS — R93.1 ABNORMAL NUCLEAR CARDIAC IMAGING TEST: ICD-10-CM

## 2021-10-04 DIAGNOSIS — I50.23 ACUTE ON CHRONIC SYSTOLIC CONGESTIVE HEART FAILURE, NYHA CLASS 2: Primary | ICD-10-CM

## 2021-10-04 DIAGNOSIS — Z98.61 S/P PTCA (PERCUTANEOUS TRANSLUMINAL CORONARY ANGIOPLASTY): ICD-10-CM

## 2021-10-04 DIAGNOSIS — G47.33 OBSTRUCTIVE SLEEP APNEA SYNDROME: Chronic | ICD-10-CM

## 2021-10-04 DIAGNOSIS — Z95.1 S/P CABG (CORONARY ARTERY BYPASS GRAFT): ICD-10-CM

## 2021-10-04 DIAGNOSIS — I10 PRIMARY HYPERTENSION: ICD-10-CM

## 2021-10-04 DIAGNOSIS — I25.2 OLD MI (MYOCARDIAL INFARCTION): ICD-10-CM

## 2021-10-04 DIAGNOSIS — I48.91 ATRIAL FIBRILLATION WITH RVR: ICD-10-CM

## 2021-10-04 DIAGNOSIS — Z95.5 S/P CORONARY ARTERY STENT PLACEMENT: ICD-10-CM

## 2021-10-04 DIAGNOSIS — I25.10 CORONARY ARTERY DISEASE INVOLVING NATIVE CORONARY ARTERY OF NATIVE HEART WITHOUT ANGINA PECTORIS: Chronic | ICD-10-CM

## 2021-10-04 DIAGNOSIS — I50.23 ACUTE ON CHRONIC SYSTOLIC CONGESTIVE HEART FAILURE: ICD-10-CM

## 2021-10-04 DIAGNOSIS — I48.91 ON COUMADIN FOR ATRIAL FIBRILLATION: ICD-10-CM

## 2021-10-04 DIAGNOSIS — Z79.01 ON COUMADIN FOR ATRIAL FIBRILLATION: ICD-10-CM

## 2021-10-04 DIAGNOSIS — R93.1 ABNORMAL ECHOCARDIOGRAM: ICD-10-CM

## 2021-10-04 PROCEDURE — 1126F PR PAIN SEVERITY QUANTIFIED, NO PAIN PRESENT: ICD-10-PCS | Mod: CPTII,S$GLB,, | Performed by: PHYSICIAN ASSISTANT

## 2021-10-04 PROCEDURE — 1160F PR REVIEW ALL MEDS BY PRESCRIBER/CLIN PHARMACIST DOCUMENTED: ICD-10-PCS | Mod: CPTII,S$GLB,, | Performed by: PHYSICIAN ASSISTANT

## 2021-10-04 PROCEDURE — 99214 PR OFFICE/OUTPT VISIT, EST, LEVL IV, 30-39 MIN: ICD-10-PCS | Mod: S$GLB,,, | Performed by: PHYSICIAN ASSISTANT

## 2021-10-04 PROCEDURE — 3008F PR BODY MASS INDEX (BMI) DOCUMENTED: ICD-10-PCS | Mod: CPTII,S$GLB,, | Performed by: PHYSICIAN ASSISTANT

## 2021-10-04 PROCEDURE — 1159F PR MEDICATION LIST DOCUMENTED IN MEDICAL RECORD: ICD-10-PCS | Mod: CPTII,S$GLB,, | Performed by: PHYSICIAN ASSISTANT

## 2021-10-04 PROCEDURE — 3078F DIAST BP <80 MM HG: CPT | Mod: CPTII,S$GLB,, | Performed by: PHYSICIAN ASSISTANT

## 2021-10-04 PROCEDURE — 99999 PR PBB SHADOW E&M-EST. PATIENT-LVL III: ICD-10-PCS | Mod: PBBFAC,,, | Performed by: PHYSICIAN ASSISTANT

## 2021-10-04 PROCEDURE — 1159F MED LIST DOCD IN RCRD: CPT | Mod: CPTII,S$GLB,, | Performed by: PHYSICIAN ASSISTANT

## 2021-10-04 PROCEDURE — 1126F AMNT PAIN NOTED NONE PRSNT: CPT | Mod: CPTII,S$GLB,, | Performed by: PHYSICIAN ASSISTANT

## 2021-10-04 PROCEDURE — 3074F SYST BP LT 130 MM HG: CPT | Mod: CPTII,S$GLB,, | Performed by: PHYSICIAN ASSISTANT

## 2021-10-04 PROCEDURE — 1160F RVW MEDS BY RX/DR IN RCRD: CPT | Mod: CPTII,S$GLB,, | Performed by: PHYSICIAN ASSISTANT

## 2021-10-04 PROCEDURE — 99999 PR PBB SHADOW E&M-EST. PATIENT-LVL III: CPT | Mod: PBBFAC,,, | Performed by: PHYSICIAN ASSISTANT

## 2021-10-04 PROCEDURE — 3008F BODY MASS INDEX DOCD: CPT | Mod: CPTII,S$GLB,, | Performed by: PHYSICIAN ASSISTANT

## 2021-10-04 PROCEDURE — 3078F PR MOST RECENT DIASTOLIC BLOOD PRESSURE < 80 MM HG: ICD-10-PCS | Mod: CPTII,S$GLB,, | Performed by: PHYSICIAN ASSISTANT

## 2021-10-04 PROCEDURE — 99214 OFFICE O/P EST MOD 30 MIN: CPT | Mod: S$GLB,,, | Performed by: PHYSICIAN ASSISTANT

## 2021-10-04 PROCEDURE — 3074F PR MOST RECENT SYSTOLIC BLOOD PRESSURE < 130 MM HG: ICD-10-PCS | Mod: CPTII,S$GLB,, | Performed by: PHYSICIAN ASSISTANT

## 2021-10-05 ENCOUNTER — TELEPHONE (OUTPATIENT)
Dept: CARDIOLOGY | Facility: HOSPITAL | Age: 76
End: 2021-10-05

## 2021-10-05 DIAGNOSIS — Z95.5 S/P CORONARY ARTERY STENT PLACEMENT: ICD-10-CM

## 2021-10-05 DIAGNOSIS — I48.0 PAROXYSMAL A-FIB: ICD-10-CM

## 2021-10-05 DIAGNOSIS — I25.10 CORONARY ARTERY DISEASE INVOLVING NATIVE CORONARY ARTERY OF NATIVE HEART WITHOUT ANGINA PECTORIS: ICD-10-CM

## 2021-10-05 DIAGNOSIS — I10 PRIMARY HYPERTENSION: ICD-10-CM

## 2021-10-05 DIAGNOSIS — I50.23 ACUTE ON CHRONIC SYSTOLIC CONGESTIVE HEART FAILURE: ICD-10-CM

## 2021-10-05 DIAGNOSIS — I25.2 OLD MI (MYOCARDIAL INFARCTION): Primary | ICD-10-CM

## 2021-10-05 DIAGNOSIS — E78.5 HYPERLIPIDEMIA, UNSPECIFIED HYPERLIPIDEMIA TYPE: ICD-10-CM

## 2021-10-12 ENCOUNTER — ANTI-COAG VISIT (OUTPATIENT)
Dept: CARDIOLOGY | Facility: CLINIC | Age: 76
End: 2021-10-12
Payer: MEDICARE

## 2021-10-12 DIAGNOSIS — I48.91 ATRIAL FIBRILLATION WITH RVR: ICD-10-CM

## 2021-10-12 DIAGNOSIS — Z79.01 LONG TERM (CURRENT) USE OF ANTICOAGULANTS: ICD-10-CM

## 2021-10-12 LAB — INR PPP: 2.5

## 2021-10-12 PROCEDURE — 93793 PR ANTICOAGULANT MGMT FOR PT TAKING WARFARIN: ICD-10-PCS | Mod: S$GLB,,,

## 2021-10-12 PROCEDURE — 93793 ANTICOAG MGMT PT WARFARIN: CPT | Mod: S$GLB,,,

## 2021-10-26 LAB — INR PPP: 2.5

## 2021-10-27 ENCOUNTER — ANTI-COAG VISIT (OUTPATIENT)
Dept: CARDIOLOGY | Facility: CLINIC | Age: 76
End: 2021-10-27
Payer: MEDICARE

## 2021-10-27 DIAGNOSIS — Z79.01 LONG TERM (CURRENT) USE OF ANTICOAGULANTS: ICD-10-CM

## 2021-10-27 DIAGNOSIS — I48.0 PAROXYSMAL A-FIB: ICD-10-CM

## 2021-10-27 DIAGNOSIS — I48.91 ATRIAL FIBRILLATION WITH RVR: ICD-10-CM

## 2021-10-27 PROCEDURE — 93793 PR ANTICOAGULANT MGMT FOR PT TAKING WARFARIN: ICD-10-PCS | Mod: S$GLB,,,

## 2021-10-27 PROCEDURE — 93793 ANTICOAG MGMT PT WARFARIN: CPT | Mod: S$GLB,,,

## 2021-11-09 ENCOUNTER — ANTI-COAG VISIT (OUTPATIENT)
Dept: CARDIOLOGY | Facility: CLINIC | Age: 76
End: 2021-11-09
Payer: MEDICARE

## 2021-11-09 DIAGNOSIS — Z79.01 LONG TERM (CURRENT) USE OF ANTICOAGULANTS: ICD-10-CM

## 2021-11-09 LAB — INR PPP: 2.1

## 2021-11-09 PROCEDURE — 93793 PR ANTICOAGULANT MGMT FOR PT TAKING WARFARIN: ICD-10-PCS | Mod: S$GLB,,,

## 2021-11-09 PROCEDURE — 93793 ANTICOAG MGMT PT WARFARIN: CPT | Mod: S$GLB,,,

## 2021-11-10 ENCOUNTER — TELEPHONE (OUTPATIENT)
Dept: CARDIOLOGY | Facility: HOSPITAL | Age: 76
End: 2021-11-10
Payer: MEDICARE

## 2021-11-23 ENCOUNTER — ANTI-COAG VISIT (OUTPATIENT)
Dept: CARDIOLOGY | Facility: CLINIC | Age: 76
End: 2021-11-23
Payer: MEDICARE

## 2021-11-23 DIAGNOSIS — Z79.01 LONG TERM (CURRENT) USE OF ANTICOAGULANTS: ICD-10-CM

## 2021-11-23 DIAGNOSIS — I48.0 PAROXYSMAL A-FIB: ICD-10-CM

## 2021-11-23 LAB — INR PPP: 2.6

## 2021-11-23 PROCEDURE — 93793 PR ANTICOAGULANT MGMT FOR PT TAKING WARFARIN: ICD-10-PCS | Mod: S$GLB,,,

## 2021-11-23 PROCEDURE — 93793 ANTICOAG MGMT PT WARFARIN: CPT | Mod: S$GLB,,,

## 2021-12-06 ENCOUNTER — OFFICE VISIT (OUTPATIENT)
Dept: OPHTHALMOLOGY | Facility: CLINIC | Age: 76
End: 2021-12-06
Payer: MEDICARE

## 2021-12-06 DIAGNOSIS — H52.7 REFRACTIVE ERRORS: ICD-10-CM

## 2021-12-06 DIAGNOSIS — Z96.1 PSEUDOPHAKIA OF BOTH EYES: Primary | ICD-10-CM

## 2021-12-06 DIAGNOSIS — I10 PRIMARY HYPERTENSION: ICD-10-CM

## 2021-12-06 PROCEDURE — 92015 DETERMINE REFRACTIVE STATE: CPT | Mod: S$GLB,,, | Performed by: OPTOMETRIST

## 2021-12-06 PROCEDURE — 92015 PR REFRACTION: ICD-10-PCS | Mod: S$GLB,,, | Performed by: OPTOMETRIST

## 2021-12-06 PROCEDURE — 92004 COMPRE OPH EXAM NEW PT 1/>: CPT | Mod: S$GLB,,, | Performed by: OPTOMETRIST

## 2021-12-06 PROCEDURE — 92004 PR EYE EXAM, NEW PATIENT,COMPREHESV: ICD-10-PCS | Mod: S$GLB,,, | Performed by: OPTOMETRIST

## 2021-12-06 PROCEDURE — 99999 PR PBB SHADOW E&M-EST. PATIENT-LVL II: ICD-10-PCS | Mod: PBBFAC,,, | Performed by: OPTOMETRIST

## 2021-12-06 PROCEDURE — 99999 PR PBB SHADOW E&M-EST. PATIENT-LVL II: CPT | Mod: PBBFAC,,, | Performed by: OPTOMETRIST

## 2021-12-07 ENCOUNTER — ANTI-COAG VISIT (OUTPATIENT)
Dept: CARDIOLOGY | Facility: CLINIC | Age: 76
End: 2021-12-07
Payer: MEDICARE

## 2021-12-07 DIAGNOSIS — Z79.01 LONG TERM (CURRENT) USE OF ANTICOAGULANTS: ICD-10-CM

## 2021-12-07 DIAGNOSIS — I48.91 ATRIAL FIBRILLATION WITH RVR: ICD-10-CM

## 2021-12-07 LAB — INR PPP: 3

## 2021-12-07 PROCEDURE — 93793 ANTICOAG MGMT PT WARFARIN: CPT | Mod: S$GLB,,,

## 2021-12-07 PROCEDURE — 93793 PR ANTICOAGULANT MGMT FOR PT TAKING WARFARIN: ICD-10-PCS | Mod: S$GLB,,,

## 2021-12-22 ENCOUNTER — ANTI-COAG VISIT (OUTPATIENT)
Dept: CARDIOLOGY | Facility: CLINIC | Age: 76
End: 2021-12-22
Payer: MEDICARE

## 2021-12-22 DIAGNOSIS — Z79.01 LONG TERM (CURRENT) USE OF ANTICOAGULANTS: ICD-10-CM

## 2021-12-22 LAB — INR PPP: 3.5

## 2021-12-22 PROCEDURE — G0250 MD INR TEST REVIE INTER MGMT: HCPCS | Mod: S$GLB,,,

## 2021-12-22 PROCEDURE — G0250 PR MD REVIEW INTERPRET OF TEST: ICD-10-PCS | Mod: S$GLB,,,

## 2022-01-05 ENCOUNTER — ANTI-COAG VISIT (OUTPATIENT)
Dept: CARDIOLOGY | Facility: CLINIC | Age: 77
End: 2022-01-05
Payer: MEDICARE

## 2022-01-05 DIAGNOSIS — Z79.01 LONG TERM (CURRENT) USE OF ANTICOAGULANTS: ICD-10-CM

## 2022-01-05 DIAGNOSIS — I48.91 ATRIAL FIBRILLATION WITH RVR: ICD-10-CM

## 2022-01-05 LAB — INR PPP: 2.4

## 2022-01-05 PROCEDURE — 99499 NO LOS: ICD-10-PCS | Mod: S$GLB,,, | Performed by: INTERNAL MEDICINE

## 2022-01-05 PROCEDURE — 99499 UNLISTED E&M SERVICE: CPT | Mod: S$GLB,,, | Performed by: INTERNAL MEDICINE

## 2022-01-05 NOTE — PROGRESS NOTES
Fax received from [employer].  INR 2.4--subtherapeutic.  Test Date: 1/5/22.  Patient contacted.  Patient reports ate greens for New Year's.  Advised of increased risk of clotting with eating greens and signs/symptoms of clotting.  Reports no signs/symptoms of clotting.  Advised to go to ED if any signs/symptoms of clotting.   Instructions given:  Will give boosted dose of warfarin 3.75 mg today 1/5/22; then resume warfarin 2.5 mg every day.  Recheck in two weeks.  Patient repeated back correctly and verbalizes understanding.

## 2022-01-19 ENCOUNTER — ANTI-COAG VISIT (OUTPATIENT)
Dept: CARDIOLOGY | Facility: CLINIC | Age: 77
End: 2022-01-19
Payer: MEDICARE

## 2022-01-19 DIAGNOSIS — Z79.01 LONG TERM (CURRENT) USE OF ANTICOAGULANTS: ICD-10-CM

## 2022-01-19 LAB — INR PPP: 2.3

## 2022-01-19 PROCEDURE — G0248 DEMONSTRATE USE HOME INR MON: HCPCS | Mod: S$GLB,,, | Performed by: INTERNAL MEDICINE

## 2022-01-19 PROCEDURE — G0248 PR DEMONSTRATE USE HOME INR MON: ICD-10-PCS | Mod: S$GLB,,, | Performed by: INTERNAL MEDICINE

## 2022-01-19 NOTE — PROGRESS NOTES
Fax [employer]  -2.3, slightly subtherapeutic.  Pt contacted, no changes.  We will boost again and re-challenge as he is usually fairly stable on this dose.  Repeat in 2 weeks, consider dose increase then if low.

## 2022-01-20 ENCOUNTER — HOSPITAL ENCOUNTER (OUTPATIENT)
Dept: CARDIOLOGY | Facility: HOSPITAL | Age: 77
Discharge: HOME | End: 2022-01-20
Attending: INTERNAL MEDICINE
Payer: MEDICARE

## 2022-01-20 ENCOUNTER — OFFICE VISIT (OUTPATIENT)
Dept: CARDIOLOGY | Facility: CLINIC | Age: 77
End: 2022-01-20
Payer: MEDICARE

## 2022-01-20 ENCOUNTER — TELEPHONE (OUTPATIENT)
Dept: CARDIOLOGY | Facility: CLINIC | Age: 77
End: 2022-01-20

## 2022-01-20 VITALS
HEIGHT: 69 IN | SYSTOLIC BLOOD PRESSURE: 136 MMHG | DIASTOLIC BLOOD PRESSURE: 82 MMHG | HEART RATE: 83 BPM | OXYGEN SATURATION: 98 % | BODY MASS INDEX: 29.48 KG/M2 | WEIGHT: 199.06 LBS

## 2022-01-20 DIAGNOSIS — I50.23 ACUTE ON CHRONIC SYSTOLIC CONGESTIVE HEART FAILURE: ICD-10-CM

## 2022-01-20 DIAGNOSIS — E78.5 HYPERLIPIDEMIA, UNSPECIFIED HYPERLIPIDEMIA TYPE: Chronic | ICD-10-CM

## 2022-01-20 DIAGNOSIS — I48.0 PAROXYSMAL A-FIB: ICD-10-CM

## 2022-01-20 DIAGNOSIS — I25.2 OLD MI (MYOCARDIAL INFARCTION): ICD-10-CM

## 2022-01-20 DIAGNOSIS — I48.3 TYPICAL ATRIAL FLUTTER: ICD-10-CM

## 2022-01-20 DIAGNOSIS — Z79.01 ON COUMADIN FOR ATRIAL FIBRILLATION: ICD-10-CM

## 2022-01-20 DIAGNOSIS — I25.10 CORONARY ARTERY DISEASE INVOLVING NATIVE CORONARY ARTERY OF NATIVE HEART WITHOUT ANGINA PECTORIS: Chronic | ICD-10-CM

## 2022-01-20 DIAGNOSIS — R93.1 ABNORMAL ECHOCARDIOGRAM: ICD-10-CM

## 2022-01-20 DIAGNOSIS — I48.91 ATRIAL FIBRILLATION WITH RVR: ICD-10-CM

## 2022-01-20 DIAGNOSIS — Z95.5 S/P CORONARY ARTERY STENT PLACEMENT: ICD-10-CM

## 2022-01-20 DIAGNOSIS — I10 PRIMARY HYPERTENSION: ICD-10-CM

## 2022-01-20 DIAGNOSIS — I25.10 CORONARY ARTERY DISEASE INVOLVING NATIVE CORONARY ARTERY OF NATIVE HEART WITHOUT ANGINA PECTORIS: Primary | ICD-10-CM

## 2022-01-20 DIAGNOSIS — I25.10 CORONARY ARTERY DISEASE INVOLVING NATIVE CORONARY ARTERY OF NATIVE HEART WITHOUT ANGINA PECTORIS: ICD-10-CM

## 2022-01-20 DIAGNOSIS — K72.00 SHOCK LIVER: ICD-10-CM

## 2022-01-20 DIAGNOSIS — I50.23 ACUTE ON CHRONIC SYSTOLIC CONGESTIVE HEART FAILURE, NYHA CLASS 2: Primary | ICD-10-CM

## 2022-01-20 DIAGNOSIS — G47.33 OBSTRUCTIVE SLEEP APNEA SYNDROME: Chronic | ICD-10-CM

## 2022-01-20 DIAGNOSIS — Z95.1 S/P CABG (CORONARY ARTERY BYPASS GRAFT): ICD-10-CM

## 2022-01-20 DIAGNOSIS — Z98.61 S/P PTCA (PERCUTANEOUS TRANSLUMINAL CORONARY ANGIOPLASTY): ICD-10-CM

## 2022-01-20 DIAGNOSIS — E66.3 OVERWEIGHT WITH BODY MASS INDEX (BMI) OF 29 TO 29.9 IN ADULT: ICD-10-CM

## 2022-01-20 DIAGNOSIS — R55 NEAR SYNCOPE: ICD-10-CM

## 2022-01-20 DIAGNOSIS — R93.1 ABNORMAL NUCLEAR CARDIAC IMAGING TEST: ICD-10-CM

## 2022-01-20 DIAGNOSIS — I48.91 ON COUMADIN FOR ATRIAL FIBRILLATION: ICD-10-CM

## 2022-01-20 DIAGNOSIS — F10.10 ETOH ABUSE: Chronic | ICD-10-CM

## 2022-01-20 PROCEDURE — 93010 EKG 12-LEAD: ICD-10-PCS | Mod: ,,, | Performed by: INTERNAL MEDICINE

## 2022-01-20 PROCEDURE — 1159F MED LIST DOCD IN RCRD: CPT | Mod: CPTII,S$GLB,, | Performed by: INTERNAL MEDICINE

## 2022-01-20 PROCEDURE — 3079F DIAST BP 80-89 MM HG: CPT | Mod: CPTII,S$GLB,, | Performed by: INTERNAL MEDICINE

## 2022-01-20 PROCEDURE — 93005 ELECTROCARDIOGRAM TRACING: CPT

## 2022-01-20 PROCEDURE — 99214 OFFICE O/P EST MOD 30 MIN: CPT | Mod: S$GLB,,, | Performed by: INTERNAL MEDICINE

## 2022-01-20 PROCEDURE — 3075F SYST BP GE 130 - 139MM HG: CPT | Mod: CPTII,S$GLB,, | Performed by: INTERNAL MEDICINE

## 2022-01-20 PROCEDURE — 1159F PR MEDICATION LIST DOCUMENTED IN MEDICAL RECORD: ICD-10-PCS | Mod: CPTII,S$GLB,, | Performed by: INTERNAL MEDICINE

## 2022-01-20 PROCEDURE — 3079F PR MOST RECENT DIASTOLIC BLOOD PRESSURE 80-89 MM HG: ICD-10-PCS | Mod: CPTII,S$GLB,, | Performed by: INTERNAL MEDICINE

## 2022-01-20 PROCEDURE — 3008F PR BODY MASS INDEX (BMI) DOCUMENTED: ICD-10-PCS | Mod: CPTII,S$GLB,, | Performed by: INTERNAL MEDICINE

## 2022-01-20 PROCEDURE — 93010 ELECTROCARDIOGRAM REPORT: CPT | Mod: ,,, | Performed by: INTERNAL MEDICINE

## 2022-01-20 PROCEDURE — 99999 PR PBB SHADOW E&M-EST. PATIENT-LVL III: ICD-10-PCS | Mod: PBBFAC,,, | Performed by: INTERNAL MEDICINE

## 2022-01-20 PROCEDURE — 99214 PR OFFICE/OUTPT VISIT, EST, LEVL IV, 30-39 MIN: ICD-10-PCS | Mod: S$GLB,,, | Performed by: INTERNAL MEDICINE

## 2022-01-20 PROCEDURE — 99999 PR PBB SHADOW E&M-EST. PATIENT-LVL III: CPT | Mod: PBBFAC,,, | Performed by: INTERNAL MEDICINE

## 2022-01-20 PROCEDURE — 3075F PR MOST RECENT SYSTOLIC BLOOD PRESS GE 130-139MM HG: ICD-10-PCS | Mod: CPTII,S$GLB,, | Performed by: INTERNAL MEDICINE

## 2022-01-20 PROCEDURE — 3008F BODY MASS INDEX DOCD: CPT | Mod: CPTII,S$GLB,, | Performed by: INTERNAL MEDICINE

## 2022-01-20 RX ORDER — CARVEDILOL 12.5 MG/1
12.5 TABLET ORAL 2 TIMES DAILY WITH MEALS
Qty: 180 TABLET | Refills: 3 | Status: SHIPPED | OUTPATIENT
Start: 2022-01-20

## 2022-01-20 RX ORDER — LOSARTAN POTASSIUM 25 MG/1
12.5 TABLET ORAL DAILY
Qty: 30 TABLET | Refills: 3 | Status: SHIPPED | OUTPATIENT
Start: 2022-01-20 | End: 2022-02-15 | Stop reason: SDUPTHER

## 2022-01-20 RX ORDER — TELMISARTAN 20 MG/1
20 TABLET ORAL DAILY
COMMUNITY
Start: 2022-01-20 | End: 2022-02-15 | Stop reason: ALTCHOICE

## 2022-01-20 NOTE — PROGRESS NOTES
Subjective:   Patient ID:  Jethro Pitts is a 76 y.o. male who presents for follow up of Follow-up      HPI   10/4/2021 JOSÉ MIGUEL STEINBERG   Mr. Pitts is a 75 year old male patient whose current medical conditions include atrial fibrillation, atrial flutter (previoulsy on Eliquis, now on Coumadin s/p successful PVI and AFL ablation 5/24/16 by Dr. Villafuerte) systolic CHF, cardiomyopathy, CAD s/p CABG s/p PCI of LCX in 10/19 old MI, HTN, and hyperlipidemia who presents today for routine follow-up. He returns today and states he is doing ok. Does endorse fatigue with activity. Feels his mobility is limited due to knee pain. No overt chest pain, heaviness, or tightness. No SOB/FOY. No LE edema, PND, orthopnea, or excessive weight gain. BP controlled today in office. Patient is compliant with his medications, on Plavix, and Coumadin. No bleeding issues. No s/s of TIA/CVA.     Echo 9/27/21 showed EF of 35%, down from prior of 40-45%, will discuss further with Dr. Raza about possible MPI stress test.     Labs scheduled for when he and his wife return from their trip to [place].     1/20/2022  HERE FOR F/U FUNCTIONAL CAPACITY UNCHANGED NO ORTHOPNEA PND NO LEG SWELLING NO PALPITATION NOT USING CPAP HAS LOST WEIGHT COMPLIANT WITH DIET AND MEDS. SHORT OF BREATH WHEN LIFTS ANYTHING HEAVY. HE CAN WALK W/O ANY SIGNIFICANT LIMITATION EXCEPT HIS KNEES ARE PAINFUL.   Past Medical History:   Diagnosis Date    Acute coronary syndrome     Anticoagulant long-term use     Atrial fibrillation     Atrial flutter     CHF (congestive heart failure)     Coronary artery disease     Depression     Encounter for blood transfusion     Hyperlipidemia     Hypertension     Old MI (myocardial infarction)     S/P CABG (coronary artery bypass graft) 09/18/2007    S/P PTCA (percutaneous transluminal coronary angioplasty) 08/16/2007    Sleep apnea        Past Surgical History:   Procedure Laterality Date    CATARACT EXTRACTION Bilateral      CORONARY ANGIOPLASTY      CORONARY ARTERY BYPASS GRAFT      EYE SURGERY      KNEE SURGERY      LEFT HEART CATHETERIZATION Left 10/22/2019    Procedure: CATHETERIZATION, HEART, LEFT;  Surgeon: Henna Raza MD;  Location: [facility] CATH LAB;  Service: Cardiology;  Laterality: Left;  830 start    RADIOFREQUENCY ABLATION  2016    AFL and AF        Social History     Tobacco Use    Smoking status: Former Smoker     Packs/day: 0.50     Years: 15.00     Pack years: 7.50     Quit date: 1983     Years since quittin.1    Smokeless tobacco: Former User   Substance Use Topics    Alcohol use: Yes     Alcohol/week: 49.0 standard drinks     Types: 7 Glasses of wine, 42 Cans of beer per week    Drug use: No       Family History   Problem Relation Age of Onset    Coronary artery disease Mother     Hypertension Mother     Coronary artery disease Sister     Hypertension Sister     Coronary artery disease Brother     Hypertension Brother        Current Outpatient Medications   Medication Sig    atorvastatin (LIPITOR) 20 MG tablet Take 2 tablets (40 mg total) by mouth every evening.    calcium-vitamin D tablet 600 mg-200 units Take 1 tablet by mouth.    carvedilol (COREG) 6.25 MG tablet TAKE 1 TABLET IN THE MORNING  AND TAKE 2 TABLETS AT NIGHT    clopidogrel (PLAVIX) 75 mg tablet Take 1 tablet (75 mg total) by mouth once daily.    escitalopram oxalate (LEXAPRO) 10 MG tablet Take 10 mg by mouth once daily.     prothrombin time test strips Strp Test daily    prothrombin time/INR test metr Misc Test weekly    warfarin (COUMADIN) 2.5 MG tablet Take 1.5 tablets by mouth on Sundays; and 1 tablet on all other days. (Patient taking differently: Take 2.5 mg by mouth Daily.)    cholecalciferol, vitamin D3, 125 mcg (5,000 unit) Tab Take 185 mg by mouth.      No current facility-administered medications for this visit.     Current Outpatient Medications on File Prior to Visit   Medication Sig     atorvastatin (LIPITOR) 20 MG tablet Take 2 tablets (40 mg total) by mouth every evening.    calcium-vitamin D tablet 600 mg-200 units Take 1 tablet by mouth.    carvedilol (COREG) 6.25 MG tablet TAKE 1 TABLET IN THE MORNING  AND TAKE 2 TABLETS AT NIGHT    clopidogrel (PLAVIX) 75 mg tablet Take 1 tablet (75 mg total) by mouth once daily.    escitalopram oxalate (LEXAPRO) 10 MG tablet Take 10 mg by mouth once daily.     prothrombin time test strips Strp Test daily    prothrombin time/INR test metr Misc Test weekly    warfarin (COUMADIN) 2.5 MG tablet Take 1.5 tablets by mouth on Sundays; and 1 tablet on all other days. (Patient taking differently: Take 2.5 mg by mouth Daily.)    cholecalciferol, vitamin D3, 125 mcg (5,000 unit) Tab Take 185 mg by mouth.      No current facility-administered medications on file prior to visit.     Review of patient's allergies indicates:   Allergen Reactions    Pcn [penicillins] Rash     Review of Systems   Constitutional: Negative for malaise/fatigue.   Eyes: Negative for blurred vision.   Cardiovascular: Positive for dyspnea on exertion. Negative for chest pain, claudication, cyanosis, irregular heartbeat, leg swelling, near-syncope, orthopnea, palpitations and paroxysmal nocturnal dyspnea.   Respiratory: Positive for shortness of breath. Negative for cough and hemoptysis.    Hematologic/Lymphatic: Negative for bleeding problem. Does not bruise/bleed easily.   Skin: Negative for dry skin and itching.   Musculoskeletal: Positive for arthritis, joint pain and stiffness. Negative for falls, muscle weakness and myalgias.   Gastrointestinal: Negative for abdominal pain, diarrhea, heartburn, hematemesis, hematochezia and melena.   Genitourinary: Negative for flank pain and hematuria.   Neurological: Negative for dizziness, focal weakness, headaches, light-headedness, numbness, paresthesias, seizures and weakness.   Psychiatric/Behavioral: Negative for altered mental status and  memory loss. The patient is not nervous/anxious.    Allergic/Immunologic: Negative for hives.       Objective:   Physical Exam  Vitals and nursing note reviewed.   Constitutional:       General: He is not in acute distress.     Appearance: He is well-developed and well-nourished. He is not diaphoretic.   HENT:      Head: Normocephalic and atraumatic.   Eyes:      General:         Right eye: No discharge.         Left eye: No discharge.      Extraocular Movements: EOM normal.      Pupils: Pupils are equal, round, and reactive to light.   Neck:      Thyroid: No thyromegaly.      Vascular: No carotid bruit or JVD.   Cardiovascular:      Rate and Rhythm: Normal rate and regular rhythm.      Pulses: Normal pulses and intact distal pulses.      Heart sounds: Normal heart sounds. No murmur heard.  No friction rub. No gallop.    Pulmonary:      Effort: Pulmonary effort is normal. No respiratory distress.      Breath sounds: Normal breath sounds. No wheezing or rales.      Comments: SCAR CABGW ELL HEALED.   Chest:      Chest wall: No tenderness.   Abdominal:      General: Bowel sounds are normal. There is no distension.      Palpations: Abdomen is soft.      Tenderness: There is no abdominal tenderness.   Musculoskeletal:         General: No edema. Normal range of motion.      Cervical back: Neck supple.      Right lower leg: No edema.      Left lower leg: No edema.   Skin:     General: Skin is warm and dry.      Findings: No erythema or rash.   Neurological:      Mental Status: He is alert and oriented to person, place, and time.      Cranial Nerves: No cranial nerve deficit.   Psychiatric:         Mood and Affect: Mood and affect normal.         Behavior: Behavior normal.         Judgment: Judgment normal.       Vitals:    01/20/22 0919 01/20/22 0923   BP: 134/80 136/82   BP Location: Left arm Right arm   Patient Position: Sitting Sitting   BP Method: Medium (Manual) Medium (Manual)   Pulse: 83 83   SpO2: 98%    Weight:  "90.3 kg (199 lb 1.2 oz)    Height: 5' 9" (1.753 m)      Lab Results   Component Value Date    CHOL 151 04/24/2012    CHOL 151 07/25/2011    CHOL 152 06/09/2010     Lab Results   Component Value Date    HDL 58 04/24/2012    HDL 56 07/25/2011    HDL 48 06/09/2010     Lab Results   Component Value Date    LDLCALC 73.0 04/24/2012    LDLCALC 74.0 07/25/2011    LDLCALC 67.2 06/09/2010     Lab Results   Component Value Date    TRIG 102 04/24/2012    TRIG 105 07/25/2011    TRIG 184 (H) 06/09/2010     Lab Results   Component Value Date    CHOLHDL 38.4 04/24/2012    CHOLHDL 37.1 07/25/2011    CHOLHDL 31.6 06/09/2010       Chemistry        Component Value Date/Time     10/23/2019 0437    K 4.5 10/23/2019 0437     10/23/2019 0437    CO2 26 10/23/2019 0437    BUN 9 10/23/2019 0437    CREATININE 0.7 10/23/2019 0437    GLU 97 10/23/2019 0437        Component Value Date/Time    CALCIUM 9.1 10/23/2019 0437    ALKPHOS 80 10/18/2019 1545    AST 24 10/18/2019 1545    ALT 15 10/18/2019 1545    BILITOT 0.6 10/18/2019 1545    ESTGFRAFRICA >60 10/23/2019 0437    EGFRNONAA >60 10/23/2019 0437          Lab Results   Component Value Date    TSH 2.401 03/17/2016     Lab Results   Component Value Date    INR 2.3 01/19/2022    INR 2.4 01/05/2022    INR 3.5 12/22/2021     Lab Results   Component Value Date    WBC 10.49 10/23/2019    HGB 14.3 10/23/2019    HCT 42.4 10/23/2019    MCV 95 10/23/2019     10/23/2019     BMP  Sodium   Date Value Ref Range Status   10/23/2019 137 136 - 145 mmol/L Final     Potassium   Date Value Ref Range Status   10/23/2019 4.5 3.5 - 5.1 mmol/L Final     Chloride   Date Value Ref Range Status   10/23/2019 102 95 - 110 mmol/L Final     CO2   Date Value Ref Range Status   10/23/2019 26 23 - 29 mmol/L Final     BUN   Date Value Ref Range Status   10/23/2019 9 8 - 23 mg/dL Final     Creatinine   Date Value Ref Range Status   10/23/2019 0.7 0.5 - 1.4 mg/dL Final     Calcium   Date Value Ref Range Status "   10/23/2019 9.1 8.7 - 10.5 mg/dL Final     Anion Gap   Date Value Ref Range Status   10/23/2019 9 8 - 16 mmol/L Final     eGFR if    Date Value Ref Range Status   10/23/2019 >60 >60 mL/min/1.73 m^2 Final     eGFR if non    Date Value Ref Range Status   10/23/2019 >60 >60 mL/min/1.73 m^2 Final     Comment:     Calculation used to obtain the estimated glomerular filtration  rate (eGFR) is the CKD-EPI equation.        CrCl cannot be calculated (Patient's most recent lab result is older than the maximum 7 days allowed.).    Assessment:     1. Acute on chronic systolic congestive heart failure, NYHA class 2    2. ETOH abuse    3. Abnormal echocardiogram    4. Abnormal nuclear cardiac imaging test    5. Acute on chronic systolic congestive heart failure    6. Typical atrial flutter    7. Atrial fibrillation with RVR    8. Coronary artery disease involving native coronary artery of native heart without angina pectoris    9. Hyperlipidemia, unspecified hyperlipidemia type    10. Primary hypertension    11. Paroxysmal A-fib    12. Old MI (myocardial infarction)    13. S/P coronary artery stent placement    14. S/P CABG (coronary artery bypass graft)    15. S/P PTCA (percutaneous transluminal coronary angioplasty)    16. Shock liver    17. Near syncope    18. On Coumadin for atrial fibrillation    19. Overweight with body mass index (BMI) of 29 to 29.9 in adult    20. Obstructive sleep apnea syndrome    CAD S/P CABG S/P STENT LCX OCCLUDED RCA PATENT SVG TOP DIAGONAL ASYMPTOMATIC TOLERATING MEDS WELL NO CHANGE INE XERCISE CAPACITY WILL ADD LOW DOSE LOSARTAN .  CHF FCii STABLE WILL TRY ADDING LOW DSOE LOSARTAN   HLP ON STATINS LDL ON TARGET ASYMPTOMATIC CONTINUE THE SAME   AFIB S/P ABLATION ON ORAL ANTICOAGULATION NO BLEEDING NO CNS EVENT.   DAVID NOT USING CPAP HE COULD NOT TOLERATE.   Plan:   LOSARTAN 12.5 MG PO MIDDLE OF DAY   LOW SALT DIET   F/U IN 1 MONTH SEE HOW HE RESPONDS TO MEDS.    Continue current therapy.  Risk factor modification and excercise program.  .

## 2022-01-20 NOTE — TELEPHONE ENCOUNTER
Spoke to patient's wife and confirmed that temisartan in the generic for micardis.----- Message from July Darby sent at 1/20/2022  1:33 PM CST -----  Contact: Pts wife Mrs. Hi [employer] [phone]  Ms. Hi is calling in regards to her wanting to know if the pelmisartan is the right medication for her  to take?     Patient came in on today and you have prescribed this script for him.

## 2022-01-26 ENCOUNTER — ANTI-COAG VISIT (OUTPATIENT)
Dept: CARDIOLOGY | Facility: CLINIC | Age: 77
End: 2022-01-26
Payer: MEDICARE

## 2022-01-26 DIAGNOSIS — I48.91 ATRIAL FIBRILLATION WITH RVR: ICD-10-CM

## 2022-01-26 DIAGNOSIS — I48.0 PAROXYSMAL A-FIB: ICD-10-CM

## 2022-01-26 DIAGNOSIS — Z79.01 LONG TERM (CURRENT) USE OF ANTICOAGULANTS: ICD-10-CM

## 2022-01-26 LAB — INR PPP: 2.8

## 2022-01-26 PROCEDURE — 93793 PR ANTICOAGULANT MGMT FOR PT TAKING WARFARIN: ICD-10-PCS | Mod: S$GLB,,,

## 2022-01-26 PROCEDURE — 93793 ANTICOAG MGMT PT WARFARIN: CPT | Mod: S$GLB,,,

## 2022-01-26 NOTE — PROGRESS NOTES
Fax result received from [facility].  Early recheck.  INR: 2.8.  Patient contacted - spoke with Mrs. Pitts.  Previous warfarin instructions were verified and followed.  Instructions given to have patient to continue 2.5 mg daily.  Recheck in 2 weeks, 2/09/2022.  Mrs. Pitts confirms understanding.

## 2022-02-09 ENCOUNTER — ANTI-COAG VISIT (OUTPATIENT)
Dept: CARDIOLOGY | Facility: CLINIC | Age: 77
End: 2022-02-09
Payer: MEDICARE

## 2022-02-09 DIAGNOSIS — I48.0 PAROXYSMAL A-FIB: ICD-10-CM

## 2022-02-09 DIAGNOSIS — Z79.01 LONG TERM (CURRENT) USE OF ANTICOAGULANTS: ICD-10-CM

## 2022-02-09 LAB — INR PPP: 2.8

## 2022-02-09 PROCEDURE — 93793 ANTICOAG MGMT PT WARFARIN: CPT | Mod: S$GLB,,,

## 2022-02-09 PROCEDURE — 93793 PR ANTICOAGULANT MGMT FOR PT TAKING WARFARIN: ICD-10-PCS | Mod: S$GLB,,,

## 2022-02-09 NOTE — PROGRESS NOTES
Fax result received from [facility].  INR remains therapeutic at 2.8.  Test date: 2/09/2022.  No change in warfarin dose schedule - 2.5 mg daily.  INR is to be rechecked in 2 weeks.

## 2022-02-15 ENCOUNTER — OFFICE VISIT (OUTPATIENT)
Dept: CARDIOLOGY | Facility: CLINIC | Age: 77
End: 2022-02-15
Payer: MEDICARE

## 2022-02-15 VITALS
HEIGHT: 69 IN | DIASTOLIC BLOOD PRESSURE: 80 MMHG | WEIGHT: 197.31 LBS | BODY MASS INDEX: 29.22 KG/M2 | HEART RATE: 84 BPM | SYSTOLIC BLOOD PRESSURE: 130 MMHG | OXYGEN SATURATION: 96 %

## 2022-02-15 DIAGNOSIS — R93.1 ABNORMAL ECHOCARDIOGRAM: ICD-10-CM

## 2022-02-15 DIAGNOSIS — Z79.01 ON COUMADIN FOR ATRIAL FIBRILLATION: ICD-10-CM

## 2022-02-15 DIAGNOSIS — I50.23 ACUTE ON CHRONIC SYSTOLIC CONGESTIVE HEART FAILURE, NYHA CLASS 2: ICD-10-CM

## 2022-02-15 DIAGNOSIS — I50.23 ACUTE ON CHRONIC SYSTOLIC CONGESTIVE HEART FAILURE: Primary | ICD-10-CM

## 2022-02-15 DIAGNOSIS — E78.5 HYPERLIPIDEMIA, UNSPECIFIED HYPERLIPIDEMIA TYPE: Chronic | ICD-10-CM

## 2022-02-15 DIAGNOSIS — Z98.61 S/P PTCA (PERCUTANEOUS TRANSLUMINAL CORONARY ANGIOPLASTY): ICD-10-CM

## 2022-02-15 DIAGNOSIS — Z95.5 S/P CORONARY ARTERY STENT PLACEMENT: ICD-10-CM

## 2022-02-15 DIAGNOSIS — E66.3 OVERWEIGHT WITH BODY MASS INDEX (BMI) OF 29 TO 29.9 IN ADULT: ICD-10-CM

## 2022-02-15 DIAGNOSIS — I25.10 CORONARY ARTERY DISEASE INVOLVING NATIVE CORONARY ARTERY OF NATIVE HEART WITHOUT ANGINA PECTORIS: Chronic | ICD-10-CM

## 2022-02-15 DIAGNOSIS — I95.9 HYPOTENSION, UNSPECIFIED HYPOTENSION TYPE: ICD-10-CM

## 2022-02-15 DIAGNOSIS — I48.91 ATRIAL FIBRILLATION WITH RVR: ICD-10-CM

## 2022-02-15 DIAGNOSIS — Z95.1 S/P CABG (CORONARY ARTERY BYPASS GRAFT): ICD-10-CM

## 2022-02-15 DIAGNOSIS — F10.10 ETOH ABUSE: Chronic | ICD-10-CM

## 2022-02-15 DIAGNOSIS — R93.1 ABNORMAL NUCLEAR CARDIAC IMAGING TEST: ICD-10-CM

## 2022-02-15 DIAGNOSIS — I48.91 ON COUMADIN FOR ATRIAL FIBRILLATION: ICD-10-CM

## 2022-02-15 DIAGNOSIS — G47.33 OBSTRUCTIVE SLEEP APNEA SYNDROME: Chronic | ICD-10-CM

## 2022-02-15 DIAGNOSIS — I48.0 PAROXYSMAL A-FIB: ICD-10-CM

## 2022-02-15 DIAGNOSIS — I10 PRIMARY HYPERTENSION: ICD-10-CM

## 2022-02-15 DIAGNOSIS — I25.2 OLD MI (MYOCARDIAL INFARCTION): ICD-10-CM

## 2022-02-15 PROCEDURE — 3075F PR MOST RECENT SYSTOLIC BLOOD PRESS GE 130-139MM HG: ICD-10-PCS | Mod: CPTII,S$GLB,, | Performed by: INTERNAL MEDICINE

## 2022-02-15 PROCEDURE — 99213 OFFICE O/P EST LOW 20 MIN: CPT | Mod: S$GLB,,, | Performed by: INTERNAL MEDICINE

## 2022-02-15 PROCEDURE — 4010F PR ACE/ARB THEARPY RXD/TAKEN: ICD-10-PCS | Mod: CPTII,S$GLB,, | Performed by: INTERNAL MEDICINE

## 2022-02-15 PROCEDURE — 1126F PR PAIN SEVERITY QUANTIFIED, NO PAIN PRESENT: ICD-10-PCS | Mod: CPTII,S$GLB,, | Performed by: INTERNAL MEDICINE

## 2022-02-15 PROCEDURE — 3079F PR MOST RECENT DIASTOLIC BLOOD PRESSURE 80-89 MM HG: ICD-10-PCS | Mod: CPTII,S$GLB,, | Performed by: INTERNAL MEDICINE

## 2022-02-15 PROCEDURE — 1101F PR PT FALLS ASSESS DOC 0-1 FALLS W/OUT INJ PAST YR: ICD-10-PCS | Mod: CPTII,S$GLB,, | Performed by: INTERNAL MEDICINE

## 2022-02-15 PROCEDURE — 99999 PR PBB SHADOW E&M-EST. PATIENT-LVL IV: CPT | Mod: PBBFAC,,, | Performed by: INTERNAL MEDICINE

## 2022-02-15 PROCEDURE — 1101F PT FALLS ASSESS-DOCD LE1/YR: CPT | Mod: CPTII,S$GLB,, | Performed by: INTERNAL MEDICINE

## 2022-02-15 PROCEDURE — 1159F MED LIST DOCD IN RCRD: CPT | Mod: CPTII,S$GLB,, | Performed by: INTERNAL MEDICINE

## 2022-02-15 PROCEDURE — 4010F ACE/ARB THERAPY RXD/TAKEN: CPT | Mod: CPTII,S$GLB,, | Performed by: INTERNAL MEDICINE

## 2022-02-15 PROCEDURE — 3288F FALL RISK ASSESSMENT DOCD: CPT | Mod: CPTII,S$GLB,, | Performed by: INTERNAL MEDICINE

## 2022-02-15 PROCEDURE — 1126F AMNT PAIN NOTED NONE PRSNT: CPT | Mod: CPTII,S$GLB,, | Performed by: INTERNAL MEDICINE

## 2022-02-15 PROCEDURE — 99999 PR PBB SHADOW E&M-EST. PATIENT-LVL IV: ICD-10-PCS | Mod: PBBFAC,,, | Performed by: INTERNAL MEDICINE

## 2022-02-15 PROCEDURE — 3288F PR FALLS RISK ASSESSMENT DOCUMENTED: ICD-10-PCS | Mod: CPTII,S$GLB,, | Performed by: INTERNAL MEDICINE

## 2022-02-15 PROCEDURE — 3008F PR BODY MASS INDEX (BMI) DOCUMENTED: ICD-10-PCS | Mod: CPTII,S$GLB,, | Performed by: INTERNAL MEDICINE

## 2022-02-15 PROCEDURE — 3075F SYST BP GE 130 - 139MM HG: CPT | Mod: CPTII,S$GLB,, | Performed by: INTERNAL MEDICINE

## 2022-02-15 PROCEDURE — 3079F DIAST BP 80-89 MM HG: CPT | Mod: CPTII,S$GLB,, | Performed by: INTERNAL MEDICINE

## 2022-02-15 PROCEDURE — 1159F PR MEDICATION LIST DOCUMENTED IN MEDICAL RECORD: ICD-10-PCS | Mod: CPTII,S$GLB,, | Performed by: INTERNAL MEDICINE

## 2022-02-15 PROCEDURE — 99213 PR OFFICE/OUTPT VISIT, EST, LEVL III, 20-29 MIN: ICD-10-PCS | Mod: S$GLB,,, | Performed by: INTERNAL MEDICINE

## 2022-02-15 PROCEDURE — 3008F BODY MASS INDEX DOCD: CPT | Mod: CPTII,S$GLB,, | Performed by: INTERNAL MEDICINE

## 2022-02-15 RX ORDER — LOSARTAN POTASSIUM 25 MG/1
25 TABLET ORAL DAILY
Qty: 90 TABLET | Refills: 3 | Status: SHIPPED | OUTPATIENT
Start: 2022-02-15 | End: 2022-02-16 | Stop reason: SDUPTHER

## 2022-02-15 NOTE — PROGRESS NOTES
Subjective:   Patient ID:  Jethro Pitts is a 76 y.o. male who presents for follow up of No chief complaint on file.      HPI  10/4/2021 JOSÉ MIGUEL STEINBERG   Mr. Pitts is a 75 year old male patient whose current medical conditions include atrial fibrillation, atrial flutter (previoulsy on Eliquis, now on Coumadin s/p successful PVI and AFL ablation 5/24/16 by Dr. Villafuerte) systolic CHF, cardiomyopathy, CAD s/p CABG s/p PCI of LCX in 10/19 old MI, HTN, and hyperlipidemia who presents today for routine follow-up. He returns today and states he is doing ok. Does endorse fatigue with activity. Feels his mobility is limited due to knee pain. No overt chest pain, heaviness, or tightness. No SOB/FOY. No LE edema, PND, orthopnea, or excessive weight gain. BP controlled today in office. Patient is compliant with his medications, on Plavix, and Coumadin. No bleeding issues. No s/s of TIA/CVA.     Echo 9/27/21 showed EF of 35%, down from prior of 40-45%, will discuss further with Dr. Raza about possible MPI stress test.     Labs scheduled for when he and his wife return from their trip to [place].      1/20/2022  HERE FOR F/U FUNCTIONAL CAPACITY UNCHANGED NO ORTHOPNEA PND NO LEG SWELLING NO PALPITATION NOT USING CPAP HAS LOST WEIGHT COMPLIANT WITH DIET AND MEDS. SHORT OF BREATH WHEN LIFTS ANYTHING HEAVY. HE CAN WALK W/O ANY SIGNIFICANT LIMITATION EXCEPT HIS KNEES ARE PAINFUL.     2/15/2022  Here for f/u to assess response to losartan he has tolerated well no dizziness no significant drop in bp . He has no palpitation syncope near syncope no change in status. Has no leg edema no dizziness.he is compliant with diet.   Past Medical History:   Diagnosis Date    Acute coronary syndrome     Anticoagulant long-term use     Atrial fibrillation     Atrial flutter     CHF (congestive heart failure)     Coronary artery disease     Depression     Encounter for blood transfusion     Hyperlipidemia     Hypertension     Old MI  (myocardial infarction)     S/P CABG (coronary artery bypass graft) 2007    S/P PTCA (percutaneous transluminal coronary angioplasty) 2007    Sleep apnea        Past Surgical History:   Procedure Laterality Date    CATARACT EXTRACTION Bilateral     CORONARY ANGIOPLASTY      CORONARY ARTERY BYPASS GRAFT      EYE SURGERY      KNEE SURGERY      LEFT HEART CATHETERIZATION Left 10/22/2019    Procedure: CATHETERIZATION, HEART, LEFT;  Surgeon: Henna Raza MD;  Location: [facility] CATH LAB;  Service: Cardiology;  Laterality: Left;  830 start    RADIOFREQUENCY ABLATION  2016    AFL and AF        Social History     Tobacco Use    Smoking status: Former Smoker     Packs/day: 0.50     Years: 15.00     Pack years: 7.50     Quit date: 1983     Years since quittin.2    Smokeless tobacco: Former User   Substance Use Topics    Alcohol use: Yes     Alcohol/week: 49.0 standard drinks     Types: 7 Glasses of wine, 42 Cans of beer per week    Drug use: No       Family History   Problem Relation Age of Onset    Coronary artery disease Mother     Hypertension Mother     Coronary artery disease Sister     Hypertension Sister     Coronary artery disease Brother     Hypertension Brother        Current Outpatient Medications   Medication Sig    atorvastatin (LIPITOR) 20 MG tablet Take 2 tablets (40 mg total) by mouth every evening.    calcium-vitamin D tablet 600 mg-200 units Take 1 tablet by mouth.    carvediloL (COREG) 12.5 MG tablet Take 1 tablet (12.5 mg total) by mouth 2 (two) times daily with meals.    clopidogrel (PLAVIX) 75 mg tablet Take 1 tablet (75 mg total) by mouth once daily.    escitalopram oxalate (LEXAPRO) 10 MG tablet Take 10 mg by mouth once daily.     losartan (COZAAR) 25 MG tablet Take 0.5 tablets (12.5 mg total) by mouth once daily.    prothrombin time test strips Strp Test daily    prothrombin time/INR test metr Misc Test weekly    telmisartan (MICARDIS) 20 MG Tab  Take 20 mg by mouth once daily.    warfarin (COUMADIN) 2.5 MG tablet Take 1.5 tablets by mouth on Sundays; and 1 tablet on all other days. (Patient taking differently: Take 2.5 mg by mouth Daily.)    cholecalciferol, vitamin D3, 125 mcg (5,000 unit) Tab Take 185 mg by mouth.      No current facility-administered medications for this visit.     Current Outpatient Medications on File Prior to Visit   Medication Sig    atorvastatin (LIPITOR) 20 MG tablet Take 2 tablets (40 mg total) by mouth every evening.    calcium-vitamin D tablet 600 mg-200 units Take 1 tablet by mouth.    carvediloL (COREG) 12.5 MG tablet Take 1 tablet (12.5 mg total) by mouth 2 (two) times daily with meals.    clopidogrel (PLAVIX) 75 mg tablet Take 1 tablet (75 mg total) by mouth once daily.    escitalopram oxalate (LEXAPRO) 10 MG tablet Take 10 mg by mouth once daily.     losartan (COZAAR) 25 MG tablet Take 0.5 tablets (12.5 mg total) by mouth once daily.    prothrombin time test strips Strp Test daily    prothrombin time/INR test metr Misc Test weekly    telmisartan (MICARDIS) 20 MG Tab Take 20 mg by mouth once daily.    warfarin (COUMADIN) 2.5 MG tablet Take 1.5 tablets by mouth on Sundays; and 1 tablet on all other days. (Patient taking differently: Take 2.5 mg by mouth Daily.)    cholecalciferol, vitamin D3, 125 mcg (5,000 unit) Tab Take 185 mg by mouth.      No current facility-administered medications on file prior to visit.     Review of patient's allergies indicates:   Allergen Reactions    Pcn [penicillins] Rash       Review of Systems   Constitutional: Negative for malaise/fatigue.   Eyes: Negative for blurred vision.   Cardiovascular: Negative for chest pain, claudication, cyanosis, dyspnea on exertion, irregular heartbeat, leg swelling, near-syncope, orthopnea, palpitations and paroxysmal nocturnal dyspnea.   Respiratory: Negative for cough, hemoptysis and shortness of breath.    Hematologic/Lymphatic: Negative for  bleeding problem. Does not bruise/bleed easily.   Skin: Negative for dry skin and itching.   Musculoskeletal: Negative for falls, muscle weakness and myalgias.   Gastrointestinal: Negative for abdominal pain, diarrhea, heartburn, hematemesis, hematochezia and melena.   Genitourinary: Negative for flank pain and hematuria.   Neurological: Negative for dizziness, focal weakness, headaches, light-headedness, numbness, paresthesias, seizures and weakness.   Psychiatric/Behavioral: Negative for altered mental status and memory loss. The patient is not nervous/anxious.    Allergic/Immunologic: Negative for hives.       Objective:   Physical Exam  Vitals and nursing note reviewed.   Constitutional:       General: He is not in acute distress.     Appearance: He is well-developed and well-nourished. He is not diaphoretic.   HENT:      Head: Normocephalic and atraumatic.   Eyes:      General:         Right eye: No discharge.         Left eye: No discharge.      Extraocular Movements: EOM normal.      Pupils: Pupils are equal, round, and reactive to light.   Neck:      Thyroid: No thyromegaly.      Vascular: No carotid bruit or JVD.   Cardiovascular:      Rate and Rhythm: Normal rate and regular rhythm.      Pulses: Normal pulses and intact distal pulses.      Heart sounds: Normal heart sounds. No murmur heard.  No friction rub. No gallop.    Pulmonary:      Effort: Pulmonary effort is normal. No respiratory distress.      Breath sounds: Normal breath sounds. No wheezing or rales.      Comments: Scar cabg well healed.   Chest:      Chest wall: No tenderness.   Abdominal:      General: Bowel sounds are normal. There is no distension.      Palpations: Abdomen is soft.      Tenderness: There is no abdominal tenderness.   Musculoskeletal:         General: No edema. Normal range of motion.      Cervical back: Neck supple.      Right lower leg: No edema.      Left lower leg: No edema.   Skin:     General: Skin is warm and dry.       "Findings: No erythema or rash.   Neurological:      Mental Status: He is alert and oriented to person, place, and time.      Cranial Nerves: No cranial nerve deficit.   Psychiatric:         Mood and Affect: Mood and affect normal.         Behavior: Behavior normal.         Judgment: Judgment normal.       Vitals:    02/15/22 1356 02/15/22 1357   BP: 134/78 130/80   BP Location: Right arm Left arm   Patient Position: Sitting Sitting   BP Method: Medium (Automatic) Medium (Manual)   Pulse: 84    SpO2: 96%    Weight: 89.5 kg (197 lb 5 oz)    Height: 5' 9" (1.753 m)      Lab Results   Component Value Date    CHOL 151 04/24/2012    CHOL 151 07/25/2011    CHOL 152 06/09/2010     Lab Results   Component Value Date    HDL 58 04/24/2012    HDL 56 07/25/2011    HDL 48 06/09/2010     Lab Results   Component Value Date    LDLCALC 73.0 04/24/2012    LDLCALC 74.0 07/25/2011    LDLCALC 67.2 06/09/2010     Lab Results   Component Value Date    TRIG 102 04/24/2012    TRIG 105 07/25/2011    TRIG 184 (H) 06/09/2010     Lab Results   Component Value Date    CHOLHDL 38.4 04/24/2012    CHOLHDL 37.1 07/25/2011    CHOLHDL 31.6 06/09/2010       Chemistry        Component Value Date/Time     10/23/2019 0437    K 4.5 10/23/2019 0437     10/23/2019 0437    CO2 26 10/23/2019 0437    BUN 9 10/23/2019 0437    CREATININE 0.7 10/23/2019 0437    GLU 97 10/23/2019 0437        Component Value Date/Time    CALCIUM 9.1 10/23/2019 0437    ALKPHOS 80 10/18/2019 1545    AST 24 10/18/2019 1545    ALT 15 10/18/2019 1545    BILITOT 0.6 10/18/2019 1545    ESTGFRAFRICA >60 10/23/2019 0437    EGFRNONAA >60 10/23/2019 0437          Lab Results   Component Value Date    TSH 2.401 03/17/2016     Lab Results   Component Value Date    INR 2.8 02/09/2022    INR 2.8 01/26/2022    INR 2.3 01/19/2022     Lab Results   Component Value Date    WBC 10.49 10/23/2019    HGB 14.3 10/23/2019    HCT 42.4 10/23/2019    MCV 95 10/23/2019     10/23/2019 "     BMP  Sodium   Date Value Ref Range Status   10/23/2019 137 136 - 145 mmol/L Final     Potassium   Date Value Ref Range Status   10/23/2019 4.5 3.5 - 5.1 mmol/L Final     Chloride   Date Value Ref Range Status   10/23/2019 102 95 - 110 mmol/L Final     CO2   Date Value Ref Range Status   10/23/2019 26 23 - 29 mmol/L Final     BUN   Date Value Ref Range Status   10/23/2019 9 8 - 23 mg/dL Final     Creatinine   Date Value Ref Range Status   10/23/2019 0.7 0.5 - 1.4 mg/dL Final     Calcium   Date Value Ref Range Status   10/23/2019 9.1 8.7 - 10.5 mg/dL Final     Anion Gap   Date Value Ref Range Status   10/23/2019 9 8 - 16 mmol/L Final     eGFR if    Date Value Ref Range Status   10/23/2019 >60 >60 mL/min/1.73 m^2 Final     eGFR if non    Date Value Ref Range Status   10/23/2019 >60 >60 mL/min/1.73 m^2 Final     Comment:     Calculation used to obtain the estimated glomerular filtration  rate (eGFR) is the CKD-EPI equation.        CrCl cannot be calculated (Patient's most recent lab result is older than the maximum 7 days allowed.).    Assessment:     1. Acute on chronic systolic congestive heart failure    2. Abnormal echocardiogram    3. ETOH abuse    4. Abnormal nuclear cardiac imaging test    5. Acute on chronic systolic congestive heart failure, NYHA class 2    6. Atrial fibrillation with RVR    7. Coronary artery disease involving native coronary artery of native heart without angina pectoris    8. Hyperlipidemia, unspecified hyperlipidemia type    9. Primary hypertension    10. Hypotension, unspecified hypotension type    11. Old MI (myocardial infarction)    12. Paroxysmal A-fib    13. S/P CABG (coronary artery bypass graft)    14. S/P coronary artery stent placement    15. S/P PTCA (percutaneous transluminal coronary angioplasty)    16. On Coumadin for atrial fibrillation    17. Overweight with body mass index (BMI) of 29 to 29.9 in adult    18. Obstructive sleep apnea  syndrome      He will benefit from increasing losartan to 25 mg opo daily to optimize therapy.  Plan:   As per above   Will follow in 3 months   Low salt diet   contineu ACTIVE LIFESTYLE EXERCISE. WEIGHT LOSS

## 2022-02-16 DIAGNOSIS — I25.10 CORONARY ARTERY DISEASE INVOLVING NATIVE CORONARY ARTERY OF NATIVE HEART WITHOUT ANGINA PECTORIS: Chronic | ICD-10-CM

## 2022-02-16 RX ORDER — LOSARTAN POTASSIUM 25 MG/1
25 TABLET ORAL DAILY
Qty: 90 TABLET | Refills: 3 | Status: SHIPPED | OUTPATIENT
Start: 2022-02-16 | End: 2023-02-16

## 2022-02-23 ENCOUNTER — ANTI-COAG VISIT (OUTPATIENT)
Dept: CARDIOLOGY | Facility: CLINIC | Age: 77
End: 2022-02-23
Payer: MEDICARE

## 2022-02-23 DIAGNOSIS — Z79.01 LONG TERM (CURRENT) USE OF ANTICOAGULANTS: ICD-10-CM

## 2022-02-23 LAB — INR PPP: 2.6

## 2022-02-23 PROCEDURE — G0250 PR MD REVIEW INTERPRET OF TEST: ICD-10-PCS | Mod: S$GLB,,,

## 2022-02-23 PROCEDURE — G0250 MD INR TEST REVIE INTER MGMT: HCPCS | Mod: S$GLB,,,

## 2022-02-23 NOTE — PROGRESS NOTES
Fax result received from [employer].  INR remains therapeutic at 2.6.  Test date: 2/23/2022.  No change in warfarin dose schedule - 2.5 mg daily.  INR is to be rechecked in 2 weeks.

## 2022-02-24 ENCOUNTER — TELEPHONE (OUTPATIENT)
Dept: CARDIOLOGY | Facility: CLINIC | Age: 77
End: 2022-02-24

## 2022-02-24 NOTE — TELEPHONE ENCOUNTER
Lala (Today,  2:28 PM)  Calling in regards to renewing a script of [facility] INR. Please call [phone] option #4   Advise the company to fax over the paperwork in order to update the patient's prescription.

## 2022-03-09 ENCOUNTER — ANTI-COAG VISIT (OUTPATIENT)
Dept: CARDIOLOGY | Facility: CLINIC | Age: 77
End: 2022-03-09
Payer: MEDICARE

## 2022-03-09 DIAGNOSIS — Z79.01 LONG TERM (CURRENT) USE OF ANTICOAGULANTS: ICD-10-CM

## 2022-03-09 LAB — INR PPP: 2.2

## 2022-03-09 PROCEDURE — 99499 NO LOS: ICD-10-PCS | Mod: S$GLB,,,

## 2022-03-09 PROCEDURE — 99499 UNLISTED E&M SERVICE: CPT | Mod: S$GLB,,,

## 2022-03-09 NOTE — PROGRESS NOTES
Fax received [employer] - 2.2 - slightly subtherapeutic.  Patient contacted, reports no changes in medications, diet or health.  No missed doses, so we will boost and resume.  Repeat in 2 weeks.  Pt confirms understanding.

## 2022-03-23 ENCOUNTER — ANTI-COAG VISIT (OUTPATIENT)
Dept: CARDIOLOGY | Facility: CLINIC | Age: 77
End: 2022-03-23
Payer: MEDICARE

## 2022-03-23 DIAGNOSIS — Z79.01 LONG TERM (CURRENT) USE OF ANTICOAGULANTS: ICD-10-CM

## 2022-03-23 LAB — INR PPP: 2.6

## 2022-03-23 PROCEDURE — 99499 UNLISTED E&M SERVICE: CPT | Mod: S$GLB,,,

## 2022-03-23 PROCEDURE — 99499 NO LOS: ICD-10-PCS | Mod: S$GLB,,,

## 2022-03-23 NOTE — PROGRESS NOTES
Fax result received from [facility].  INR remains therapeutic at 2.6.  Test date: 3/23/2022.  No change in warfarin dose schedule - 2.5 mg daily.  INR is to be rechecked in 2 weeks.

## 2022-04-07 ENCOUNTER — ANTI-COAG VISIT (OUTPATIENT)
Dept: CARDIOLOGY | Facility: CLINIC | Age: 77
End: 2022-04-07
Payer: MEDICARE

## 2022-04-07 DIAGNOSIS — Z79.01 LONG TERM (CURRENT) USE OF ANTICOAGULANTS: ICD-10-CM

## 2022-04-07 DIAGNOSIS — I48.91 ATRIAL FIBRILLATION WITH RVR: ICD-10-CM

## 2022-04-07 LAB — INR PPP: 3.2

## 2022-04-07 PROCEDURE — G0250 MD INR TEST REVIE INTER MGMT: HCPCS | Mod: S$GLB,,,

## 2022-04-07 PROCEDURE — G0250 PR MD REVIEW INTERPRET OF TEST: ICD-10-PCS | Mod: S$GLB,,,

## 2022-04-07 NOTE — PROGRESS NOTES
Fax received from [employer].  INR supra-therapeutic at 3.2.  Patient contacted.  Reports followed previous instructions.  Reports held dose on 4/6/22 due to INR on 4/6/22 was supra-therapeutic.  Re-educated patient concerning risks of self-dosing.   Advised of signs/symptoms of bleeding and need to go to ED if experiences any.  Reports no signs/symptoms of bleeding.   Instructions given: continue warfarin 2.5 mg every day.  Recheck in two weeks.  Patient repeated back correctly and verbalizes understanding.

## 2022-04-20 ENCOUNTER — ANTI-COAG VISIT (OUTPATIENT)
Dept: CARDIOLOGY | Facility: CLINIC | Age: 77
End: 2022-04-20
Payer: MEDICARE

## 2022-04-20 DIAGNOSIS — Z79.01 LONG TERM (CURRENT) USE OF ANTICOAGULANTS: ICD-10-CM

## 2022-04-20 LAB — INR PPP: 3.2

## 2022-04-20 PROCEDURE — 99499 UNLISTED E&M SERVICE: CPT | Mod: S$GLB,,,

## 2022-04-20 PROCEDURE — 99499 NO LOS: ICD-10-PCS | Mod: S$GLB,,,

## 2022-04-20 NOTE — PROGRESS NOTES
Fax received from [employer].  INR supra-therapeutic at 3.2.  Test date 4/20/22.  Patient contacted.  Reports followed previous instructions.  We will lower dose this week to 1.25 mg on Wednesday then 2.5 mg AOD.  He will retest in 1 week versus 2 weeks to verify change, may need to lower overall dose.  Patient confirms understanding.

## 2022-04-27 ENCOUNTER — ANTI-COAG VISIT (OUTPATIENT)
Dept: CARDIOLOGY | Facility: CLINIC | Age: 77
End: 2022-04-27
Payer: MEDICARE

## 2022-04-27 DIAGNOSIS — Z79.01 LONG TERM (CURRENT) USE OF ANTICOAGULANTS: ICD-10-CM

## 2022-04-27 LAB — INR PPP: 3.1

## 2022-04-27 PROCEDURE — 99499 NO LOS: ICD-10-PCS | Mod: S$GLB,,,

## 2022-04-27 PROCEDURE — 99499 UNLISTED E&M SERVICE: CPT | Mod: S$GLB,,,

## 2022-04-27 NOTE — PROGRESS NOTES
Fax received from [employer] [facility] - INR is slightly above goal - 3.1, but trending down.  We will lower overall dose to 1.25 mg on Wednesdays and 2.5 mg on all other days.  Patient contacted, reviewed dose.  He will retest in 2 weeks to verify dose.

## 2022-05-11 ENCOUNTER — ANTI-COAG VISIT (OUTPATIENT)
Dept: CARDIOLOGY | Facility: CLINIC | Age: 77
End: 2022-05-11
Payer: MEDICARE

## 2022-05-11 DIAGNOSIS — Z79.01 LONG TERM (CURRENT) USE OF ANTICOAGULANTS: ICD-10-CM

## 2022-05-11 LAB — INR PPP: 2.3

## 2022-05-11 PROCEDURE — 93793 PR ANTICOAGULANT MGMT FOR PT TAKING WARFARIN: ICD-10-PCS | Mod: S$GLB,,,

## 2022-05-11 PROCEDURE — 93793 ANTICOAG MGMT PT WARFARIN: CPT | Mod: S$GLB,,,

## 2022-05-11 NOTE — PROGRESS NOTES
Fax received from [email or web address] - test date 5/11/22 - INR is slightly subtherapeutic today.  He was elevated 2 weeks ago above goal and dose change was made- lowered naik.  We will re-challenge dose without any change since he is close to goal with tight range.  Patient did not miss dose or eat greens.  We will repeat in 2 weeks, may need to consider a return to previous dose if low once again.

## 2022-05-25 ENCOUNTER — ANTI-COAG VISIT (OUTPATIENT)
Dept: CARDIOLOGY | Facility: CLINIC | Age: 77
End: 2022-05-25
Payer: MEDICARE

## 2022-05-25 DIAGNOSIS — Z79.01 LONG TERM (CURRENT) USE OF ANTICOAGULANTS: ICD-10-CM

## 2022-05-25 LAB — INR PPP: 2.3

## 2022-05-25 PROCEDURE — 93793 ANTICOAG MGMT PT WARFARIN: CPT | Mod: S$GLB,,,

## 2022-05-25 PROCEDURE — 93793 PR ANTICOAGULANT MGMT FOR PT TAKING WARFARIN: ICD-10-PCS | Mod: S$GLB,,,

## 2022-05-25 NOTE — PROGRESS NOTES
Fax received from [employer] -INR remains subtherapeutic.  We will boost dose x 1 today then resume.  May need to return to previous dosing.  Cannot seem to hit goal range with slight changes to dose.  Patient denies missed doses or changes in diet.

## 2022-06-03 ENCOUNTER — TELEPHONE (OUTPATIENT)
Dept: CARDIAC REHAB | Facility: HOSPITAL | Age: 77
End: 2022-06-03

## 2022-06-03 NOTE — TELEPHONE ENCOUNTER
He had dental work and dentist prescribed flexeril  - muscle relaxer - wife wanted to know if OK to give - informed wife that it was OK to give if prescribed by dentist but would let cardio MD know and advise as well  Also informed wife of S&S to look for if med did not agree with pt and instructed to stop med    Please advise          ----- Message from Enrico Gamez sent at 6/3/2022 11:46 AM CDT -----  Pt's wife is needing a call back regarding some side effects of a medication and if it is okay for him to take the medication. Please call back at [phone]

## 2022-06-08 ENCOUNTER — ANTI-COAG VISIT (OUTPATIENT)
Dept: CARDIOLOGY | Facility: CLINIC | Age: 77
End: 2022-06-08
Payer: MEDICARE

## 2022-06-08 DIAGNOSIS — I48.91 ATRIAL FIBRILLATION WITH RVR: ICD-10-CM

## 2022-06-08 DIAGNOSIS — Z79.01 LONG TERM (CURRENT) USE OF ANTICOAGULANTS: ICD-10-CM

## 2022-06-08 DIAGNOSIS — I48.0 PAROXYSMAL A-FIB: ICD-10-CM

## 2022-06-08 LAB — INR PPP: 2

## 2022-06-08 PROCEDURE — 93793 PR ANTICOAGULANT MGMT FOR PT TAKING WARFARIN: ICD-10-PCS | Mod: S$GLB,,,

## 2022-06-08 PROCEDURE — 93793 ANTICOAG MGMT PT WARFARIN: CPT | Mod: S$GLB,,,

## 2022-06-08 NOTE — PROGRESS NOTES
Fax result received from [employer].  INR: 2.0 - subtherapeutic.  Patient contacted:  INR is dropping below goal.  Patient denies missed doses or changes in diet.  No s/s reported. Current warfarin dose verified.  Instructions given:  Will increase overall dose to 2.5 mg daily.  Recheck INR in 2 weeks.  Patient confirms understanding.

## 2022-06-22 ENCOUNTER — ANTI-COAG VISIT (OUTPATIENT)
Dept: CARDIOLOGY | Facility: CLINIC | Age: 77
End: 2022-06-22
Payer: MEDICARE

## 2022-06-22 DIAGNOSIS — I48.0 PAROXYSMAL A-FIB: ICD-10-CM

## 2022-06-22 DIAGNOSIS — Z79.01 LONG TERM (CURRENT) USE OF ANTICOAGULANTS: ICD-10-CM

## 2022-06-22 DIAGNOSIS — I48.91 ATRIAL FIBRILLATION WITH RVR: ICD-10-CM

## 2022-06-22 LAB — INR PPP: 3

## 2022-06-22 PROCEDURE — 93793 ANTICOAG MGMT PT WARFARIN: CPT | Mod: S$GLB,,,

## 2022-06-22 PROCEDURE — 93793 PR ANTICOAGULANT MGMT FOR PT TAKING WARFARIN: ICD-10-PCS | Mod: S$GLB,,,

## 2022-06-22 NOTE — PROGRESS NOTES
Fax result received from [employer].  INR: 3.0.  Patient contacted:  INR is back at goal.  Patient reports no recent changes.  Instructions given to continue 2.5 mg daily.  Retest INR in 2 weeks.  Patient confirms understanding.

## 2022-07-05 ENCOUNTER — TELEPHONE (OUTPATIENT)
Dept: CARDIOLOGY | Facility: CLINIC | Age: 77
End: 2022-07-05
Payer: COMMERCIAL

## 2022-07-05 DIAGNOSIS — Z79.01 ON COUMADIN FOR ATRIAL FIBRILLATION: ICD-10-CM

## 2022-07-05 DIAGNOSIS — I25.2 OLD MI (MYOCARDIAL INFARCTION): ICD-10-CM

## 2022-07-05 DIAGNOSIS — I25.10 CORONARY ARTERY DISEASE INVOLVING NATIVE CORONARY ARTERY OF NATIVE HEART WITHOUT ANGINA PECTORIS: Primary | ICD-10-CM

## 2022-07-05 DIAGNOSIS — I48.91 ON COUMADIN FOR ATRIAL FIBRILLATION: ICD-10-CM

## 2022-07-05 DIAGNOSIS — R93.1 ABNORMAL ECHOCARDIOGRAM: ICD-10-CM

## 2022-07-05 NOTE — TELEPHONE ENCOUNTER
Faxed referral to Dr. Carney----- Message from Henna Raza MD sent at 7/5/2022  4:29 PM CDT -----  Contact: Sussy clayton by me.no order needed in chart I guess.  ----- Message -----  From: Sheba Neves LPN  Sent: 7/5/2022   4:11 PM CDT  To: Henna Raza MD    I guess just a regular referral and I will print it and fax it  ----- Message -----  From: Henna Raza MD  Sent: 7/5/2022   4:00 PM CDT  To: Sheba Neves LPN    WHAT DO I DO PUT ORDER IN CHART.  ----- Message -----  From: Sheba Neves LPN  Sent: 7/5/2022   3:58 PM CDT  To: Henna Raza MD    Cardiologist in [place]. They moved and want to see him now but need a referral  ----- Message -----  From: Henna Raza MD  Sent: 7/5/2022   3:54 PM CDT  To: Sheba Neves LPN    WHICH SPECIALTY IS DR CARNEY.AND WHAT AM I REFERRING HIM FOR ? COMPLAINTS OR REASON.  ----- Message -----  From: Sheba Neves LPN  Sent: 7/5/2022   3:18 PM CDT  To: Henna Raza MD    Patient's wife would like to a referral to Dr. Carney because they moved and cant' get an appt without a referral and there PCP has not called them back yet to schedule them  ----- Message -----  From: Dorie Cruz  Sent: 7/5/2022   3:00 PM CDT  To: Ry Hi called and stated that if Dr. Raza can send a referral over to Dr. Carney to the fax number that was sent with the earlier message, so an appointment can be made then they will request the records as he will have to sign a release. Please call her back at [phone]

## 2022-07-12 ENCOUNTER — TELEPHONE (OUTPATIENT)
Dept: CARDIOLOGY | Facility: CLINIC | Age: 77
End: 2022-07-12
Payer: COMMERCIAL

## 2022-07-12 ENCOUNTER — ANTI-COAG VISIT (OUTPATIENT)
Dept: CARDIOLOGY | Facility: CLINIC | Age: 77
End: 2022-07-12
Payer: COMMERCIAL

## 2022-07-12 NOTE — PROGRESS NOTES
Patient has moved to [place] and will be establishing care elsewhere, resolving coumadin clinic episode.

## 2022-07-12 NOTE — TELEPHONE ENCOUNTER
Called patient in reference to home testing INR that's past due.  Patient's wife reports they have moved to [facility] and will be seeing a cardiologist there and he will follow patient's warfarin.

## 2022-08-04 ENCOUNTER — TELEPHONE (OUTPATIENT)
Dept: CARDIOLOGY | Facility: CLINIC | Age: 77
End: 2022-08-04
Payer: COMMERCIAL

## 2022-08-04 NOTE — TELEPHONE ENCOUNTER
LVM for patient and wife to update INR monitoring with [employer] to their new MD.  He is establishing care with new MD, explained that the new MD would have to contact [employer] and alert them to this change.

## 2023-03-26 NOTE — PROGRESS NOTES
INR is sub-therapeutic. Patient reports missed doses. Will boost this week's dose then resume dose and diet until follow-up. Patient reports no bleeding or bruising, no new medications and no diet changes.  I reminded the patient to call with any problems, changes or questions before the next visit.     Writer observed pt sitting in bed comfortably on phone.

## 2024-01-12 ENCOUNTER — TELEPHONE (OUTPATIENT)
Dept: CARDIOLOGY | Facility: CLINIC | Age: 79
End: 2024-01-12
Payer: COMMERCIAL

## 2024-01-12 NOTE — TELEPHONE ENCOUNTER
Spoke to patient's wife and she is getting records form [facility] medical records.----- Message from Airam Gavin sent at 1/12/2024  8:54 AM CST -----  Regarding: return call  Type:  Patient Returning Call    Who Called: RORO CAPPS [[phone]]  Sandra ( wife)    Who Left Message for Patient: Sheba    Does the patient know what this is regarding? Pt records    Best Call Back Number: [phone]

## 2024-01-12 NOTE — TELEPHONE ENCOUNTER
LVM for patients wife to call regarding which records she needs faxed----- Message from Isidro Brown sent at 1/11/2024 10:19 AM CST -----  Contact: Sussy/wife  Pt wife is calling in regards to getting pt medical records sent Dr Ethan Pacheco [phone] fax number [phone]. Please call back at [phone]                          Thanks  KT

## 2024-04-23 NOTE — TELEPHONE ENCOUNTER
Appt has been scheduled with patient's wife.   Bed/Stretcher in lowest position, wheels locked, appropriate side rails in place/Call bell, personal items and telephone in reach/Instruct patient to call for assistance before getting out of bed/chair/stretcher/Non-slip footwear applied when patient is off stretcher/[place] to call system/Physically safe environment - no spills, clutter or unnecessary equipment/Purposeful proactive rounding/Room/bathroom lighting operational, light cord in reach